# Patient Record
Sex: FEMALE | Race: WHITE | NOT HISPANIC OR LATINO | Employment: UNEMPLOYED | URBAN - METROPOLITAN AREA
[De-identification: names, ages, dates, MRNs, and addresses within clinical notes are randomized per-mention and may not be internally consistent; named-entity substitution may affect disease eponyms.]

---

## 2018-12-28 ENCOUNTER — APPOINTMENT (EMERGENCY)
Dept: RADIOLOGY | Facility: HOSPITAL | Age: 38
End: 2018-12-28
Payer: COMMERCIAL

## 2018-12-28 ENCOUNTER — HOSPITAL ENCOUNTER (EMERGENCY)
Facility: HOSPITAL | Age: 38
Discharge: HOME | End: 2018-12-28
Attending: EMERGENCY MEDICINE
Payer: COMMERCIAL

## 2018-12-28 VITALS
TEMPERATURE: 99.4 F | SYSTOLIC BLOOD PRESSURE: 98 MMHG | DIASTOLIC BLOOD PRESSURE: 55 MMHG | WEIGHT: 144 LBS | HEART RATE: 64 BPM | BODY MASS INDEX: 25.51 KG/M2 | OXYGEN SATURATION: 97 % | RESPIRATION RATE: 18 BRPM

## 2018-12-28 DIAGNOSIS — R50.9 FEVER: ICD-10-CM

## 2018-12-28 DIAGNOSIS — R51.9 HEADACHE: Primary | ICD-10-CM

## 2018-12-28 LAB
ALBUMIN SERPL BCP-MCNC: 2.9 G/DL (ref 3.5–5)
ALP SERPL-CCNC: 99 U/L (ref 46–116)
ALT SERPL W P-5'-P-CCNC: 27 U/L (ref 12–78)
ANION GAP SERPL CALCULATED.3IONS-SCNC: 7 MMOL/L (ref 4–13)
AST SERPL W P-5'-P-CCNC: 18 U/L (ref 5–45)
BACTERIA UR QL AUTO: ABNORMAL /HPF
BASOPHILS # BLD AUTO: 0.03 THOUSANDS/ΜL (ref 0–0.1)
BASOPHILS NFR BLD AUTO: 0 % (ref 0–1)
BILIRUB DIRECT SERPL-MCNC: 0.1 MG/DL (ref 0–0.2)
BILIRUB SERPL-MCNC: 0.2 MG/DL (ref 0.2–1)
BILIRUB UR QL STRIP: NEGATIVE
BUN SERPL-MCNC: 11 MG/DL (ref 5–25)
CALCIUM SERPL-MCNC: 8.2 MG/DL (ref 8.3–10.1)
CAOX CRY URNS QL MICRO: ABNORMAL /HPF
CHLORIDE SERPL-SCNC: 105 MMOL/L (ref 100–108)
CLARITY UR: ABNORMAL
CO2 SERPL-SCNC: 25 MMOL/L (ref 21–32)
COLOR UR: YELLOW
CREAT SERPL-MCNC: 0.83 MG/DL (ref 0.6–1.3)
EOSINOPHIL # BLD AUTO: 0.17 THOUSAND/ΜL (ref 0–0.61)
EOSINOPHIL NFR BLD AUTO: 2 % (ref 0–6)
ERYTHROCYTE [DISTWIDTH] IN BLOOD BY AUTOMATED COUNT: 14 % (ref 11.6–15.1)
EXT PREG TEST URINE: NEGATIVE
FLUAV AG SPEC QL IA: NEGATIVE
FLUBV AG SPEC QL IA: NEGATIVE
GFR SERPL CREATININE-BSD FRML MDRD: 89 ML/MIN/1.73SQ M
GLUCOSE SERPL-MCNC: 102 MG/DL (ref 65–140)
GLUCOSE UR STRIP-MCNC: NEGATIVE MG/DL
HCT VFR BLD AUTO: 31.8 % (ref 34.8–46.1)
HGB BLD-MCNC: 9.9 G/DL (ref 11.5–15.4)
HGB UR QL STRIP.AUTO: ABNORMAL
IMM GRANULOCYTES # BLD AUTO: 0.02 THOUSAND/UL (ref 0–0.2)
IMM GRANULOCYTES NFR BLD AUTO: 0 % (ref 0–2)
KETONES UR STRIP-MCNC: NEGATIVE MG/DL
LACTATE SERPL-SCNC: 0.8 MMOL/L (ref 0.5–2)
LEUKOCYTE ESTERASE UR QL STRIP: ABNORMAL
LYMPHOCYTES # BLD AUTO: 1.64 THOUSANDS/ΜL (ref 0.6–4.47)
LYMPHOCYTES NFR BLD AUTO: 22 % (ref 14–44)
MAGNESIUM SERPL-MCNC: 2 MG/DL (ref 1.6–2.6)
MCH RBC QN AUTO: 30.7 PG (ref 26.8–34.3)
MCHC RBC AUTO-ENTMCNC: 31.1 G/DL (ref 31.4–37.4)
MCV RBC AUTO: 99 FL (ref 82–98)
MONOCYTES # BLD AUTO: 0.81 THOUSAND/ΜL (ref 0.17–1.22)
MONOCYTES NFR BLD AUTO: 11 % (ref 4–12)
MUCOUS THREADS UR QL AUTO: ABNORMAL
NEUTROPHILS # BLD AUTO: 4.96 THOUSANDS/ΜL (ref 1.85–7.62)
NEUTS SEG NFR BLD AUTO: 65 % (ref 43–75)
NITRITE UR QL STRIP: NEGATIVE
NON-SQ EPI CELLS URNS QL MICRO: ABNORMAL /HPF
NRBC BLD AUTO-RTO: 0 /100 WBCS
PH UR STRIP.AUTO: 6.5 [PH] (ref 5–9)
PLATELET # BLD AUTO: 249 THOUSANDS/UL (ref 149–390)
PMV BLD AUTO: 9.1 FL (ref 8.9–12.7)
POTASSIUM SERPL-SCNC: 3.3 MMOL/L (ref 3.5–5.3)
PROT SERPL-MCNC: 6.2 G/DL (ref 6.4–8.2)
PROT UR STRIP-MCNC: NEGATIVE MG/DL
RBC # BLD AUTO: 3.23 MILLION/UL (ref 3.81–5.12)
RBC #/AREA URNS AUTO: ABNORMAL /HPF
S PYO AG THROAT QL: NEGATIVE
SODIUM SERPL-SCNC: 137 MMOL/L (ref 136–145)
SP GR UR STRIP.AUTO: <=1.005 (ref 1–1.03)
UROBILINOGEN UR QL STRIP.AUTO: 1 E.U./DL
WBC # BLD AUTO: 7.63 THOUSAND/UL (ref 4.31–10.16)
WBC #/AREA URNS AUTO: ABNORMAL /HPF

## 2018-12-28 PROCEDURE — 99283 EMERGENCY DEPT VISIT LOW MDM: CPT

## 2018-12-28 PROCEDURE — 36415 COLL VENOUS BLD VENIPUNCTURE: CPT | Performed by: EMERGENCY MEDICINE

## 2018-12-28 PROCEDURE — 81001 URINALYSIS AUTO W/SCOPE: CPT | Performed by: EMERGENCY MEDICINE

## 2018-12-28 PROCEDURE — 87086 URINE CULTURE/COLONY COUNT: CPT | Performed by: EMERGENCY MEDICINE

## 2018-12-28 PROCEDURE — 96365 THER/PROPH/DIAG IV INF INIT: CPT

## 2018-12-28 PROCEDURE — 87186 SC STD MICRODIL/AGAR DIL: CPT | Performed by: EMERGENCY MEDICINE

## 2018-12-28 PROCEDURE — 83735 ASSAY OF MAGNESIUM: CPT | Performed by: EMERGENCY MEDICINE

## 2018-12-28 PROCEDURE — 96375 TX/PRO/DX INJ NEW DRUG ADDON: CPT

## 2018-12-28 PROCEDURE — 80048 BASIC METABOLIC PNL TOTAL CA: CPT | Performed by: EMERGENCY MEDICINE

## 2018-12-28 PROCEDURE — 87430 STREP A AG IA: CPT | Performed by: EMERGENCY MEDICINE

## 2018-12-28 PROCEDURE — 71046 X-RAY EXAM CHEST 2 VIEWS: CPT

## 2018-12-28 PROCEDURE — 96361 HYDRATE IV INFUSION ADD-ON: CPT

## 2018-12-28 PROCEDURE — 87077 CULTURE AEROBIC IDENTIFY: CPT | Performed by: EMERGENCY MEDICINE

## 2018-12-28 PROCEDURE — 85025 COMPLETE CBC W/AUTO DIFF WBC: CPT | Performed by: EMERGENCY MEDICINE

## 2018-12-28 PROCEDURE — 83605 ASSAY OF LACTIC ACID: CPT | Performed by: EMERGENCY MEDICINE

## 2018-12-28 PROCEDURE — 81025 URINE PREGNANCY TEST: CPT | Performed by: EMERGENCY MEDICINE

## 2018-12-28 PROCEDURE — 87631 RESP VIRUS 3-5 TARGETS: CPT | Performed by: EMERGENCY MEDICINE

## 2018-12-28 PROCEDURE — 80076 HEPATIC FUNCTION PANEL: CPT | Performed by: EMERGENCY MEDICINE

## 2018-12-28 RX ORDER — ACETAMINOPHEN 325 MG/1
650 TABLET ORAL ONCE
Status: DISCONTINUED | OUTPATIENT
Start: 2018-12-28 | End: 2018-12-28

## 2018-12-28 RX ORDER — DESVENLAFAXINE 100 MG/1
100 TABLET, EXTENDED RELEASE ORAL DAILY
COMMUNITY
End: 2020-02-26

## 2018-12-28 RX ORDER — GABAPENTIN 800 MG/1
1200 TABLET ORAL 3 TIMES DAILY
COMMUNITY

## 2018-12-28 RX ORDER — BUSPIRONE HYDROCHLORIDE 30 MG/1
30 TABLET ORAL 3 TIMES DAILY
COMMUNITY
End: 2020-02-26

## 2018-12-28 RX ORDER — BUTALBITAL, ACETAMINOPHEN AND CAFFEINE 50; 325; 40 MG/1; MG/1; MG/1
1 TABLET ORAL EVERY 4 HOURS PRN
Status: DISCONTINUED | OUTPATIENT
Start: 2018-12-28 | End: 2018-12-28 | Stop reason: HOSPADM

## 2018-12-28 RX ORDER — METOCLOPRAMIDE 10 MG/1
10 TABLET ORAL EVERY 6 HOURS
Qty: 10 TABLET | Refills: 0 | Status: SHIPPED | OUTPATIENT
Start: 2018-12-28 | End: 2018-12-31

## 2018-12-28 RX ORDER — CEFTRIAXONE 1 G/50ML
1000 INJECTION, SOLUTION INTRAVENOUS ONCE
Status: COMPLETED | OUTPATIENT
Start: 2018-12-28 | End: 2018-12-28

## 2018-12-28 RX ORDER — CEPHALEXIN 500 MG/1
500 CAPSULE ORAL EVERY 8 HOURS SCHEDULED
Qty: 21 CAPSULE | Refills: 0 | Status: SHIPPED | OUTPATIENT
Start: 2018-12-28 | End: 2019-01-04

## 2018-12-28 RX ORDER — OXYCODONE HYDROCHLORIDE AND ACETAMINOPHEN 5; 325 MG/1; MG/1
1 TABLET ORAL EVERY 8 HOURS PRN
COMMUNITY
End: 2020-02-26

## 2018-12-28 RX ORDER — PRAZOSIN HYDROCHLORIDE 1 MG/1
1 CAPSULE ORAL 3 TIMES DAILY
COMMUNITY
End: 2020-02-26

## 2018-12-28 RX ORDER — MORPHINE SULFATE 15 MG/1
15 TABLET, FILM COATED, EXTENDED RELEASE ORAL 2 TIMES DAILY
COMMUNITY
End: 2020-02-26

## 2018-12-28 RX ORDER — KETOROLAC TROMETHAMINE 10 MG/1
10 TABLET, FILM COATED ORAL EVERY 6 HOURS PRN
Qty: 12 TABLET | Refills: 0 | Status: SHIPPED | OUTPATIENT
Start: 2018-12-28 | End: 2020-02-26

## 2018-12-28 RX ORDER — MELOXICAM 15 MG/1
1 TABLET ORAL DAILY
COMMUNITY
End: 2020-02-26

## 2018-12-28 RX ORDER — DIPHENHYDRAMINE HYDROCHLORIDE 50 MG/ML
25 INJECTION INTRAMUSCULAR; INTRAVENOUS ONCE
Status: COMPLETED | OUTPATIENT
Start: 2018-12-28 | End: 2018-12-28

## 2018-12-28 RX ORDER — AMITRIPTYLINE HYDROCHLORIDE 10 MG/1
1 TABLET, FILM COATED ORAL
COMMUNITY
Start: 2013-03-26 | End: 2020-02-26

## 2018-12-28 RX ORDER — KETOROLAC TROMETHAMINE 30 MG/ML
15 INJECTION, SOLUTION INTRAMUSCULAR; INTRAVENOUS ONCE
Status: COMPLETED | OUTPATIENT
Start: 2018-12-28 | End: 2018-12-28

## 2018-12-28 RX ORDER — TOPIRAMATE 50 MG/1
75 TABLET, FILM COATED ORAL 2 TIMES DAILY
COMMUNITY
End: 2020-02-26

## 2018-12-28 RX ORDER — PROPRANOLOL HYDROCHLORIDE 40 MG/1
40 TABLET ORAL 2 TIMES DAILY
COMMUNITY
End: 2020-02-26

## 2018-12-28 RX ORDER — METOCLOPRAMIDE HYDROCHLORIDE 5 MG/ML
10 INJECTION INTRAMUSCULAR; INTRAVENOUS ONCE
Status: COMPLETED | OUTPATIENT
Start: 2018-12-28 | End: 2018-12-28

## 2018-12-28 RX ADMIN — METOCLOPRAMIDE 10 MG: 5 INJECTION, SOLUTION INTRAMUSCULAR; INTRAVENOUS at 18:40

## 2018-12-28 RX ADMIN — KETOROLAC TROMETHAMINE 15 MG: 30 INJECTION, SOLUTION INTRAMUSCULAR at 18:40

## 2018-12-28 RX ADMIN — DIPHENHYDRAMINE HYDROCHLORIDE 25 MG: 50 INJECTION, SOLUTION INTRAMUSCULAR; INTRAVENOUS at 18:40

## 2018-12-28 RX ADMIN — SODIUM CHLORIDE 1000 ML: 0.9 INJECTION, SOLUTION INTRAVENOUS at 18:40

## 2018-12-28 RX ADMIN — CEFTRIAXONE 1000 MG: 1 INJECTION, SOLUTION INTRAVENOUS at 19:59

## 2018-12-28 RX ADMIN — BUTALBITAL, ACETAMINOPHEN AND CAFFEINE 1 TABLET: 50; 325; 40 TABLET ORAL at 19:11

## 2018-12-28 NOTE — ED PROVIDER NOTES
History  Chief Complaint   Patient presents with    Fever - 9 weeks to 74 years     States she started this morning with chills and fever of 103  States dizzy  Tylenol at 2pm, no Motrin   denies cough, denies sore throat     69-year-old female presents with fever head pressure nasal congestion for the past day  She says the fever came on suddenly today  Denies any rashes  Patient denies any cough congestion dysuria urgency frequency abdominal pain dyspareunia vaginal discharge back pain or any other injuries or complaints  Patient denies any head trauma  She does have a history of migraines and says she is on Topamax  Not on anticoagulation  History provided by:  Patient   used: No        Prior to Admission Medications   Prescriptions Last Dose Informant Patient Reported? Taking?    Lurasidone HCl (LATUDA PO) 12/27/2018 at Unknown time Self Yes Yes   Sig: Take by mouth daily   amitriptyline (ELAVIL) 10 mg tablet 12/27/2018 at Unknown time  Yes Yes   Sig: Take 1 tablet by mouth daily at bedtime   busPIRone (BUSPAR) 30 MG tablet 12/28/2018 at 1200 Self Yes Yes   Sig: Take 30 mg by mouth 3 (three) times a day   desvenlafaxine (PRISTIQ) 100 mg 24 hr tablet 12/27/2018 at Unknown time Self Yes Yes   Sig: Take 100 mg by mouth daily   gabapentin (NEURONTIN) 800 mg tablet 12/28/2018 at 1200 Self Yes Yes   Sig: Take 1,600 mg by mouth 3 (three) times a day   lisdexamfetamine (VYVANSE) 60 MG capsule 12/28/2018 at 0800 Self Yes Yes   Sig: Take 60 mg by mouth every morning   meloxicam (MOBIC) 15 mg tablet 12/28/2018 at Unknown time  Yes Yes   Sig: Take 1 tablet by mouth daily   morphine (MS CONTIN) 15 mg 12 hr tablet 12/28/2018 at 0800 Self Yes Yes   Sig: Take 15 mg by mouth 2 (two) times a day   oxyCODONE-acetaminophen (PERCOCET) 5-325 mg per tablet 12/28/2018 at 1200 Self Yes Yes   Sig: Take 1 tablet by mouth every 8 (eight) hours as needed for moderate pain   prazosin (MINIPRESS) 1 mg capsule 12/28/2018 at 1200 Self Yes Yes   Sig: Take 1 mg by mouth 3 (three) times a day   propranolol (INDERAL) 40 mg tablet 12/28/2018 at 0800 Self Yes Yes   Sig: Take 40 mg by mouth 2 (two) times a day   topiramate (TOPAMAX) 50 MG tablet 12/28/2018 at 1200 Self Yes Yes   Sig: Take 75 mg by mouth 2 (two) times a day      Facility-Administered Medications: None       Past Medical History:   Diagnosis Date    Anemia     Chronic pain     Diabetes mellitus ([address] )     no meds       Past Surgical History:   Procedure Laterality Date    BACK SURGERY      JORGE-EN-Y PROCEDURE      2012       History reviewed  No pertinent family history  I have reviewed and agree with the history as documented  Social History   Substance Use Topics    Smoking status: Former Smoker    Smokeless tobacco: Never Used    Alcohol use Yes      Comment: occasionally        Review of Systems   All other systems reviewed and are negative  Physical Exam  Physical Exam   Constitutional: She is oriented to person, place, and time  She appears well-developed and well-nourished  HENT:   Head: Normocephalic and atraumatic  Eyes: Pupils are equal, round, and reactive to light  EOM are normal    Neck: Normal range of motion  Neck supple  No JVD present  No tracheal deviation present  No thyromegaly present  Neck was very supple with full range of motion flexion and extension  Negative Brudzinski's and negative Kernig sign  Cardiovascular: Normal rate and regular rhythm  Pulmonary/Chest: Effort normal and breath sounds normal  No stridor  No respiratory distress  She has no wheezes  She has no rales  She exhibits no tenderness  Abdominal: Soft  Bowel sounds are normal  She exhibits no distension and no mass  There is no tenderness  There is no rebound and no guarding  No hernia  Musculoskeletal: Normal range of motion  Lymphadenopathy:     She has no cervical adenopathy     Neurological: She is alert and oriented to person, place, and time    Skin: Skin is warm and dry  Psychiatric: She has a normal mood and affect  Nursing note and vitals reviewed  Vital Signs  ED Triage Vitals [12/28/18 1739]   Temperature Pulse Respirations Blood Pressure SpO2   (!) 101 4 °F (38 6 °C) 80 20 107/53 99 %      Temp Source Heart Rate Source Patient Position - Orthostatic VS BP Location FiO2 (%)   Tympanic Monitor Sitting Left arm --      Pain Score       7           Vitals:    12/28/18 1739 12/28/18 1950   BP: 107/53 98/55   Pulse: 80 64   Patient Position - Orthostatic VS: Sitting Lying       Visual Acuity      ED Medications  Medications   butalbital-acetaminophen-caffeine (FIORICET,ESGIC) -40 mg per tablet 1 tablet (1 tablet Oral Given 12/28/18 1911)   sodium chloride 0 9 % bolus 1,000 mL (0 mL Intravenous Stopped 12/28/18 1950)   ketorolac (TORADOL) injection 15 mg (15 mg Intravenous Given 12/28/18 1840)   diphenhydrAMINE (BENADRYL) injection 25 mg (25 mg Intravenous Given 12/28/18 1840)   metoclopramide (REGLAN) injection 10 mg (10 mg Intravenous Given 12/28/18 1840)   cefTRIAXone (ROCEPHIN) IVPB (premix) 1,000 mg (0 mg Intravenous Stopped 12/28/18 2029)       Diagnostic Studies  Results Reviewed     Procedure Component Value Units Date/Time    Lactic acid, plasma [[id number]]  (Normal) Collected:  12/28/18 1839    Lab Status:  Final result Specimen:  Blood from Arm, Left Updated:  12/28/18 1916     LACTIC ACID 0 8 mmol/L     Narrative:         Result may be elevated if tourniquet was used during collection  Rapid Influenza Screen with Reflex PCR [[id number]]  (Normal) Collected:  12/28/18 1839    Lab Status:  Final result Specimen:  Nasopharyngeal from Nasopharyngeal Swab Updated:  12/28/18 1911     Rapid Influenza A Ag Negative     Rapid Influenza B Ag Negative    INFLUENZA A/B AND RSV, PCR [[id number]] Collected:  12/28/18 1839    Lab Status:   In process Specimen:  Nasopharyngeal from Nasopharyngeal Swab Updated:  12/28/18 1911    Hepatic function panel [[id number]]  (Abnormal) Collected:  12/28/18 1839    Lab Status:  Final result Specimen:  Blood from Arm, Left Updated:  12/28/18 1911     Total Bilirubin 0 20 mg/dL      Bilirubin, Direct 0 10 mg/dL      Alkaline Phosphatase 99 U/L      AST 18 U/L      ALT 27 U/L      Total Protein 6 2 (L) g/dL      Albumin 2 9 (L) g/dL     Basic metabolic panel [[id number]]  (Abnormal) Collected:  12/28/18 1839    Lab Status:  Final result Specimen:  Blood from Arm, Left Updated:  12/28/18 1911     Sodium 137 mmol/L      Potassium 3 3 (L) mmol/L      Chloride 105 mmol/L      CO2 25 mmol/L      ANION GAP 7 mmol/L      BUN 11 mg/dL      Creatinine 0 83 mg/dL      Glucose 102 mg/dL      Calcium 8 2 (L) mg/dL      eGFR 89 ml/min/1 73sq m     Narrative:         National Kidney Disease Education Program recommendations are as follows:  GFR calculation is accurate only with a steady state creatinine  Chronic Kidney disease less than 60 ml/min/1 73 sq  meters  Kidney failure less than 15 ml/min/1 73 sq  meters      Magnesium [[id number]]  (Normal) Collected:  12/28/18 1839    Lab Status:  Final result Specimen:  Blood from Arm, Left Updated:  12/28/18 1911     Magnesium 2 0 mg/dL     Urine Microscopic [[id number]]  (Abnormal) Collected:  12/28/18 1843    Lab Status:  Final result Specimen:  Urine from Urine, Clean Catch Updated:  12/28/18 1906     RBC, UA 0-1 (A) /hpf      WBC, UA 20-30 (A) /hpf      Epithelial Cells Moderate (A) /hpf      Bacteria, UA Moderate (A) /hpf      Ca Oxalate Clarice, UA Occasional (A) /hpf      MUCUS THREADS Occasional (A)    Rapid Strep A Screen Only, Adults [[id number]]  (Normal) Collected:  12/28/18 1839    Lab Status:  Final result Specimen:  Throat from Throat Updated:  12/28/18 1902     Rapid Strep A Screen Negative    UA w Reflex to Microscopic [[id number]]  (Abnormal) Collected:  12/28/18 1843    Lab Status:  Final result Specimen:  Urine from Urine, Clean Catch Updated:  12/28/18 1858     Color, UA Yellow     Clarity, UA Slightly Cloudy     Specific Gravity, UA <=1 005     pH, UA 6 5     Leukocytes, UA Large (A)     Nitrite, UA Negative     Protein, UA Negative mg/dl      Glucose, UA Negative mg/dl      Ketones, UA Negative mg/dl      Urobilinogen, UA 1 0 E U /dl      Bilirubin, UA Negative     Blood, UA Trace-Intact (A)    CBC and differential [[id number]]  (Abnormal) Collected:  12/28/18 1839    Lab Status:  Final result Specimen:  Blood from Arm, Left Updated:  12/28/18 1855     WBC 7 63 Thousand/uL      RBC 3 23 (L) Million/uL      Hemoglobin 9 9 (L) g/dL      Hematocrit 31 8 (L) %      MCV 99 (H) fL      MCH 30 7 pg      MCHC 31 1 (L) g/dL      RDW 14 0 %      MPV 9 1 fL      Platelets 871 Thousands/uL      nRBC 0 /100 WBCs      Neutrophils Relative 65 %      Immat GRANS % 0 %      Lymphocytes Relative 22 %      Monocytes Relative 11 %      Eosinophils Relative 2 %      Basophils Relative 0 %      Neutrophils Absolute 4 96 Thousands/µL      Immature Grans Absolute 0 02 Thousand/uL      Lymphocytes Absolute 1 64 Thousands/µL      Monocytes Absolute 0 81 Thousand/µL      Eosinophils Absolute 0 17 Thousand/µL      Basophils Absolute 0 03 Thousands/µL     Urine culture [[id number]] Collected:  12/28/18 1843    Lab Status: In process Specimen:  Urine from Urine, Clean Catch Updated:  12/28/18 1854    POCT pregnancy, urine [[id number]]  (Normal) Resulted:  12/28/18 1853    Lab Status:  Final result Updated:  12/28/18 1853     EXT PREG TEST UR (Ref: Negative) negative                 XR chest 2 views   Final Result by Kourtney Sommers MD (12/28 1946)      No acute cardiopulmonary disease  Workstation performed: [id number]                    Procedures  Procedures       Phone Contacts  ED Phone Contact    ED Course                               MDM  Number of Diagnoses or Management Options  Fever:   Headache:   Diagnosis management comments: Patient evaluated with labs chest x-ray urinalysis    I gave her IV antibiotics in the ED  Patient given IV Toradol Reglan Benadryl and p  O  Fioricet with IV fluids in the ED  His symptoms improved  She was discharged with follow up with her family doctor  She had stable vital signs and well-appearing at the time of discharge  Amount and/or Complexity of Data Reviewed  Clinical lab tests: ordered and reviewed  Tests in the radiology section of CPT®: reviewed and ordered  Tests in the medicine section of CPT®: ordered and reviewed    Patient Progress  Patient progress: stable    CritCare Time    Disposition  Final diagnoses:   Headache   Fever     Time reflects when diagnosis was documented in both MDM as applicable and the Disposition within this note     Time User Action Codes Description Comment    12/28/2018  8:08 PM Kathryn Kinney Add [R51] Headache     12/28/2018  8:08 PM Kamini Kinney Add [R50 9] Fever       ED Disposition     ED Disposition Condition Comment    Discharge  [facility] discharge to home/self care      Condition at discharge: Stable        Follow-up Information     Follow up With Specialties Details Why Contact Info Additional Information    [facility] Emergency Department Emergency Medicine  If symptoms worsen [address] [facility] [address] [facility], [place], [place], [address]          Discharge Medication List as of 12/28/2018  8:10 PM      START taking these medications    Details   cephalexin (KEFLEX) 500 mg capsule Take 1 capsule (500 mg total) by mouth every 8 (eight) hours for 7 days, Starting Fri 12/28/2018, Until Fri 1/4/2019, Print      ketorolac (TORADOL) 10 mg tablet Take 1 tablet (10 mg total) by mouth every 6 (six) hours as needed for moderate pain for up to 3 days, Starting Fri 12/28/2018, Until Mon 12/31/2018, Print      metoclopramide (REGLAN) 10 mg tablet Take 1 tablet (10 mg total) by mouth every 6 (six) hours for 3 days, Starting Fri 12/28/2018, Until Mon 12/31/2018, Print         CONTINUE these medications which have NOT CHANGED    Details   amitriptyline (ELAVIL) 10 mg tablet Take 1 tablet by mouth daily at bedtime, Starting Tue 3/26/2013, Historical Med      busPIRone (BUSPAR) 30 MG tablet Take 30 mg by mouth 3 (three) times a day, Historical Med      desvenlafaxine (PRISTIQ) 100 mg 24 hr tablet Take 100 mg by mouth daily, Historical Med      gabapentin (NEURONTIN) 800 mg tablet Take 1,600 mg by mouth 3 (three) times a day, Historical Med      lisdexamfetamine (VYVANSE) 60 MG capsule Take 60 mg by mouth every morning, Historical Med      Lurasidone HCl (LATUDA PO) Take by mouth daily, Historical Med      meloxicam (MOBIC) 15 mg tablet Take 1 tablet by mouth daily, Historical Med      morphine (MS CONTIN) 15 mg 12 hr tablet Take 15 mg by mouth 2 (two) times a day, Historical Med      oxyCODONE-acetaminophen (PERCOCET) 5-325 mg per tablet Take 1 tablet by mouth every 8 (eight) hours as needed for moderate pain, Historical Med      prazosin (MINIPRESS) 1 mg capsule Take 1 mg by mouth 3 (three) times a day, Historical Med      propranolol (INDERAL) 40 mg tablet Take 40 mg by mouth 2 (two) times a day, Historical Med      topiramate (TOPAMAX) 50 MG tablet Take 75 mg by mouth 2 (two) times a day, Historical Med           No discharge procedures on file      ED Provider  Electronically Signed by           Jim Chairez DO  12/28/18 2047

## 2018-12-29 LAB
FLUAV AG SPEC QL: NORMAL
FLUBV AG SPEC QL: NORMAL
RSV B RNA SPEC QL NAA+PROBE: NORMAL

## 2018-12-29 NOTE — DISCHARGE INSTRUCTIONS
Acute Headache   WHAT YOU NEED TO KNOW:   An acute headache is pain or discomfort that starts suddenly and gets worse quickly  You may have an acute headache only when you feel stress or eat certain foods  Other acute headache pain can happen every day, and sometimes several times a day  DISCHARGE INSTRUCTIONS:   Return to the emergency department if:   · You have severe pain  · You have numbness or weakness on one side of your face or body  · You have a headache that occurs after a blow to the head, a fall, or other trauma  · You have a headache, are forgetful or confused, or have trouble speaking  · You have a headache, stiff neck, and a fever  Contact your healthcare provider if:   · You have a constant headache and are vomiting  · You have a headache each day that does not get better, even after treatment  · You have changes in your headaches, or new symptoms that occur when you have a headache  · You have questions or concerns about your condition or care  Medicines: You may need any of the following:  · Prescription pain medicine  may be given  The medicine your healthcare provider recommends will depend on the kind of headaches you have  You will need to take prescription headache medicines as directed to prevent a problem called rebound headache  These headaches happen with regular use of pain relievers for headache disorders  · NSAIDs , such as ibuprofen, help decrease swelling, pain, and fever  This medicine is available with or without a doctor's order  NSAIDs can cause stomach bleeding or kidney problems in certain people  If you take blood thinner medicine, always ask your healthcare provider if NSAIDs are safe for you  Always read the medicine label and follow directions  · Acetaminophen  decreases pain and fever  It is available without a doctor's order  Ask how much to take and how often to take it  Follow directions   Read the labels of all other medicines you are using to see if they also contain acetaminophen, or ask your doctor or pharmacist  Acetaminophen can cause liver damage if not taken correctly  Do not use more than 3 grams (3,000 milligrams) total of acetaminophen in one day  · Antidepressants  may be given for some kinds of headaches  · Take your medicine as directed  Contact your healthcare provider if you think your medicine is not helping or if you have side effects  Tell him or her if you are allergic to any medicine  Keep a list of the medicines, vitamins, and herbs you take  Include the amounts, and when and why you take them  Bring the list or the pill bottles to follow-up visits  Carry your medicine list with you in case of an emergency  Manage your symptoms:   · Apply heat or ice  on the headache area  Use a heat or ice pack  For an ice pack, you can also put crushed ice in a plastic bag  Cover the pack or bag with a towel before you apply it to your skin  Ice and heat both help decrease pain, and heat also helps decrease muscle spasms  Apply heat for 20 to 30 minutes every 2 hours  Apply ice for 15 to 20 minutes every hour  Apply heat or ice for as long and for as many days as directed  You may alternate heat and ice  · Relax your muscles  Lie down in a comfortable position and close your eyes  Relax your muscles slowly  Start at your toes and work your way up your body  · Keep a record of your headaches  Write down when your headaches start and stop  Include your symptoms and what you were doing when the headache began  Record what you ate or drank for 24 hours before the headache started  Describe the pain and where it hurts  Keep track of what you did to treat your headache and if it worked  Prevent an acute headache:   · Avoid anything that triggers an acute headache  Examples include exposure to chemicals, going to high altitude, or not getting enough sleep  Create a regular sleep routine   Go to sleep at the same time and wake up at the same time each day  Do not use electronic devices before bedtime  These may trigger a headache or prevent you from sleeping well  · Do not smoke  Nicotine and other chemicals in cigarettes and cigars can trigger an acute headache or make it worse  Ask your healthcare provider for information if you currently smoke and need help to quit  E-cigarettes or smokeless tobacco still contain nicotine  Talk to your healthcare provider before you use these products  · Limit alcohol as directed  Alcohol can trigger an acute headache or make it worse  If you have cluster headaches, do not drink alcohol during an episode  For other types of headaches, ask your healthcare provider if it is safe for you to drink alcohol  Ask how much is safe for you to drink, and how often  · Exercise as directed  Exercise can reduce tension and help with headache pain  Aim for 30 minutes of physical activity on most days of the week  Your healthcare provider can help you create an exercise plan  · Eat a variety of healthy foods  Healthy foods include fruits, vegetables, low-fat dairy products, lean meats, fish, whole grains, and cooked beans  Your healthcare provider or dietitian can help you create meals plans if you need to avoid foods that trigger headaches  Follow up with your healthcare provider as directed:  Bring your headache record with you when you see your healthcare provider  Write down your questions so you remember to ask them during your visits  © 2017 [facility] Information is for End User's use only and may not be sold, redistributed or otherwise used for commercial purposes  All illustrations and images included in CareNotes® are the copyrighted property of [employer]  or Amilcar Gandhi  The above information is an  only  It is not intended as medical advice for individual conditions or treatments   Talk to your doctor, nurse or pharmacist before following any medical regimen to see if it is safe and effective for you  Fever in Adults   WHAT YOU NEED TO KNOW:   A fever is an increase in your body temperature  Normal body temperature is 98 6°F (37°C)  Fever is generally defined as greater than 100 4°F (38°C)  Common causes include an infection, injury, or disease such as arthritis  DISCHARGE INSTRUCTIONS:   Return to the emergency department if:   · Your fever does not go away or gets worse even after treatment  · You have a stiff neck and a bad headache  · You are confused  You may not be able to think clearly or remember things like you normally do  · Your heart beats faster than usual even after treatment  · You have shortness of breath or chest pain when you breathe  · You urinate small amounts or not at all  · Your skin, lips, or nails turn blue  Contact your healthcare provider if:   · You have abdominal pain or you feel bloated  · You have nausea or are vomiting  · You have pain or burning when you urinate, or you have pain in your back  · You have questions or concerns about your condition or care  Medicines: You may need any of the following:  · NSAIDs , such as ibuprofen, help decrease swelling, pain, and fever  This medicine is available with or without a doctor's order  NSAIDs can cause stomach bleeding or kidney problems in certain people  If you take blood thinner medicine, always ask if NSAIDs are safe for you  Always read the medicine label and follow directions  Do not give these medicines to children under 10months of age without direction from your child's healthcare provider  · Acetaminophen  decreases pain and fever  It is available without a doctor's order  Ask how much to take and how often to take it  Follow directions   Read the labels of all other medicines you are using to see if they also contain acetaminophen, or ask your doctor or pharmacist  Acetaminophen can cause liver damage if not taken correctly  Do not use more than 4 grams (4,000 milligrams) total of acetaminophen in one day  · Antibiotics  may be given if you have an infection caused by bacteria  · Take your medicine as directed  Contact your healthcare provider if you think your medicine is not helping or if you have side effects  Tell him of her if you are allergic to any medicine  Keep a list of the medicines, vitamins, and herbs you take  Include the amounts, and when and why you take them  Bring the list or the pill bottles to follow-up visits  Carry your medicine list with you in case of an emergency  Follow up with your healthcare provider as directed:  Write down your questions so you remember to ask them during your visits  Self-care:   · Drink more liquids as directed  A fever makes you sweat  This can increase your risk for dehydration  Liquids can help prevent dehydration  ¨ Drink at least 6 to 8 eight-ounce cups of clear liquids each day  Drink water, juice, or broth  Do not drink sports drinks  They may contain caffeine  ¨ Ask your healthcare provider if you should drink an oral rehydration solution (ORS)  An ORS has the right amounts of water, salts, and sugar you need to replace body fluids  · Dress in lightweight clothes  Shivers may be a sign that your fever is rising  Do not put extra blankets or clothes on  This may cause your fever to rise even higher  Dress in light, comfortable clothing  Use a lightweight blanket or sheet when you sleep  Change your clothes, blanket, or sheets if they get wet  · Cool yourself safely  Take a bath in cool or lukewarm water  Use an ice pack wrapped in a small towel or wet a washcloth with cool water  Place the ice pack or wet washcloth on your forehead or the back of your neck  © 2017 [address] [employer] Information is for End User's use only and may not be sold, redistributed or otherwise used for commercial purposes   All illustrations and images included in [facility] are the copyrighted property of [employer]  or Amilcar Gandhi  The above information is an  only  It is not intended as medical advice for individual conditions or treatments  Talk to your doctor, nurse or pharmacist before following any medical regimen to see if it is safe and effective for you  Urinary Tract Infection in Women   WHAT YOU NEED TO KNOW:   A urinary tract infection (UTI) is caused by bacteria that get inside your urinary tract  Most bacteria that enter your urinary tract come out when you urinate  If the bacteria stay in your urinary tract, you may get an infection  Your urinary tract includes your kidneys, ureters, bladder, and urethra  Urine is made in your kidneys, and it flows from the ureters to the bladder  Urine leaves the bladder through the urethra  A UTI is more common in your lower urinary tract, which includes your bladder and urethra  DISCHARGE INSTRUCTIONS:   Return to the emergency department if:   · You are urinating very little or not at all  · You have a high fever with shaking chills  · You have side or back pain that gets worse  Contact your healthcare provider if:   · You have a fever  · You do not feel better after 2 days of taking antibiotics  · You are vomiting  · You have questions or concerns about your condition or care  Medicines:   · Antibiotics  help fight a bacterial infection  · Medicines  may be given to decrease pain and burning when you urinate  They will also help decrease the feeling that you need to urinate often  These medicines will make your urine orange or red  · Take your medicine as directed  Contact your healthcare provider if you think your medicine is not helping or if you have side effects  Tell him or her if you are allergic to any medicine  Keep a list of the medicines, vitamins, and herbs you take  Include the amounts, and when and why you take them   Bring the list or the pill bottles to follow-up visits  Carry your medicine list with you in case of an emergency  Follow up with your healthcare provider as directed:  Write down your questions so you remember to ask them during your visits  Prevent another UTI:   · Empty your bladder often  Urinate and empty your bladder as soon as you feel the need  Do not hold your urine for long periods of time  · Wipe from front to back after you urinate or have a bowel movement  This will help prevent germs from getting into your urinary tract through your urethra  · Drink liquids as directed  Ask how much liquid to drink each day and which liquids are best for you  You may need to drink more liquids than usual to help flush out the bacteria  Do not drink alcohol, caffeine, or citrus juices  These can irritate your bladder and increase your symptoms  Your healthcare provider may recommend cranberry juice to help prevent a UTI  · Urinate after you have sex  This can help flush out bacteria passed during sex  · Do not douche or use feminine deodorants  These can change the chemical balance in your vagina  · Change sanitary pads or tampons often  This will help prevent germs from getting into your urinary tract  · Do pelvic muscle exercises often  Pelvic muscle exercises may help you start and stop urinating  Strong pelvic muscles may help you empty your bladder easier  Squeeze these muscles tightly for 5 seconds like you are trying to hold back urine  Then relax for 5 seconds  Gradually work up to squeezing for 10 seconds  Do 3 sets of 15 repetitions a day, or as directed  © 2017 [address] [employer] Information is for End User's use only and may not be sold, redistributed or otherwise used for commercial purposes  All illustrations and images included in CareNotes® are the copyrighted property of [employer]  or Amilcar Gandhi  The above information is an  only   It is not intended as medical advice for individual conditions or treatments  Talk to your doctor, nurse or pharmacist before following any medical regimen to see if it is safe and effective for you

## 2018-12-30 LAB
BACTERIA UR CULT: ABNORMAL
BACTERIA UR CULT: ABNORMAL

## 2019-02-04 ENCOUNTER — APPOINTMENT (EMERGENCY)
Dept: RADIOLOGY | Facility: HOSPITAL | Age: 39
End: 2019-02-04
Payer: COMMERCIAL

## 2019-02-04 ENCOUNTER — HOSPITAL ENCOUNTER (OUTPATIENT)
Facility: HOSPITAL | Age: 39
Setting detail: OBSERVATION
Discharge: LEFT BEFORE DISCHARGE | End: 2019-02-04
Attending: EMERGENCY MEDICINE | Admitting: FAMILY MEDICINE
Payer: COMMERCIAL

## 2019-02-04 VITALS
TEMPERATURE: 99.3 F | SYSTOLIC BLOOD PRESSURE: 108 MMHG | BODY MASS INDEX: 27.21 KG/M2 | RESPIRATION RATE: 16 BRPM | HEART RATE: 82 BPM | DIASTOLIC BLOOD PRESSURE: 51 MMHG | HEIGHT: 61 IN | OXYGEN SATURATION: 97 %

## 2019-02-04 DIAGNOSIS — N12 PYELONEPHRITIS: Primary | ICD-10-CM

## 2019-02-04 PROBLEM — F41.9 ANXIETY AND DEPRESSION: Status: ACTIVE | Noted: 2019-02-04

## 2019-02-04 PROBLEM — G43.909 MIGRAINE HEADACHE: Status: ACTIVE | Noted: 2019-02-04

## 2019-02-04 PROBLEM — F32.A ANXIETY AND DEPRESSION: Status: ACTIVE | Noted: 2019-02-04

## 2019-02-04 PROBLEM — M54.9 CHRONIC BACK PAIN: Status: ACTIVE | Noted: 2019-02-04

## 2019-02-04 PROBLEM — G89.29 CHRONIC PAIN: Status: ACTIVE | Noted: 2019-02-04

## 2019-02-04 PROBLEM — E11.9 TYPE 2 DIABETES MELLITUS WITHOUT COMPLICATION, WITHOUT LONG-TERM CURRENT USE OF INSULIN (HCC): Status: ACTIVE | Noted: 2019-02-04

## 2019-02-04 LAB
ALBUMIN SERPL BCP-MCNC: 3.2 G/DL (ref 3.5–5)
ALP SERPL-CCNC: 92 U/L (ref 46–116)
ALT SERPL W P-5'-P-CCNC: 24 U/L (ref 12–78)
ANION GAP SERPL CALCULATED.3IONS-SCNC: 8 MMOL/L (ref 4–13)
APTT PPP: 28 SECONDS (ref 26–38)
AST SERPL W P-5'-P-CCNC: 25 U/L (ref 5–45)
B-HCG SERPL-ACNC: <2 MIU/ML
BACTERIA UR QL AUTO: ABNORMAL /HPF
BASOPHILS # BLD AUTO: 0.03 THOUSANDS/ΜL (ref 0–0.1)
BASOPHILS NFR BLD AUTO: 0 % (ref 0–1)
BILIRUB DIRECT SERPL-MCNC: 0.1 MG/DL (ref 0–0.2)
BILIRUB SERPL-MCNC: 0.3 MG/DL (ref 0.2–1)
BILIRUB UR QL STRIP: NEGATIVE
BUN SERPL-MCNC: 11 MG/DL (ref 5–25)
CALCIUM SERPL-MCNC: 8.4 MG/DL (ref 8.3–10.1)
CHLORIDE SERPL-SCNC: 104 MMOL/L (ref 100–108)
CLARITY UR: ABNORMAL
CO2 SERPL-SCNC: 29 MMOL/L (ref 21–32)
COLOR UR: YELLOW
CREAT SERPL-MCNC: 0.68 MG/DL (ref 0.6–1.3)
EOSINOPHIL # BLD AUTO: 0.04 THOUSAND/ΜL (ref 0–0.61)
EOSINOPHIL NFR BLD AUTO: 0 % (ref 0–6)
ERYTHROCYTE [DISTWIDTH] IN BLOOD BY AUTOMATED COUNT: 12.7 % (ref 11.6–15.1)
EXT PREG TEST URINE: NEGATIVE
GFR SERPL CREATININE-BSD FRML MDRD: 110 ML/MIN/1.73SQ M
GLUCOSE SERPL-MCNC: 114 MG/DL (ref 65–140)
GLUCOSE UR STRIP-MCNC: NEGATIVE MG/DL
HCT VFR BLD AUTO: 38.2 % (ref 34.8–46.1)
HGB BLD-MCNC: 11.9 G/DL (ref 11.5–15.4)
HGB UR QL STRIP.AUTO: NEGATIVE
IMM GRANULOCYTES # BLD AUTO: 0.08 THOUSAND/UL (ref 0–0.2)
IMM GRANULOCYTES NFR BLD AUTO: 1 % (ref 0–2)
INR PPP: 0.98 (ref 0.86–1.16)
KETONES UR STRIP-MCNC: NEGATIVE MG/DL
LEUKOCYTE ESTERASE UR QL STRIP: ABNORMAL
LIPASE SERPL-CCNC: 56 U/L (ref 73–393)
LYMPHOCYTES # BLD AUTO: 0.56 THOUSANDS/ΜL (ref 0.6–4.47)
LYMPHOCYTES NFR BLD AUTO: 4 % (ref 14–44)
MAGNESIUM SERPL-MCNC: 1.6 MG/DL (ref 1.6–2.6)
MCH RBC QN AUTO: 30.2 PG (ref 26.8–34.3)
MCHC RBC AUTO-ENTMCNC: 31.2 G/DL (ref 31.4–37.4)
MCV RBC AUTO: 97 FL (ref 82–98)
MONOCYTES # BLD AUTO: 0.6 THOUSAND/ΜL (ref 0.17–1.22)
MONOCYTES NFR BLD AUTO: 4 % (ref 4–12)
NEUTROPHILS # BLD AUTO: 13.11 THOUSANDS/ΜL (ref 1.85–7.62)
NEUTS SEG NFR BLD AUTO: 91 % (ref 43–75)
NITRITE UR QL STRIP: POSITIVE
NON-SQ EPI CELLS URNS QL MICRO: ABNORMAL /HPF
NRBC BLD AUTO-RTO: 0 /100 WBCS
PH UR STRIP.AUTO: 7 [PH] (ref 5–9)
PLATELET # BLD AUTO: 415 THOUSANDS/UL (ref 149–390)
PMV BLD AUTO: 8.4 FL (ref 8.9–12.7)
POTASSIUM SERPL-SCNC: 3.5 MMOL/L (ref 3.5–5.3)
PROT SERPL-MCNC: 6.6 G/DL (ref 6.4–8.2)
PROT UR STRIP-MCNC: NEGATIVE MG/DL
PROTHROMBIN TIME: 10.3 SECONDS (ref 9.4–11.7)
RBC # BLD AUTO: 3.94 MILLION/UL (ref 3.81–5.12)
RBC #/AREA URNS AUTO: ABNORMAL /HPF
SODIUM SERPL-SCNC: 141 MMOL/L (ref 136–145)
SP GR UR STRIP.AUTO: 1.01 (ref 1–1.03)
UROBILINOGEN UR QL STRIP.AUTO: 0.2 E.U./DL
WBC # BLD AUTO: 14.42 THOUSAND/UL (ref 4.31–10.16)
WBC #/AREA URNS AUTO: ABNORMAL /HPF

## 2019-02-04 PROCEDURE — 81025 URINE PREGNANCY TEST: CPT | Performed by: EMERGENCY MEDICINE

## 2019-02-04 PROCEDURE — 76830 TRANSVAGINAL US NON-OB: CPT

## 2019-02-04 PROCEDURE — 96375 TX/PRO/DX INJ NEW DRUG ADDON: CPT

## 2019-02-04 PROCEDURE — 76856 US EXAM PELVIC COMPLETE: CPT

## 2019-02-04 PROCEDURE — 96365 THER/PROPH/DIAG IV INF INIT: CPT

## 2019-02-04 PROCEDURE — 87491 CHLMYD TRACH DNA AMP PROBE: CPT | Performed by: EMERGENCY MEDICINE

## 2019-02-04 PROCEDURE — 83735 ASSAY OF MAGNESIUM: CPT | Performed by: EMERGENCY MEDICINE

## 2019-02-04 PROCEDURE — 99220 PR INITIAL OBSERVATION CARE/DAY 70 MINUTES: CPT | Performed by: FAMILY MEDICINE

## 2019-02-04 PROCEDURE — 74177 CT ABD & PELVIS W/CONTRAST: CPT

## 2019-02-04 PROCEDURE — 36415 COLL VENOUS BLD VENIPUNCTURE: CPT | Performed by: EMERGENCY MEDICINE

## 2019-02-04 PROCEDURE — 85025 COMPLETE CBC W/AUTO DIFF WBC: CPT | Performed by: EMERGENCY MEDICINE

## 2019-02-04 PROCEDURE — 85730 THROMBOPLASTIN TIME PARTIAL: CPT | Performed by: EMERGENCY MEDICINE

## 2019-02-04 PROCEDURE — 81001 URINALYSIS AUTO W/SCOPE: CPT | Performed by: EMERGENCY MEDICINE

## 2019-02-04 PROCEDURE — 99285 EMERGENCY DEPT VISIT HI MDM: CPT

## 2019-02-04 PROCEDURE — 84702 CHORIONIC GONADOTROPIN TEST: CPT | Performed by: EMERGENCY MEDICINE

## 2019-02-04 PROCEDURE — 96361 HYDRATE IV INFUSION ADD-ON: CPT

## 2019-02-04 PROCEDURE — 80076 HEPATIC FUNCTION PANEL: CPT | Performed by: EMERGENCY MEDICINE

## 2019-02-04 PROCEDURE — 87591 N.GONORRHOEAE DNA AMP PROB: CPT | Performed by: EMERGENCY MEDICINE

## 2019-02-04 PROCEDURE — 80048 BASIC METABOLIC PNL TOTAL CA: CPT | Performed by: EMERGENCY MEDICINE

## 2019-02-04 PROCEDURE — 83690 ASSAY OF LIPASE: CPT | Performed by: EMERGENCY MEDICINE

## 2019-02-04 PROCEDURE — 85610 PROTHROMBIN TIME: CPT | Performed by: EMERGENCY MEDICINE

## 2019-02-04 RX ORDER — ONDANSETRON 2 MG/ML
INJECTION INTRAMUSCULAR; INTRAVENOUS
Status: COMPLETED
Start: 2019-02-04 | End: 2019-02-04

## 2019-02-04 RX ORDER — AMITRIPTYLINE HYDROCHLORIDE 10 MG/1
10 TABLET, FILM COATED ORAL
Status: DISCONTINUED | OUTPATIENT
Start: 2019-02-04 | End: 2019-02-04 | Stop reason: HOSPADM

## 2019-02-04 RX ORDER — ONDANSETRON 2 MG/ML
4 INJECTION INTRAMUSCULAR; INTRAVENOUS EVERY 6 HOURS PRN
Status: DISCONTINUED | OUTPATIENT
Start: 2019-02-04 | End: 2019-02-04 | Stop reason: HOSPADM

## 2019-02-04 RX ORDER — CEFTRIAXONE 1 G/50ML
1000 INJECTION, SOLUTION INTRAVENOUS EVERY 24 HOURS
Status: DISCONTINUED | OUTPATIENT
Start: 2019-02-05 | End: 2019-02-04 | Stop reason: HOSPADM

## 2019-02-04 RX ORDER — MORPHINE SULFATE 15 MG/1
15 TABLET, FILM COATED, EXTENDED RELEASE ORAL 2 TIMES DAILY
Status: DISCONTINUED | OUTPATIENT
Start: 2019-02-04 | End: 2019-02-04 | Stop reason: HOSPADM

## 2019-02-04 RX ORDER — ONDANSETRON 4 MG/1
4 TABLET, FILM COATED ORAL EVERY 6 HOURS
Qty: 9 TABLET | Refills: 0 | Status: SHIPPED | OUTPATIENT
Start: 2019-02-04 | End: 2020-02-26

## 2019-02-04 RX ORDER — DESVENLAFAXINE 50 MG/1
100 TABLET, EXTENDED RELEASE ORAL DAILY
Status: DISCONTINUED | OUTPATIENT
Start: 2019-02-05 | End: 2019-02-04 | Stop reason: HOSPADM

## 2019-02-04 RX ORDER — TOPIRAMATE 25 MG/1
75 TABLET ORAL 2 TIMES DAILY
Status: DISCONTINUED | OUTPATIENT
Start: 2019-02-04 | End: 2019-02-04 | Stop reason: HOSPADM

## 2019-02-04 RX ORDER — PROPRANOLOL HYDROCHLORIDE 20 MG/1
40 TABLET ORAL 2 TIMES DAILY
Status: DISCONTINUED | OUTPATIENT
Start: 2019-02-04 | End: 2019-02-04 | Stop reason: HOSPADM

## 2019-02-04 RX ORDER — SODIUM CHLORIDE 9 MG/ML
100 INJECTION, SOLUTION INTRAVENOUS CONTINUOUS
Status: DISCONTINUED | OUTPATIENT
Start: 2019-02-04 | End: 2019-02-04 | Stop reason: HOSPADM

## 2019-02-04 RX ORDER — BUSPIRONE HYDROCHLORIDE 10 MG/1
30 TABLET ORAL 3 TIMES DAILY
Status: DISCONTINUED | OUTPATIENT
Start: 2019-02-04 | End: 2019-02-04 | Stop reason: HOSPADM

## 2019-02-04 RX ORDER — CEPHALEXIN 500 MG/1
500 CAPSULE ORAL EVERY 6 HOURS SCHEDULED
Qty: 40 CAPSULE | Refills: 0 | Status: SHIPPED | OUTPATIENT
Start: 2019-02-04 | End: 2019-02-14

## 2019-02-04 RX ORDER — GABAPENTIN 400 MG/1
1200 CAPSULE ORAL 3 TIMES DAILY
Status: DISCONTINUED | OUTPATIENT
Start: 2019-02-04 | End: 2019-02-04 | Stop reason: HOSPADM

## 2019-02-04 RX ORDER — KETOROLAC TROMETHAMINE 30 MG/ML
30 INJECTION, SOLUTION INTRAMUSCULAR; INTRAVENOUS EVERY 6 HOURS PRN
Status: DISCONTINUED | OUTPATIENT
Start: 2019-02-04 | End: 2019-02-04 | Stop reason: HOSPADM

## 2019-02-04 RX ORDER — ACETAMINOPHEN 325 MG/1
650 TABLET ORAL EVERY 6 HOURS PRN
Status: DISCONTINUED | OUTPATIENT
Start: 2019-02-04 | End: 2019-02-04 | Stop reason: HOSPADM

## 2019-02-04 RX ORDER — CEFTRIAXONE 1 G/50ML
1000 INJECTION, SOLUTION INTRAVENOUS ONCE
Status: COMPLETED | OUTPATIENT
Start: 2019-02-04 | End: 2019-02-04

## 2019-02-04 RX ORDER — PRAZOSIN HYDROCHLORIDE 1 MG/1
1 CAPSULE ORAL 3 TIMES DAILY
Status: DISCONTINUED | OUTPATIENT
Start: 2019-02-04 | End: 2019-02-04 | Stop reason: HOSPADM

## 2019-02-04 RX ORDER — MORPHINE SULFATE 4 MG/ML
4 INJECTION, SOLUTION INTRAMUSCULAR; INTRAVENOUS ONCE
Status: COMPLETED | OUTPATIENT
Start: 2019-02-04 | End: 2019-02-04

## 2019-02-04 RX ORDER — KETOROLAC TROMETHAMINE 30 MG/ML
15 INJECTION, SOLUTION INTRAMUSCULAR; INTRAVENOUS ONCE
Status: COMPLETED | OUTPATIENT
Start: 2019-02-04 | End: 2019-02-04

## 2019-02-04 RX ORDER — ONDANSETRON 2 MG/ML
4 INJECTION INTRAMUSCULAR; INTRAVENOUS ONCE
Status: COMPLETED | OUTPATIENT
Start: 2019-02-04 | End: 2019-02-04

## 2019-02-04 RX ADMIN — CEFTRIAXONE 1000 MG: 1 INJECTION, SOLUTION INTRAVENOUS at 13:31

## 2019-02-04 RX ADMIN — ONDANSETRON 4 MG: 2 INJECTION INTRAMUSCULAR; INTRAVENOUS at 12:52

## 2019-02-04 RX ADMIN — SODIUM CHLORIDE 1000 ML: 0.9 INJECTION, SOLUTION INTRAVENOUS at 11:51

## 2019-02-04 RX ADMIN — KETOROLAC TROMETHAMINE 30 MG: 30 INJECTION, SOLUTION INTRAMUSCULAR at 18:40

## 2019-02-04 RX ADMIN — KETOROLAC TROMETHAMINE 15 MG: 30 INJECTION, SOLUTION INTRAMUSCULAR at 11:50

## 2019-02-04 RX ADMIN — IOHEXOL 100 ML: 350 INJECTION, SOLUTION INTRAVENOUS at 13:19

## 2019-02-04 RX ADMIN — MORPHINE SULFATE 4 MG: 4 INJECTION INTRAVENOUS at 15:09

## 2019-02-04 NOTE — DISCHARGE INSTRUCTIONS
Kidney Infection   WHAT YOU NEED TO KNOW:   A kidney infection, or pyelonephritis, is a bacterial infection  The infection usually starts in your bladder or urethra and moves into your kidney  One or both kidneys may be infected  DISCHARGE INSTRUCTIONS:   Return to the emergency department if:   · You have a fever and chills  · You cannot stop vomiting  · You have severe pain in your abdomen, lower back, or sides  Contact your healthcare provider if:   · You continue to have a fever after you take antibiotics for 3 days  · You have pain when you urinate, even after treatment  · Your signs and symptoms return  · You have questions or concerns about your condition or care  Medicines: You may  need any of the following:  · Antibiotics  treat your bacterial infection  · Acetaminophen  decreases pain and fever  It is available without a doctor's order  Ask how much to take and how often to take it  Follow directions  Read the labels of all other medicines you are using to see if they also contain acetaminophen, or ask your doctor or pharmacist  Acetaminophen can cause liver damage if not taken correctly  Do not use more than 4 grams (4,000 milligrams) total of acetaminophen in one day  · NSAIDs , such as ibuprofen, help decrease swelling, pain, and fever  This medicine is available with or without a doctor's order  NSAIDs can cause stomach bleeding or kidney problems in certain people  If you take blood thinner medicine, always ask if NSAIDs are safe for you  Always read the medicine label and follow directions  Do not give these medicines to children under 10months of age without direction from your child's healthcare provider  · Prescription pain medicine  may be given  Ask how to take this medicine safely  · Take your medicine as directed  Contact your healthcare provider if you think your medicine is not helping or if you have side effects   Tell him of her if you are allergic to any medicine  Keep a list of the medicines, vitamins, and herbs you take  Include the amounts, and when and why you take them  Bring the list or the pill bottles to follow-up visits  Carry your medicine list with you in case of an emergency  Drink liquids as directed: You may need to drink extra liquids to help flush your kidneys and urinary system  Water is the best liquid to drink  Ask your healthcare provider how much liquid to drink each day and which liquids are best for you  Urinate as soon as you feel the urge: This will help flush bacteria from your urinary system  Do not wait or hold your urine for too long  Clean your genital area every day with soap and water:  Wipe from front to back after you urinate or have a bowel movement  Wear cotton underwear  Fabrics such as nylon and polyester can stay damp  This can increase your risk for infection  Urinate within 15 minutes after you have sex  Follow up with your healthcare provider as directed:  Write down your questions so you remember to ask them during your visits  © 2017 [facility] Information is for End User's use only and may not be sold, redistributed or otherwise used for commercial purposes  All illustrations and images included in CareNotes® are the copyrighted property of [employer]  or Amilcar Gandhi  The above information is an  only  It is not intended as medical advice for individual conditions or treatments  Talk to your doctor, nurse or pharmacist before following any medical regimen to see if it is safe and effective for you

## 2019-02-04 NOTE — ED PROVIDER NOTES
History  Chief Complaint   Patient presents with    Abdominal Pain     pt presents the ed with bilateral lower abdominal pain x 1 day  36 y/o female presents with suprapubic pain acute onset 10/10 that started this morning, associated with nausea, denies vomiting,fevers,chills or other symptoms  Denies dysuria, urgency or frequency  denies vaginal discharge or abnormal vaginal bleeding, or dyspareunia  History of UTI in the past  The pain is sharp continuous non radiating and nothing makes it better or worse  History provided by:  Patient   used: No        Prior to Admission Medications   Prescriptions Last Dose Informant Patient Reported? Taking?    Lurasidone HCl (LATUDA PO)  Self Yes No   Sig: Take by mouth daily   amitriptyline (ELAVIL) 10 mg tablet   Yes No   Sig: Take 1 tablet by mouth daily at bedtime   busPIRone (BUSPAR) 30 MG tablet  Self Yes No   Sig: Take 30 mg by mouth 3 (three) times a day   desvenlafaxine (PRISTIQ) 100 mg 24 hr tablet  Self Yes No   Sig: Take 100 mg by mouth daily   gabapentin (NEURONTIN) 800 mg tablet  Self Yes No   Sig: Take 1,600 mg by mouth 3 (three) times a day   ketorolac (TORADOL) 10 mg tablet   No No   Sig: Take 1 tablet (10 mg total) by mouth every 6 (six) hours as needed for moderate pain for up to 3 days   lisdexamfetamine (VYVANSE) 60 MG capsule  Self Yes No   Sig: Take 60 mg by mouth every morning   meloxicam (MOBIC) 15 mg tablet   Yes No   Sig: Take 1 tablet by mouth daily   morphine (MS CONTIN) 15 mg 12 hr tablet  Self Yes No   Sig: Take 15 mg by mouth 2 (two) times a day   oxyCODONE-acetaminophen (PERCOCET) 5-325 mg per tablet  Self Yes No   Sig: Take 1 tablet by mouth every 8 (eight) hours as needed for moderate pain   prazosin (MINIPRESS) 1 mg capsule  Self Yes No   Sig: Take 1 mg by mouth 3 (three) times a day   propranolol (INDERAL) 40 mg tablet  Self Yes No   Sig: Take 40 mg by mouth 2 (two) times a day   topiramate (TOPAMAX) 50 MG tablet  Self Yes No   Sig: Take 75 mg by mouth 2 (two) times a day      Facility-Administered Medications: None       Past Medical History:   Diagnosis Date    Anemia     Chronic pain     Diabetes mellitus ([address] )     no meds       Past Surgical History:   Procedure Laterality Date    BACK SURGERY      JORGE-EN-Y PROCEDURE      2012       History reviewed  No pertinent family history  I have reviewed and agree with the history as documented  Social History   Substance Use Topics    Smoking status: Former Smoker    Smokeless tobacco: Never Used    Alcohol use Yes      Comment: occasionally        Review of Systems   All other systems reviewed and are negative  Physical Exam  Physical Exam   Constitutional: She is oriented to person, place, and time  She appears well-developed and well-nourished  HENT:   Head: Normocephalic and atraumatic  Eyes: Pupils are equal, round, and reactive to light  EOM are normal    Neck: Normal range of motion  Neck supple  Cardiovascular: Normal rate and regular rhythm  Pulmonary/Chest: Effort normal and breath sounds normal    Abdominal: Soft  Bowel sounds are normal  She exhibits no distension and no mass  There is tenderness  There is no rebound and no guarding  No hernia  Musculoskeletal: Normal range of motion  Neurological: She is alert and oriented to person, place, and time  Skin: Skin is warm and dry  Psychiatric: She has a normal mood and affect  Nursing note and vitals reviewed        Vital Signs  ED Triage Vitals   Temperature Pulse Respirations Blood Pressure SpO2   02/04/19 1123 02/04/19 1123 02/04/19 1123 02/04/19 1123 02/04/19 1123   98 3 °F (36 8 °C) 80 20 112/60 100 %      Temp Source Heart Rate Source Patient Position - Orthostatic VS BP Location FiO2 (%)   02/04/19 1332 02/04/19 1123 02/04/19 1332 02/04/19 1332 --   Temporal Monitor Lying Left arm       Pain Score       02/04/19 1332       No Pain           Vitals:    02/04/19 1123 02/04/19 1332   BP: 112/60 111/54   Pulse: 80 82   Patient Position - Orthostatic VS:  Lying       Visual Acuity      ED Medications  Medications   sodium chloride 0 9 % bolus 1,000 mL (0 mL Intravenous Stopped 2/4/19 1330)   ketorolac (TORADOL) injection 15 mg (15 mg Intravenous Given 2/4/19 1150)   ondansetron (ZOFRAN) injection 4 mg (4 mg Intravenous Given 2/4/19 1252)   cefTRIAXone (ROCEPHIN) IVPB (premix) 1,000 mg (0 mg Intravenous Stopped 2/4/19 1458)   iohexol (OMNIPAQUE) 350 MG/ML injection (MULTI-DOSE) 100 mL (100 mL Intravenous Given 2/4/19 1319)   morphine (PF) 4 mg/mL injection 4 mg (4 mg Intravenous Given 2/4/19 1509)       Diagnostic Studies  Results Reviewed     Procedure Component Value Units Date/Time    Urine culture [[id number]]     Lab Status:  No result Specimen:  Urine     Chlamydia/GC amplified DNA by PCR [[id number]] Collected:  02/04/19 1512    Lab Status: In process Specimen:  Urine, Other Updated:  02/04/19 1515    CBC and differential [[id number]]  (Abnormal) Collected:  02/04/19 1152    Lab Status:  Final result Specimen:  Blood from Arm, Left Updated:  02/04/19 1236     WBC 14 42 (H) Thousand/uL      RBC 3 94 Million/uL      Hemoglobin 11 9 g/dL      Hematocrit 38 2 %      MCV 97 fL      MCH 30 2 pg      MCHC 31 2 (L) g/dL      RDW 12 7 %      MPV 8 4 (L) fL      Platelets 517 (H) Thousands/uL      nRBC 0 /100 WBCs      Neutrophils Relative 91 (H) %      Immat GRANS % 1 %      Lymphocytes Relative 4 (L) %      Monocytes Relative 4 %      Eosinophils Relative 0 %      Basophils Relative 0 %      Neutrophils Absolute 13 11 (H) Thousands/µL      Immature Grans Absolute 0 08 Thousand/uL      Lymphocytes Absolute 0 56 (L) Thousands/µL      Monocytes Absolute 0 60 Thousand/µL      Eosinophils Absolute 0 04 Thousand/µL      Basophils Absolute 0 03 Thousands/µL     Narrative:        plts inc on smear review  This is an appended report    These results have been appended to a previously verified report      hCG, quantitative [[id number]]  (Normal) Collected:  02/04/19 1152    Lab Status:  Final result Specimen:  Blood from Arm, Left Updated:  02/04/19 1231     HCG, Quant <2 mIU/mL     Narrative:          Expected Ranges:     Approximate               Approximate HCG  Gestation age          Concentration ( mIU/mL)  _____________          ______________________   Verne Spatz                      HCG values  0 2-1                       5-50  1-2                           2-3                         100-5000  3-4                         500-19581  4-5                         1000-25302  5-6                         [id number]  6-8                         54433-496532  8-12                        [id number]    Urine Microscopic [[id number]]  (Abnormal) Collected:  02/04/19 1212    Lab Status:  Final result Specimen:  Urine from Urine, Clean Catch Updated:  02/04/19 1225     RBC, UA 4-10 (A) /hpf      WBC, UA 0-5 /hpf      Epithelial Cells Occasional /hpf      Bacteria, UA Innumerable (A) /hpf     Narrative:       < 10cc submitted    Protime-INR [[id number]]  (Normal) Collected:  02/04/19 1152    Lab Status:  Final result Specimen:  Blood from Arm, Left Updated:  02/04/19 1225     Protime 10 3 seconds      INR 0 98    APTT [[id number]]  (Normal) Collected:  02/04/19 1152    Lab Status:  Final result Specimen:  Blood from Arm, Left Updated:  02/04/19 1225     PTT 28 seconds     Basic metabolic panel [[id number]] Collected:  02/04/19 1152    Lab Status:  Final result Specimen:  Blood from Arm, Left Updated:  02/04/19 1224     Sodium 141 mmol/L      Potassium 3 5 mmol/L      Chloride 104 mmol/L      CO2 29 mmol/L      ANION GAP 8 mmol/L      BUN 11 mg/dL      Creatinine 0 68 mg/dL      Glucose 114 mg/dL      Calcium 8 4 mg/dL      eGFR 110 ml/min/1 73sq m     Narrative:         National Kidney Disease Education Program recommendations are as follows:  GFR calculation is accurate only with a steady state creatinine  Chronic Kidney disease less than 60 ml/min/1 73 sq  meters  Kidney failure less than 15 ml/min/1 73 sq  meters  Hepatic function panel [[id number]]  (Abnormal) Collected:  02/04/19 1152    Lab Status:  Final result Specimen:  Blood from Arm, Left Updated:  02/04/19 1224     Total Bilirubin 0 30 mg/dL      Bilirubin, Direct 0 10 mg/dL      Alkaline Phosphatase 92 U/L      AST 25 U/L      ALT 24 U/L      Total Protein 6 6 g/dL      Albumin 3 2 (L) g/dL     Lipase [[id number]]  (Abnormal) Collected:  02/04/19 1152    Lab Status:  Final result Specimen:  Blood from Arm, Left Updated:  02/04/19 1224     Lipase 56 (L) u/L     Magnesium [[id number]]  (Normal) Collected:  02/04/19 1152    Lab Status:  Final result Specimen:  Blood from Arm, Left Updated:  02/04/19 1224     Magnesium 1 6 mg/dL     UA w Reflex to Microscopic [[id number]]  (Abnormal) Collected:  02/04/19 1212    Lab Status:  Final result Specimen:  Urine from Urine, Clean Catch Updated:  02/04/19 1222     Color, UA Yellow     Clarity, UA Cloudy     Specific Gravity, UA 1 015     pH, UA 7 0     Leukocytes, UA Moderate (A)     Nitrite, UA Positive (A)     Protein, UA Negative mg/dl      Glucose, UA Negative mg/dl      Ketones, UA Negative mg/dl      Urobilinogen, UA 0 2 E U /dl      Bilirubin, UA Negative     Blood, UA Negative    POCT pregnancy, urine [[id number]]  (Normal) Resulted:  02/04/19 1213    Lab Status:  Final result Updated:  02/04/19 1213     EXT PREG TEST UR (Ref: Negative) negative                 CT abdomen pelvis with contrast   Final Result by Gabo Lyon MD (02/04 1425)      Cortical hypodensity in the kidneys, left greater than right with thick-walled bladder  Given urinalysis suggestive of infection, this is suspicious for cystitis with bilateral pyelonephritis  The study was marked in [facility] for immediate notification              Workstation performed: [id number]         US pelvis complete w transvaginal   Final Result by Fransisco Justice MD (02/04 8453)          1  Trace right adnexal fluid, likely physiologic  2   No ovarian torsion  Workstation performed: [id number]                    Procedures  Procedures       Phone Contacts  ED Phone Contact    ED Course                               MDM  Number of Diagnoses or Management Options  Pyelonephritis:   Diagnosis management comments: Patient evaluated with labs, imaging, UA  Reviewed results discussed with patient  Patient given IV fluids, pain, nausea medications,IV antibiotics  Patient improved with symptoms, admitted to hospitalist service for further evaluation and management  Patient verbalized understanding of results and agreed with the plan         Amount and/or Complexity of Data Reviewed  Clinical lab tests: ordered and reviewed  Tests in the radiology section of CPT®: ordered and reviewed  Tests in the medicine section of CPT®: ordered and reviewed    Patient Progress  Patient progress: stable      Disposition  Final diagnoses:   Pyelonephritis     Time reflects when diagnosis was documented in both MDM as applicable and the Disposition within this note     Time User Action Codes Description Comment    2/4/2019  3:42 PM Emily Kinney Add [N12] Pyelonephritis       ED Disposition     ED Disposition Condition Date/Time Comment    Admit  Mon Feb 4, 2019  4:00 PM        Follow-up Information     Follow up With Specialties Details Why Contact Info Additional Information    Reagan Lee MD Family Medicine Schedule an appointment as soon as possible for a visit  [address]   [address] Emergency Department Emergency Medicine  If symptoms worsen [address] [place] [address]  [phone] [facility], [place], [place], [address]          Current Discharge Medication List      START taking these medications    Details   cephalexin (KEFLEX) 500 mg capsule Take 1 capsule (500 mg total) by mouth every 6 (six) hours for 10 days  Qty: 40 capsule, Refills: 0    Associated Diagnoses: Pyelonephritis      ondansetron (ZOFRAN) 4 mg tablet Take 1 tablet (4 mg total) by mouth every 6 (six) hours for 3 days  Qty: 9 tablet, Refills: 0    Associated Diagnoses: Pyelonephritis         CONTINUE these medications which have NOT CHANGED    Details   amitriptyline (ELAVIL) 10 mg tablet Take 1 tablet by mouth daily at bedtime      busPIRone (BUSPAR) 30 MG tablet Take 30 mg by mouth 3 (three) times a day      desvenlafaxine (PRISTIQ) 100 mg 24 hr tablet Take 100 mg by mouth daily      gabapentin (NEURONTIN) 800 mg tablet Take 1,600 mg by mouth 3 (three) times a day      ketorolac (TORADOL) 10 mg tablet Take 1 tablet (10 mg total) by mouth every 6 (six) hours as needed for moderate pain for up to 3 days  Qty: 12 tablet, Refills: 0    Associated Diagnoses: Headache      lisdexamfetamine (VYVANSE) 60 MG capsule Take 60 mg by mouth every morning      Lurasidone HCl (LATUDA PO) Take by mouth daily      meloxicam (MOBIC) 15 mg tablet Take 1 tablet by mouth daily      morphine (MS CONTIN) 15 mg 12 hr tablet Take 15 mg by mouth 2 (two) times a day      oxyCODONE-acetaminophen (PERCOCET) 5-325 mg per tablet Take 1 tablet by mouth every 8 (eight) hours as needed for moderate pain      prazosin (MINIPRESS) 1 mg capsule Take 1 mg by mouth 3 (three) times a day      propranolol (INDERAL) 40 mg tablet Take 40 mg by mouth 2 (two) times a day      topiramate (TOPAMAX) 50 MG tablet Take 75 mg by mouth 2 (two) times a day           No discharge procedures on file      ED Provider  Electronically Signed by           Lilly Ramsey DO  02/04/19 8262

## 2019-02-05 LAB
C TRACH DNA SPEC QL NAA+PROBE: NEGATIVE
N GONORRHOEA DNA SPEC QL NAA+PROBE: NEGATIVE

## 2019-02-05 NOTE — PROGRESS NOTES
Patient cannot be found in the room to have her admission assessment done, collaborated with the  and described the patient to him  Made the nursing supervisor Rebeca of the [facility]  Made Dr Vadim Diaz aware , called her significant other Peola Friendly and told him what happened but cannot call her back since she has no service    checked the CCTV camera and found the patient , went to the [address] [place] [address] and took her gown off , and pulled her IV out , patient went to ER and asked to use the phone because she's being discharge   Spoke with her boyfriend who came back to the floor and told him to call her to encouraged her to come back but she needs to go to ER to be seen

## 2019-02-05 NOTE — DISCHARGE SUMMARY
Discharge Summary - [facility] 35 y o  female MRN: [id number]    Unit/Bed#: [address] [address] Encounter: [id number]    Admission Date:   Admission Orders     Ordered        02/04/19 1559  Place in Observation (expected length of stay for this patient is less than two midnights)  Once               Admitting Diagnosis: Abdominal pain [R10 9]  Pyelonephritis [N12]    HPI: (taken from Dr Marianela Villalba H&P Note dated 2/4/19):     [facility] is a 35 y o  female who presents with lower abdominal pain that started this morning associated with nausea  Patient states that the pain is constant in nature and was 10/10 in intensity but currently 7/10 in intensity  she denies any dysuria, urinary frequency but does report a whitish vaginal discharge  she does have chronic back pain and reports no worsening of this  Reports chills over the last few days and vomiting yesterday  last LMP was early January  She is sexually active with 1 partner    Procedures Performed: No orders of the defined types were placed in this encounter  Summary of Hospital Course: The pt underwent a CT which revealed bilateral pyelonephritis  She was started on IV Ceftriaxone and admitted to Medicine  However within several hours we were notified that the pt eloped  Discharge Diagnosis: Pyelonephritis     Resolved Problems  Date Reviewed: 2/4/2019    None          Condition at Discharge: left AMA; pt eloped          Discharge instructions/Information to patient and family:   See after visit summary for information provided to patient and family  Provisions for Follow-Up Care:  See after visit summary for information related to follow-up care and any pertinent home health orders  PCP: Merrell Kayser, MD    Disposition: Left against medical advice      Discharge Statement   I spent 20 minutes discharging the patient  This time was spent on the day of discharge  I had direct contact with the patient on the day of discharge  Additional documentation is required if more than 30 minutes were spent on discharge  Discharge Medications:  See after visit summary for reconciled discharge medications provided to patient and family

## 2019-02-05 NOTE — H&P
History and Physical - [facility] Internal Medicine    Patient Information: Mariah Kramer 35 y o  female MRN: [id number]  Unit/Bed#: [address] [address] Encounter: [id number]  Admitting Physician: Lisa Minor DO  PCP: Una Lyons MD  Date of Admission:  02/04/19        Hospital Problem List:     Principal Problem:    Pyelonephritis  Active Problems:    Type 2 diabetes mellitus without complication, without long-term current use of insulin ([facility])    Chronic back pain    Anxiety and depression    Migraine headache      Assessment/Plan:    * Pyelonephritis   Assessment & Plan    Bilateral pyelonephritis - left greater than right  Patient was given a dose of IV Rocephin in the ER which will be continued  Urine culture ordered  Patient reports improvement in her pain with Toradol which will be continued  Transvaginal ultrasound was negative ovarian torsion  F/U pending GC/Chlamydia  Repeat lab work in a m      Migraine headache   Assessment & Plan    Patient with history of migraine headaches  Denies any headache currently  Continue Inderal, Topamax     Anxiety and depression   Assessment & Plan    Continue Elavil, BuSpar, Latuda, Pristiq, vyvanse as she uses at home     Chronic back pain   Assessment & Plan    Continue gabapentin, morphine ER as she uses at home     Type 2 diabetes mellitus without complication, without long-term current use of insulin [facility])   Assessment & Plan     Patient not on any medications  Check hemoglobin A1c level in a m  VTE Prophylaxis: Enoxaparin (Lovenox)  / sequential compression device   Code Status: Level 1 - Full Code    Anticipated Length of Stay:  Patient will be admitted on an Observation basis with an anticipated length of stay of 1 midnights  Justification for Hospital Stay: B/L pyelonephritis    Total Time for Visit, including Counseling / Coordination of Care: 45 minutes    Greater than 50% of this total time spent on direct patient counseling and coordination of care  Chief Complaint:     Abdominal Pain (pt presents the ed with bilateral lower abdominal pain x 1 day  )    of Present Illness:    Jacob Sharma is a 35 y o  female who presents with lower abdominal pain that started this morning associated with nausea  Patient states that the pain is constant in nature and was 10/10 in intensity but currently 7/10 in intensity  she denies any dysuria, urinary frequency but does report a whitish vaginal discharge  she does have chronic back pain and reports no worsening of this  Reports chills over the last few days and vomiting yesterday  last LMP was early January  She is sexually active with 1 partner    Review of Systems:    Review of Systems   Constitutional: Positive for chills  Negative for appetite change and fever  HENT: Negative for congestion and sore throat  Eyes: Negative for photophobia and visual disturbance  Respiratory: Negative for cough, chest tightness and shortness of breath  Cardiovascular: Negative for chest pain and leg swelling  Gastrointestinal: Positive for abdominal pain, nausea and vomiting  Negative for blood in stool and diarrhea  Genitourinary: Positive for vaginal discharge  Negative for dysuria, frequency and hematuria  Musculoskeletal: Positive for back pain (chronic)  Skin: Negative for rash and wound  Neurological: Positive for light-headedness  Negative for syncope, speech difficulty and headaches  Hematological: Does not bruise/bleed easily  Psychiatric/Behavioral: Negative for agitation  Past Medical and Surgical History:     Past Medical History:   Diagnosis Date    Anemia     Chronic pain     Diabetes mellitus ([address] )     no meds       Past Surgical History:   Procedure Laterality Date    BACK SURGERY      JORGE-EN-Y PROCEDURE      2012       Meds/Allergies:    PTA meds:   Prior to Admission Medications   Prescriptions Last Dose Informant Patient Reported? Taking?    Lurasidone HCl (LATUDA PO) Self Yes No   Sig: Take by mouth daily   amitriptyline (ELAVIL) 10 mg tablet   Yes No   Sig: Take 1 tablet by mouth daily at bedtime   busPIRone (BUSPAR) 30 MG tablet  Self Yes No   Sig: Take 30 mg by mouth 3 (three) times a day   desvenlafaxine (PRISTIQ) 100 mg 24 hr tablet  Self Yes No   Sig: Take 100 mg by mouth daily   gabapentin (NEURONTIN) 800 mg tablet  Self Yes No   Sig: Take 1,600 mg by mouth 3 (three) times a day   ketorolac (TORADOL) 10 mg tablet   No No   Sig: Take 1 tablet (10 mg total) by mouth every 6 (six) hours as needed for moderate pain for up to 3 days   lisdexamfetamine (VYVANSE) 60 MG capsule  Self Yes No   Sig: Take 60 mg by mouth every morning   meloxicam (MOBIC) 15 mg tablet   Yes No   Sig: Take 1 tablet by mouth daily   morphine (MS CONTIN) 15 mg 12 hr tablet  Self Yes No   Sig: Take 15 mg by mouth 2 (two) times a day   oxyCODONE-acetaminophen (PERCOCET) 5-325 mg per tablet  Self Yes No   Sig: Take 1 tablet by mouth every 8 (eight) hours as needed for moderate pain   prazosin (MINIPRESS) 1 mg capsule  Self Yes No   Sig: Take 1 mg by mouth 3 (three) times a day   propranolol (INDERAL) 40 mg tablet  Self Yes No   Sig: Take 40 mg by mouth 2 (two) times a day   topiramate (TOPAMAX) 50 MG tablet  Self Yes No   Sig: Take 75 mg by mouth 2 (two) times a day      Facility-Administered Medications: None       Allergies: No Known Allergies  History:     Marital Status:      Substance Use History:   History   Alcohol Use    Yes     Comment: occasionally     History   Smoking Status    Former Smoker   Smokeless Tobacco    Never Used     History   Drug Use No       Family History:    History reviewed  No pertinent family history      Physical Exam:     Vitals:   Blood Pressure: 108/51 (02/04/19 1717)  Pulse: 82 (02/04/19 1717)  Temperature: 99 3 °F (37 4 °C) (02/04/19 1717)  Temp Source: Tympanic (02/04/19 1717)  Respirations: 16 (02/04/19 1717)  Height: 5' 1" (154 9 cm) (02/04/19 1717)  SpO2: 97 % (02/04/19 1717)    Physical Exam   Constitutional: She is oriented to person, place, and time  She appears well-developed and well-nourished  No distress  HENT:   Head: Normocephalic and atraumatic  Mouth/Throat: Oropharynx is clear and moist    Eyes: EOM are normal  Right eye exhibits no discharge  Left eye exhibits no discharge  No scleral icterus  Neck: Neck supple  No tracheal deviation present  Cardiovascular: Normal rate and regular rhythm  Pulmonary/Chest: Effort normal and breath sounds normal  No respiratory distress  She has no wheezes  She has no rales  Abdominal: Soft  Bowel sounds are normal  She exhibits no distension  There is tenderness (lower abdomen)  There is no guarding  Musculoskeletal: She exhibits no edema  Mild left sided CVA tenderness noted   Neurological: She is alert and oriented to person, place, and time  No cranial nerve deficit  Skin: Skin is dry  She is not diaphoretic  Psychiatric: She has a normal mood and affect  Lab Results: I have personally reviewed pertinent reports  Results from last 7 days  Lab Units 02/04/19  1152   WBC Thousand/uL 14 42*   HEMOGLOBIN g/dL 11 9   HEMATOCRIT % 38 2   PLATELETS Thousands/uL 415*   NEUTROS PCT % 91*   LYMPHS PCT % 4*   MONOS PCT % 4   EOS PCT % 0       Results from last 7 days  Lab Units 02/04/19  1152   POTASSIUM mmol/L 3 5   CHLORIDE mmol/L 104   CO2 mmol/L 29   BUN mg/dL 11   CREATININE mg/dL 0 68   CALCIUM mg/dL 8 4   ALK PHOS U/L 92   ALT U/L 24   AST U/L 25       Results from last 7 days  Lab Units 02/04/19  1152   INR  0 98       Imaging: I have personally reviewed pertinent reports  Us Pelvis Complete W Transvaginal    Result Date: 2/4/2019  Narrative: PELVIC ULTRASOUND, COMPLETE INDICATION:  35years old  lower abdominal pain,acute onset  COMPARISON: 1/6/2015 TECHNIQUE:   Transabdominal pelvic ultrasound was performed in sagittal and transverse planes with a curvilinear transducer  Additional transvaginal imaging was performed to better evaluate the endometrium and ovaries  Imaging included volumetric sweeps as well as traditional still imaging technique  FINDINGS: UTERUS: The uterus is anteverted in position, measuring 9 7 x 3 9 x 5 8 cm  Contour and echotexture appear normal  The cervix shows no suspicious abnormality  ENDOMETRIUM:  Normal caliber of 6 mm  Homogenous and normal in appearance  OVARIES/ADNEXA: Right ovary:  2 6 x 1 6 x 1 9 cm  No suspicious right ovarian abnormality  Trace fluid in the adnexa noted  Doppler flow within normal limits  Left ovary:  2 2 x 1 5 x 2 point cm  No suspicious left ovarian abnormality  Doppler flow within normal limits  No suspicious adnexal mass or loculated collections  There is no free fluid  Impression:  1  Trace right adnexal fluid, likely physiologic  2   No ovarian torsion  Workstation performed: [id number]     Ct Abdomen Pelvis With Contrast    Result Date: 2/4/2019  Narrative: CT ABDOMEN AND PELVIS WITH IV CONTRAST INDICATION:   Abdominal pain, unspecified  COMPARISON:  1/8/2014 TECHNIQUE:  CT examination of the abdomen and pelvis was performed  Axial, sagittal, and coronal 2D reformatted images were created from the source data and submitted for interpretation  Radiation dose length product (DLP) for this visit:  412 53 mGy-cm   This examination, like all CT scans performed in the [facility], was performed utilizing techniques to minimize radiation dose exposure, including the use of iterative  reconstruction and automated exposure control  IV Contrast:  100 mL of iohexol (OMNIPAQUE) Enteric Contrast:  Enteric contrast was not administered  FINDINGS: ABDOMEN Evaluation is limited by beam hardening artifact from extensive lumbar hardware  LOWER CHEST:  No clinically significant abnormality identified in the visualized lower chest  LIVER/BILIARY TREE:  Unremarkable  GALLBLADDER:  No calcified gallstones   No pericholecystic inflammatory change  SPLEEN:  Unremarkable  PANCREAS:  Unremarkable  ADRENAL GLANDS:  Unremarkable  KIDNEYS/URETERS:  There are areas of cortical hypodensity in both kidneys, particularly on the left  This is suspicious for pyelonephritis  No hydronephrosis  STOMACH AND BOWEL:  The patient is status post gastric bypass  Bowel is unremarkable  APPENDIX:  No findings to suggest appendicitis  ABDOMINOPELVIC CAVITY:  No ascites or free intraperitoneal air  No lymphadenopathy  VESSELS:  Unremarkable for patient's age  PELVIS REPRODUCTIVE ORGANS:  Unremarkable for patient's age  URINARY BLADDER:  Mildly thick-walled though underdistended  ABDOMINAL WALL/INGUINAL REGIONS:  Unremarkable  OSSEOUS STRUCTURES:  No acute fracture or destructive osseous lesion  Impression: Cortical hypodensity in the kidneys, left greater than right with thick-walled bladder  Given urinalysis suggestive of infection, this is suspicious for cystitis with bilateral pyelonephritis  The study was marked in [facility] for immediate notification  Workstation performed: [id number]       CT abdomen pelvis with contrast   Final Result      Cortical hypodensity in the kidneys, left greater than right with thick-walled bladder  Given urinalysis suggestive of infection, this is suspicious for cystitis with bilateral pyelonephritis  The study was marked in [facility] for immediate notification  Workstation performed: [id number]         US pelvis complete w transvaginal   Final Result          1  Trace right adnexal fluid, likely physiologic  2   No ovarian torsion  Workstation performed: [id number]             EKG, Pathology, and Other Studies Reviewed on Admission:   · No ekg done    Allscripts/EPIC Records Reviewed: Yes     ** Please Note: "This note has been constructed using a voice recognition system  Therefore there may be syntax, spelling, and/or grammatical errors   Please call if you have any questions  "**

## 2019-02-05 NOTE — ASSESSMENT & PLAN NOTE
Bilateral pyelonephritis - left greater than right  Patient was given a dose of IV Rocephin in the ER which will be continued  Urine culture ordered  Patient reports improvement in her pain with Toradol which will be continued  Transvaginal ultrasound was negative ovarian torsion  F/U pending GC/Chlamydia  Repeat lab work in a m

## 2019-02-05 NOTE — ASSESSMENT & PLAN NOTE
Patient with history of migraine headaches    Denies any headache currently  Continue Inderal, Topamax

## 2019-02-06 ENCOUNTER — TRANSITIONAL CARE MANAGEMENT (OUTPATIENT)
Dept: FAMILY MEDICINE CLINIC | Facility: CLINIC | Age: 39
End: 2019-02-06

## 2019-12-10 ENCOUNTER — TRANSCRIBE ORDERS (OUTPATIENT)
Dept: ADMINISTRATIVE | Facility: HOSPITAL | Age: 39
End: 2019-12-10

## 2019-12-10 DIAGNOSIS — R52 SHARP PAIN: Primary | ICD-10-CM

## 2019-12-16 ENCOUNTER — HOSPITAL ENCOUNTER (OUTPATIENT)
Dept: RADIOLOGY | Facility: HOSPITAL | Age: 39
Discharge: HOME | End: 2019-12-16
Payer: COMMERCIAL

## 2019-12-16 DIAGNOSIS — R52 SHARP PAIN: ICD-10-CM

## 2019-12-16 PROCEDURE — 72131 CT LUMBAR SPINE W/O DYE: CPT

## 2020-02-12 ENCOUNTER — CONSULT (OUTPATIENT)
Dept: PAIN MEDICINE | Facility: CLINIC | Age: 40
End: 2020-02-12
Payer: COMMERCIAL

## 2020-02-12 VITALS
SYSTOLIC BLOOD PRESSURE: 136 MMHG | DIASTOLIC BLOOD PRESSURE: 85 MMHG | BODY MASS INDEX: 30.21 KG/M2 | HEART RATE: 71 BPM | WEIGHT: 160 LBS | HEIGHT: 61 IN

## 2020-02-12 DIAGNOSIS — G89.4 CHRONIC PAIN SYNDROME: Primary | ICD-10-CM

## 2020-02-12 DIAGNOSIS — M54.42 CHRONIC LEFT-SIDED LOW BACK PAIN WITH LEFT-SIDED SCIATICA: ICD-10-CM

## 2020-02-12 DIAGNOSIS — M54.16 LUMBAR RADICULOPATHY: ICD-10-CM

## 2020-02-12 DIAGNOSIS — G89.29 CHRONIC LEFT-SIDED LOW BACK PAIN WITH LEFT-SIDED SCIATICA: ICD-10-CM

## 2020-02-12 DIAGNOSIS — M96.1 LUMBAR POST-LAMINECTOMY SYNDROME: ICD-10-CM

## 2020-02-12 PROBLEM — M54.50 LOW BACK PAIN: Status: ACTIVE | Noted: 2020-02-12

## 2020-02-12 PROCEDURE — 99244 OFF/OP CNSLTJ NEW/EST MOD 40: CPT | Performed by: ANESTHESIOLOGY

## 2020-02-12 RX ORDER — CYCLOBENZAPRINE HCL 10 MG
10 TABLET ORAL 3 TIMES DAILY PRN
COMMUNITY
End: 2020-03-17

## 2020-02-12 RX ORDER — LORAZEPAM 0.5 MG/1
1 TABLET ORAL EVERY 8 HOURS PRN
COMMUNITY

## 2020-02-12 RX ORDER — ACETAMINOPHEN 325 MG/1
650 TABLET ORAL EVERY 6 HOURS PRN
COMMUNITY
End: 2020-02-26

## 2020-02-12 RX ORDER — FLUOXETINE HYDROCHLORIDE 40 MG/1
80 CAPSULE ORAL DAILY
COMMUNITY

## 2020-02-12 RX ORDER — IBUPROFEN 200 MG
TABLET ORAL EVERY 6 HOURS PRN
COMMUNITY
End: 2020-02-26

## 2020-02-12 NOTE — PROGRESS NOTES
Assessment:  1  Chronic pain syndrome    2  Chronic left-sided low back pain with left-sided sciatica    3  Lumbar radiculopathy    4  Lumbar post-laminectomy syndrome        Plan:  New Medications Ordered This Visit   Medications    FLUoxetine (PROzac) 40 MG capsule     Sig: Take 40 mg by mouth daily    LORazepam 1 MG/0 5ML CONC     Sig: Take by mouth    cyclobenzaprine (FLEXERIL) 10 mg tablet     Sig: Take 10 mg by mouth 3 (three) times a day as needed for muscle spasms    acetaminophen (TYLENOL) 325 mg tablet     Sig: Take 650 mg by mouth every 6 (six) hours as needed for mild pain    ibuprofen (MOTRIN) 200 mg tablet     Sig: Take by mouth every 6 (six) hours as needed for mild pain     My impressions and treatment recommendations were discussed in detail with the patient, who verbalized understanding and had no further questions  Given that the patient has signs and symptoms of low back pain and left lower extremity radiculopathy in the left S1 distribution in the context of lumbar post-laminectomy syndrome, I discussed the rationale of undergoing a caudal epidural steroid injection since this could be potentially therapeutic  The procedures, its risks, and benefits were explained in detail to the patient  Risks include but are not limited to bleeding, infection, hematoma formation, abscess formation, weakness, headache, failure the pain to improve, nerve irritation or damage, and potential worsening of the pain  The patient verbalized understanding and wished to proceed with the procedure  In addition, I did briefly speak to the patient about the possibility of undergoing the spinal cord stimulator trial with [employer]  She was interested in learning more, so I have given her an informational packet regarding [employer] spinal cord stimulation  I will also have her speak to the representatives from [employer] spinal cord stimulation for an educational session      Follow-up is planned in 4 weeks time or sooner as warranted  Discharge instructions were provided  I personally saw and examined the patient and I agree with the above discussed plan of care  History of Present Illness:    Marcelo Pat is a 38 y o  female who presents to [facility] for initial evaluation of the above stated pain complaints  The patient has a past medical and chronic pain history as outlined in the assessment section  She was referred by Dr April Bain  The patient reports a 6 year history of low back pain and left lower extremity radicular symptoms in what appears to be the left S1 distribution  She describes her pain as severe and 10/10 on the verbal numerical pain rating scale  Her pain constant in nature and worse in the evening and night  She describes her pain as "burning, shooting, numbness, sharp, and throbbing    She reports weakness in her left lower extremity  She ambulates with the assistance of a cane  Lying down, bending, sitting, walking, and exercise increases pain  There are no pain relieving factors  She has a history of undergoing a lumbar spinal fusion in 2014 and 2015  She is reporting that lumbar spine surgery, nerve blocks, nerve injections, physical therapy, home exercises, osteopathic manipulation, psychotherapy, acupuncture, and TENS therapy all provided no relief  Heat/ice treatment and chiropractic manipulation provided moderate pain relief  Acetaminophen over-the-counter, ibuprofen over-the-counter, oxycodone, and morphine did give her relief in the past     Review of Systems:    Review of Systems   Constitutional: Negative for fever and unexpected weight change  HENT: Negative for trouble swallowing  Eyes: Negative for visual disturbance  Respiratory: Negative for shortness of breath and wheezing  Cardiovascular: Negative for chest pain and palpitations  Gastrointestinal: Negative for constipation, diarrhea, nausea and vomiting     Endocrine: Negative for cold intolerance, heat intolerance and polydipsia  Genitourinary: Negative for difficulty urinating and frequency  Musculoskeletal: Negative for arthralgias, gait problem, joint swelling and myalgias  Joint Pain   Skin: Negative for rash  Neurological: Negative for dizziness, seizures, syncope, weakness and headaches  Hematological: Does not bruise/bleed easily  Psychiatric/Behavioral: Negative for dysphoric mood  Anxiety  Depression   All other systems reviewed and are negative          Patient Active Problem List   Diagnosis    Pyelonephritis    Type 2 diabetes mellitus without complication, without long-term current use of insulin [facility]    Chronic back pain    Anxiety and depression    Migraine headache    Chronic pain syndrome    Chronic left-sided low back pain with left-sided sciatica    Lumbar radiculopathy    Lumbar post-laminectomy syndrome       Past Medical History:   Diagnosis Date    Anemia     Anxiety     Chronic left-sided low back pain with left-sided sciatica 2/12/2020    Chronic pain     Chronic pain syndrome 2/12/2020    Depression     Diabetes [address] [address]     Diabetes mellitus ([facility] [address] )     no meds       Past Surgical History:   Procedure Laterality Date    ANTERIOR / POSTERIOR COMBINED FUSION THORACIC SPINE      BACK SURGERY      GASTRIC BYPASS      JORGE-EN-Y PROCEDURE      2012       Family History   Problem Relation Age of Onset    No Known Problems Mother     No Known Problems Father     Kidney disease Maternal Grandfather        Social History     Occupational History    Not on file   Tobacco Use    Smoking status: Current Every Day Smoker    Smokeless tobacco: Never Used   Substance and Sexual Activity    Alcohol use: Yes     Comment: occasionally    Drug use: Yes     Types: Marijuana    Sexual activity: Not on file         Current Outpatient Medications:     acetaminophen (TYLENOL) 325 mg tablet, Take 650 mg by mouth every 6 (six) hours as needed for mild pain, Disp: , Rfl:     cyclobenzaprine (FLEXERIL) 10 mg tablet, Take 10 mg by mouth 3 (three) times a day as needed for muscle spasms, Disp: , Rfl:     FLUoxetine (PROzac) 40 MG capsule, Take 40 mg by mouth daily, Disp: , Rfl:     gabapentin (NEURONTIN) 800 mg tablet, Take 1,600 mg by mouth 3 (three) times a day, Disp: , Rfl:     ibuprofen (MOTRIN) 200 mg tablet, Take by mouth every 6 (six) hours as needed for mild pain, Disp: , Rfl:     LORazepam 1 MG/0 5ML CONC, Take by mouth, Disp: , Rfl:     amitriptyline (ELAVIL) 10 mg tablet, Take 1 tablet by mouth daily at bedtime, Disp: , Rfl:     busPIRone (BUSPAR) 30 MG tablet, Take 30 mg by mouth 3 (three) times a day, Disp: , Rfl:     desvenlafaxine (PRISTIQ) 100 mg 24 hr tablet, Take 100 mg by mouth daily, Disp: , Rfl:     ketorolac (TORADOL) 10 mg tablet, Take 1 tablet (10 mg total) by mouth every 6 (six) hours as needed for moderate pain for up to 3 days, Disp: 12 tablet, Rfl: 0    lisdexamfetamine (VYVANSE) 60 MG capsule, Take 60 mg by mouth every morning, Disp: , Rfl:     Lurasidone HCl (LATUDA PO), Take by mouth daily, Disp: , Rfl:     meloxicam (MOBIC) 15 mg tablet, Take 1 tablet by mouth daily, Disp: , Rfl:     morphine (MS CONTIN) 15 mg 12 hr tablet, Take 15 mg by mouth 2 (two) times a day, Disp: , Rfl:     ondansetron (ZOFRAN) 4 mg tablet, Take 1 tablet (4 mg total) by mouth every 6 (six) hours for 3 days, Disp: 9 tablet, Rfl: 0    oxyCODONE-acetaminophen (PERCOCET) 5-325 mg per tablet, Take 1 tablet by mouth every 8 (eight) hours as needed for moderate pain, Disp: , Rfl:     prazosin (MINIPRESS) 1 mg capsule, Take 1 mg by mouth 3 (three) times a day, Disp: , Rfl:     propranolol (INDERAL) 40 mg tablet, Take 40 mg by mouth 2 (two) times a day, Disp: , Rfl:     topiramate (TOPAMAX) 50 MG tablet, Take 75 mg by mouth 2 (two) times a day, Disp: , Rfl:     No Known Allergies    Physical Exam:    /85   Pulse 71   Ht 5' 1" (1 549 m)   Wt 72 6 kg (160 lb)   BMI 30 23 kg/m²     Constitutional: obese  Eyes: anicteric  HEENT: grossly intact  Neck: supple, symmetric, trachea midline and no masses   Pulmonary:even and unlabored  Cardiovascular:No edema or pitting edema present  Skin:Normal without rashes or lesions and well hydrated  Psychiatric:Mood and affect appropriate  Neurologic:Cranial Nerves II-XII grossly intact  Musculoskeletal:antalgic     Lumbar Spine Exam    Appearance:  Surgical scar in the lumbar spine region appears clean, dry, and intact  Palpation/Tenderness:  left sacroiliac joint tenderness  right sacroiliac joint tenderness  Sensory:  no sensory deficits noted except: Decreased sensation noted in the left L4 and L5 distribution as compared to the right  Range of Motion:  Flexion:  Minimally limited  with pain  Extension:  Severely limited  with pain  Lateral Flexion - Left:  Severely limited  with pain  Lateral Flexion - Right:  Severely limited  with pain  Rotation - Left:  Severely limited  with pain  Rotation - Right:  Severely limited  with pain   Lumbar facet loading is positive bilaterally  Motor Strength:  Left hip flexion:  5/5  Left hip extension:  5/5  Right hip flexion:  5/5  Right hip extension:  5/5  Left knee flexion:  5/5  Left knee extension:  5/5  Right knee flexion:  5/5  Right knee extension:  5/5  Left foot dorsiflexion:  5/5  Left foot plantar flexion:  5/5  Right foot dorsiflexion:  5/5  Right foot plantar flexion:  5/5  Reflexes:  Left Patellar:  2+   Right Patellar:  2+   Left Achilles:  2+   Right Achilles:  2+   Special Tests:  Left Straight Leg Test:  negative  Right Straight Leg Test:  negative  Left Israel's Maneuver:  positive  Right Israel's Maneuver:  positive

## 2020-02-14 ENCOUNTER — TELEPHONE (OUTPATIENT)
Dept: PAIN MEDICINE | Facility: CLINIC | Age: 40
End: 2020-02-14

## 2020-02-14 NOTE — TELEPHONE ENCOUNTER
SW patient, states she is in excruciating pain  Patient is having pain in her lower back and shooting pain in her left leg and left knee  Patient states that she needs something for pain  Ask patient what was she talking about that she needed for the [employer] representative  She said that she needs to be set up for education and for psych evaluation  Advised that our office has a designated person that handles the scheduling of the SCS and will forward this information to her  SW AS, advised of the same  AS recommendations for the patient is to either go to the ED to be evaluated or to wait until Monday and AS can place a referral for patient to see ortho for an evaluation of her left knee  Called patient and advised of the same  Patient verbalizes understanding, but did not say what she is going to do

## 2020-02-14 NOTE — TELEPHONE ENCOUNTER
Patient called in regard to the spinal stimulator rep  is requesting a script for her procedure   And she wanted to know if the Dr Akila Case can call in a prescription for pain for the patient   She has not been sleeping because of the pain  Please advise thx                Patient contact # [phone]

## 2020-02-17 ENCOUNTER — TELEPHONE (OUTPATIENT)
Dept: PAIN MEDICINE | Facility: CLINIC | Age: 40
End: 2020-02-17

## 2020-02-17 DIAGNOSIS — M96.1 LUMBAR POST-LAMINECTOMY SYNDROME: ICD-10-CM

## 2020-02-17 DIAGNOSIS — M54.16 LUMBAR RADICULOPATHY: ICD-10-CM

## 2020-02-17 DIAGNOSIS — G89.4 CHRONIC PAIN SYNDROME: Primary | ICD-10-CM

## 2020-02-17 DIAGNOSIS — M54.42 CHRONIC LEFT-SIDED LOW BACK PAIN WITH LEFT-SIDED SCIATICA: ICD-10-CM

## 2020-02-17 DIAGNOSIS — G89.29 CHRONIC LEFT-SIDED LOW BACK PAIN WITH LEFT-SIDED SCIATICA: ICD-10-CM

## 2020-02-17 NOTE — TELEPHONE ENCOUNTER
Pt to be an [employer] SCS Trial with Dr Elaine Call  Pt completed education  Attempt numbers listed to discuss moving forward, one not working, other someone answered and hung up when I asked for patient     Does not appear pt received scripts for psych eval or thoracic imaging

## 2020-02-25 NOTE — TELEPHONE ENCOUNTER
Patient would like to move forward with SCS trial  Can you please order thoracic imaging and psych eval? Thank you

## 2020-02-26 ENCOUNTER — HOSPITAL ENCOUNTER (EMERGENCY)
Facility: HOSPITAL | Age: 40
End: 2020-02-27
Attending: EMERGENCY MEDICINE | Admitting: EMERGENCY MEDICINE
Payer: COMMERCIAL

## 2020-02-26 DIAGNOSIS — F20.9 SCHIZOPHRENIA (HCC): Primary | ICD-10-CM

## 2020-02-26 LAB
ALBUMIN SERPL BCP-MCNC: 3.4 G/DL (ref 3.5–5)
ALP SERPL-CCNC: 87 U/L (ref 46–116)
ALT SERPL W P-5'-P-CCNC: 19 U/L (ref 12–78)
ANION GAP SERPL CALCULATED.3IONS-SCNC: 7 MMOL/L (ref 4–13)
AST SERPL W P-5'-P-CCNC: 18 U/L (ref 5–45)
ATRIAL RATE: 62 BPM
BASOPHILS # BLD AUTO: 0.06 THOUSANDS/ΜL (ref 0–0.1)
BASOPHILS NFR BLD AUTO: 1 % (ref 0–1)
BILIRUB SERPL-MCNC: 0.4 MG/DL (ref 0.2–1)
BUN SERPL-MCNC: 7 MG/DL (ref 5–25)
CALCIUM SERPL-MCNC: 8.2 MG/DL (ref 8.3–10.1)
CHLORIDE SERPL-SCNC: 104 MMOL/L (ref 100–108)
CO2 SERPL-SCNC: 26 MMOL/L (ref 21–32)
CREAT SERPL-MCNC: 0.76 MG/DL (ref 0.6–1.3)
EOSINOPHIL # BLD AUTO: 0.1 THOUSAND/ΜL (ref 0–0.61)
EOSINOPHIL NFR BLD AUTO: 1 % (ref 0–6)
ERYTHROCYTE [DISTWIDTH] IN BLOOD BY AUTOMATED COUNT: 14.1 % (ref 11.6–15.1)
ETHANOL SERPL-MCNC: <3 MG/DL (ref 0–3)
GFR SERPL CREATININE-BSD FRML MDRD: 98 ML/MIN/1.73SQ M
GLUCOSE SERPL-MCNC: 119 MG/DL (ref 65–140)
HCT VFR BLD AUTO: 38.7 % (ref 34.8–46.1)
HGB BLD-MCNC: 12.9 G/DL (ref 11.5–15.4)
IMM GRANULOCYTES # BLD AUTO: 0.03 THOUSAND/UL (ref 0–0.2)
IMM GRANULOCYTES NFR BLD AUTO: 0 % (ref 0–2)
LYMPHOCYTES # BLD AUTO: 1.98 THOUSANDS/ΜL (ref 0.6–4.47)
LYMPHOCYTES NFR BLD AUTO: 26 % (ref 14–44)
MCH RBC QN AUTO: 31.1 PG (ref 26.8–34.3)
MCHC RBC AUTO-ENTMCNC: 33.3 G/DL (ref 31.4–37.4)
MCV RBC AUTO: 93 FL (ref 82–98)
MONOCYTES # BLD AUTO: 0.5 THOUSAND/ΜL (ref 0.17–1.22)
MONOCYTES NFR BLD AUTO: 7 % (ref 4–12)
NEUTROPHILS # BLD AUTO: 4.91 THOUSANDS/ΜL (ref 1.85–7.62)
NEUTS SEG NFR BLD AUTO: 65 % (ref 43–75)
NRBC BLD AUTO-RTO: 0 /100 WBCS
P AXIS: 61 DEGREES
PLATELET # BLD AUTO: 415 THOUSANDS/UL (ref 149–390)
PMV BLD AUTO: 8.5 FL (ref 8.9–12.7)
POTASSIUM SERPL-SCNC: 3.7 MMOL/L (ref 3.5–5.3)
PR INTERVAL: 146 MS
PROT SERPL-MCNC: 6.8 G/DL (ref 6.4–8.2)
QRS AXIS: 66 DEGREES
QRSD INTERVAL: 78 MS
QT INTERVAL: 412 MS
QTC INTERVAL: 418 MS
RBC # BLD AUTO: 4.15 MILLION/UL (ref 3.81–5.12)
SODIUM SERPL-SCNC: 137 MMOL/L (ref 136–145)
T WAVE AXIS: 27 DEGREES
TSH SERPL DL<=0.05 MIU/L-ACNC: 1.99 UIU/ML (ref 0.36–3.74)
VENTRICULAR RATE: 62 BPM
WBC # BLD AUTO: 7.58 THOUSAND/UL (ref 4.31–10.16)

## 2020-02-26 PROCEDURE — 99285 EMERGENCY DEPT VISIT HI MDM: CPT

## 2020-02-26 PROCEDURE — 93010 ELECTROCARDIOGRAM REPORT: CPT | Performed by: INTERNAL MEDICINE

## 2020-02-26 PROCEDURE — 93005 ELECTROCARDIOGRAM TRACING: CPT

## 2020-02-26 PROCEDURE — 85025 COMPLETE CBC W/AUTO DIFF WBC: CPT | Performed by: EMERGENCY MEDICINE

## 2020-02-26 PROCEDURE — 84443 ASSAY THYROID STIM HORMONE: CPT | Performed by: EMERGENCY MEDICINE

## 2020-02-26 PROCEDURE — 80320 DRUG SCREEN QUANTALCOHOLS: CPT | Performed by: EMERGENCY MEDICINE

## 2020-02-26 PROCEDURE — 99285 EMERGENCY DEPT VISIT HI MDM: CPT | Performed by: EMERGENCY MEDICINE

## 2020-02-26 PROCEDURE — 36415 COLL VENOUS BLD VENIPUNCTURE: CPT | Performed by: EMERGENCY MEDICINE

## 2020-02-26 PROCEDURE — 80053 COMPREHEN METABOLIC PANEL: CPT | Performed by: EMERGENCY MEDICINE

## 2020-02-26 PROCEDURE — 96372 THER/PROPH/DIAG INJ SC/IM: CPT

## 2020-02-26 RX ORDER — LORAZEPAM 2 MG/ML
2 INJECTION INTRAMUSCULAR ONCE
Status: COMPLETED | OUTPATIENT
Start: 2020-02-26 | End: 2020-02-26

## 2020-02-26 RX ORDER — OLANZAPINE 10 MG/1
10 INJECTION, POWDER, LYOPHILIZED, FOR SOLUTION INTRAMUSCULAR ONCE
Status: COMPLETED | OUTPATIENT
Start: 2020-02-26 | End: 2020-02-26

## 2020-02-26 RX ADMIN — LORAZEPAM 2 MG: 2 INJECTION INTRAMUSCULAR; INTRAVENOUS at 13:28

## 2020-02-26 RX ADMIN — OLANZAPINE 10 MG: 10 INJECTION, POWDER, FOR SOLUTION INTRAMUSCULAR at 13:28

## 2020-02-26 NOTE — ED NOTES
H&P faxed to [facility] @ 15:10  [facility] / Marcello Canales informed patient is awake and out of restraints @ 16:30 - staff will be over after 17:00  [facility] / Estefanía Morrison arrived in the ER @ 17:45 to screen patient  CECILY completed by the patient so her mother and daughter can be given information - copy given to mother and original placed on patient's chart

## 2020-02-26 NOTE — ED NOTES
Patient belongings :    Shirt  Jacket  Yoga pants  Sneakers   Socks  Pack and cigarettes ( 3 cigs in pack )  Matches    Patient has personal cell phone at bedside, bra and underwear still on patient as well         Josiah Norwood  02/26/20 0915

## 2020-02-26 NOTE — ED NOTES
Patient laying on stretcher playing on personal cell phone  Offering no complaints at this time  Millry given for comfort and offered to shut lights off so patient can rest  Patient is okay with lights staying on at this time  Patient also has personal ice tea bottle at bedside  1:1 continual observation maintained        Meg Bourgeois  02/26/20 7475

## 2020-02-26 NOTE — ED NOTES
Patient offered dinner and something to drink and patient wants ice tea bottle that she brought in  Earlier to drink  Patient informed all belongings locked away and that she is not allowed to have anything except what we offer Inside of the hospital  Dinner tray ordered for patient in case she decides to eat later  Will continue to monitor  All locked restraints d/c @ 1285        Effie Webb, RN  02/26/20 671 [facility], RN  02/26/20 5516

## 2020-02-26 NOTE — ED NOTES
Patient sitting up in bed offering no complaints at this time  Calm and cooperative  1:1 remains in place        202 Alexia Galaviz, RN  02/26/20 5860

## 2020-02-26 NOTE — TELEPHONE ENCOUNTER
Scripts for thoracic MRI and psych eval with list of providers mailed to patient   Patient is aware that psych eval is needed to obtain authorization for trial

## 2020-02-26 NOTE — ED NOTES
Patient was noted to have her personal phone,and asked to give phone to staff to put with her belongings,patient became agitated,cursing,then pushed door shut  Security was called and came to room,patient placed on stretcher,kicking and screaming,cursing at staff,being vulgar towards staff,Restraints placed to all extremities and medicated for agitation  Phone found an d placed with belongings  Patient is on 1:1 observation for SI with tech at doorway       Micah Stover RN  02/26/20 031 [address] [place] [address], RN  02/26/20 3736

## 2020-02-26 NOTE — ED NOTES
Patient changed into behavior holding clothing, tearful but cooperative at this time        Meg Bourgeois  02/26/20 5757

## 2020-02-26 NOTE — ED NOTES
Patient sleeping and calm in room  Two restraints removed and 1:1 continues        202 Alexia Galaviz, RN  02/26/20 8466

## 2020-02-26 NOTE — ED NOTES
Patient still restless in room  Observed shaking her feet and still awake  1:1 continues  Patient continues in locked restraints at this time  Will continue to monitor       202 Alexia Galaviz, RN  02/26/20 4559

## 2020-02-26 NOTE — ED NOTES
2/26/20 @ [phone]:  38year-old female brought to ED by gosia and sent in by [facility] after completing a mobile outreach  Patient reported command auditory hallucinations to kill herself and others, but no one in particular  Patient reports that voices are directing her to cut her wrists or strangle herself  Patient states that she stopped taking her Haldol about a month ago because, "I didn't like the way it made me feel "  Patient said she's been experiencing halluncinations since then  Please see copy of crisis assessment for further details  Patient was voluntary, but is now involuntary and will be screened for commitment when patient is more coherent, as she required sedation    Blas Mena MS

## 2020-02-26 NOTE — ED PROVIDER NOTES
History  Chief Complaint   Patient presents with    Psychiatric Evaluation     Patient sent by [facility] after stating she is hearing voices that are telling her to hurt herself and others     Patient had a recent stressor of breaking up with her boyfriend 3 or 4 days ago  About that time she states she started hearing voices again  Patient has a history of psychosis and has heard voices in her head since she was 7years old however she had been controlled until recently  The patient was seen today by family guidance and sent in for evaluation  Patient denies any recent physical illness  She has had no fever head injury vomiting  She denies any use of intoxicants  She has recently increased her Prozac, which is affected her appetite some degree  Prior to Admission Medications   Prescriptions Last Dose Informant Patient Reported? Taking?    FLUoxetine (PROzac) 40 MG capsule Unknown at Unknown time Self Yes No   Sig: Take 80 mg by mouth daily    LORazepam (ATIVAN) 0 5 mg tablet Past Week at Unknown time Self Yes Yes   Sig: Take by mouth 3 (three) times a day    clonazePAM (KlonoPIN) 1 mg tablet Past Week at Unknown time  Yes Yes   Sig: Take 1 mg by mouth 2 (two) times a day   cyclobenzaprine (FLEXERIL) 10 mg tablet Past Week at Unknown time Self Yes Yes   Sig: Take 10 mg by mouth 3 (three) times a day as needed for muscle spasms   gabapentin (NEURONTIN) 600 MG tablet   Yes Yes   Sig: Take 600 mg by mouth 2 (two) times a day   gabapentin (NEURONTIN) 800 mg tablet Past Week at Unknown time Self Yes Yes   Sig: Take 1,200 mg by mouth 2 (two) times a day       Facility-Administered Medications: None       Past Medical History:   Diagnosis Date    Anemia     Anxiety     Chronic left-sided low back pain with left-sided sciatica 2/12/2020    Chronic pain     Chronic pain syndrome 2/12/2020    Depression     Diabetes ([facility] [address] [facility]     Diabetes mellitus ([facility] [address] [facility]     no meds    Psychiatric disorder        Past Surgical History:   Procedure Laterality Date    ANTERIOR / POSTERIOR COMBINED FUSION THORACIC SPINE      BACK SURGERY      GASTRIC BYPASS      JORGE-EN-Y PROCEDURE      2012       Family History   Problem Relation Age of Onset    No Known Problems Mother     No Known Problems Father     Kidney disease Maternal Grandfather      I have reviewed and agree with the history as documented  E-Cigarette/Vaping    E-Cigarette Use Never User      E-Cigarette/Vaping Substances    Nicotine No     THC No     CBD No     Flavoring No     Other No     Unknown No      Social History     Tobacco Use    Smoking status: Current Every Day Smoker     Packs/day: 0 50     Types: Cigarettes    Smokeless tobacco: Never Used   Substance Use Topics    Alcohol use: Yes     Comment: occasionally    Drug use: Yes     Types: Marijuana       Review of Systems   Constitutional: Positive for appetite change  Negative for chills and fever  HENT: Negative for congestion and sore throat  Eyes: Negative for photophobia and visual disturbance  Respiratory: Negative for cough  Cardiovascular: Negative for chest pain, palpitations and leg swelling  Gastrointestinal: Negative for abdominal pain and vomiting  Genitourinary: Negative for dysuria and menstrual problem  Patient status post tubal ligation 11 years ago   Musculoskeletal: Positive for back pain  Negative for neck pain  Skin: Negative for rash  Allergic/Immunologic: Negative for immunocompromised state  Neurological: Negative for weakness and headaches  Psychiatric/Behavioral: Positive for hallucinations and sleep disturbance  All other systems reviewed and are negative  Physical Exam  Physical Exam   Constitutional: She appears well-developed and well-nourished  HENT:   Head: Normocephalic and atraumatic  Mouth/Throat: Oropharynx is clear and moist    Eyes: Conjunctivae are normal    Neck: Normal range of motion  Neck supple     Cardiovascular: Normal rate, regular rhythm and normal heart sounds  Pulmonary/Chest: Effort normal and breath sounds normal    Abdominal: Soft  Bowel sounds are normal  There is no tenderness  Musculoskeletal: Normal range of motion  She exhibits no edema or deformity  Neurological: She is alert  Skin: Skin is warm and dry  Capillary refill takes less than 2 seconds  Psychiatric: She has a normal mood and affect   Her behavior is normal        Vital Signs  ED Triage Vitals   Temperature Pulse Respirations Blood Pressure SpO2   02/26/20 1146 02/26/20 1146 02/26/20 1146 02/26/20 1146 02/26/20 1146   98 4 °F (36 9 °C) 67 20 133/83 100 %      Temp Source Heart Rate Source Patient Position - Orthostatic VS BP Location FiO2 (%)   02/27/20 0544 02/26/20 1146 02/26/20 1146 02/26/20 1146 --   Oral Monitor Sitting Right arm       Pain Score       02/27/20 1303       No Pain           Vitals:    02/26/20 2027 02/27/20 0029 02/27/20 0544 02/27/20 1303   BP: 124/79 118/80 111/75 146/66   Pulse: 70 66 64 87   Patient Position - Orthostatic VS: Sitting Lying  Lying         Visual Acuity      ED Medications  Medications   OLANZapine (ZyPREXA) IM injection 10 mg (10 mg Intramuscular Given 2/26/20 1328)   LORazepam (ATIVAN) injection 2 mg (2 mg Intramuscular Given 2/26/20 1328)   LORazepam (ATIVAN) tablet 0 5 mg (0 5 mg Oral Given 2/27/20 0024)   gabapentin (NEURONTIN) capsule 800 mg (800 mg Oral Given 2/27/20 0539)   LORazepam (ATIVAN) tablet 0 5 mg (0 5 mg Oral Given 2/27/20 0539)       Diagnostic Studies  Results Reviewed     Procedure Component Value Units Date/Time    POCT pregnancy, urine [[id number]]  (Normal) Resulted:  02/27/20 0626    Lab Status:  Final result Updated:  02/27/20 0626     EXT PREG TEST UR (Ref: Negative) negative     Control valid    Rapid drug screen, urine [[id number]]  (Abnormal) Collected:  02/27/20 0561    Lab Status:  Final result Specimen:  Urine, Clean Catch Updated:  02/27/20 4673     Amph/Meth UR Negative Barbiturate Ur Negative     Benzodiazepine Urine Negative     Cocaine Urine Negative     Methadone Urine Negative     Opiate Urine Negative     PCP Ur Negative     THC Urine Positive    Narrative:       Presumptive report  If requested, specimen will be sent to reference lab for confirmation  FOR MEDICAL PURPOSES ONLY  IF CONFIRMATION NEEDED PLEASE CONTACT THE LAB WITHIN 5 DAYS  Drug Screen Cutoff Levels:  AMPHETAMINE/METHAMPHETAMINES  1000 ng/mL  BARBITURATES     200 ng/mL  BENZODIAZEPINES     200 ng/mL  COCAINE      300 ng/mL  METHADONE      300 ng/mL  OPIATES      300 ng/mL  PHENCYCLIDINE     25 ng/mL  THC       50 ng/mL      Urine Microscopic [[id number]]  (Abnormal) Collected:  02/27/20 0412    Lab Status:  Final result Specimen:  Urine, Clean Catch Updated:  02/27/20 0427     RBC, UA 0-1 /hpf      WBC, UA 20-30 /hpf      Epithelial Cells Occasional /hpf      Bacteria, UA Moderate /hpf     UA (URINE) with reflex to Scope [[id number]]  (Abnormal) Collected:  02/27/20 0412    Lab Status:  Final result Specimen:  Urine, Clean Catch Updated:  02/27/20 0419     Color, UA Yellow     Clarity, UA Cloudy     Specific Gravity, UA >=1 030     pH, UA 6 0     Leukocytes, UA Trace     Nitrite, UA Negative     Protein, UA Negative mg/dl      Glucose, UA Negative mg/dl      Ketones, UA Negative mg/dl      Urobilinogen, UA 0 2 E U /dl      Bilirubin, UA Interference- unable to analyze     Blood, UA Negative    TSH [[id number]]  (Normal) Collected:  02/26/20 1208    Lab Status:  Final result Specimen:  Blood from Arm, Right Updated:  02/26/20 1240     TSH 3RD GENERATON 1 985 uIU/mL     Narrative:       Patients undergoing fluorescein dye angiography may retain small amounts of fluorescein in the body for 48-72 hours post procedure  Samples containing fluorescein can produce falsely depressed TSH values  If the patient had this procedure,a specimen should be resubmitted post fluorescein clearance        Ethanol [[id number]] (Normal) Collected:  02/26/20 1208    Lab Status:  Final result Specimen:  Blood from Arm, Right Updated:  02/26/20 1237     Ethanol Lvl <3 mg/dL     Comprehensive metabolic panel [[id number]]  (Abnormal) Collected:  02/26/20 1208    Lab Status:  Final result Specimen:  Blood from Arm, Right Updated:  02/26/20 1231     Sodium 137 mmol/L      Potassium 3 7 mmol/L      Chloride 104 mmol/L      CO2 26 mmol/L      ANION GAP 7 mmol/L      BUN 7 mg/dL      Creatinine 0 76 mg/dL      Glucose 119 mg/dL      Calcium 8 2 mg/dL      AST 18 U/L      ALT 19 U/L      Alkaline Phosphatase 87 U/L      Total Protein 6 8 g/dL      Albumin 3 4 g/dL      Total Bilirubin 0 40 mg/dL      eGFR 98 ml/min/1 73sq m     Narrative:       National Kidney Disease Foundation guidelines for Chronic Kidney Disease (CKD):     Stage 1 with normal or high GFR (GFR > 90 mL/min/1 73 square meters)    Stage 2 Mild CKD (GFR = 60-89 mL/min/1 73 square meters)    Stage 3A Moderate CKD (GFR = 45-59 mL/min/1 73 square meters)    Stage 3B Moderate CKD (GFR = 30-44 mL/min/1 73 square meters)    Stage 4 Severe CKD (GFR = 15-29 mL/min/1 73 square meters)    Stage 5 End Stage CKD (GFR <15 mL/min/1 73 square meters)  Note: GFR calculation is accurate only with a steady state creatinine    CBC and differential [[id number]]  (Abnormal) Collected:  02/26/20 1208    Lab Status:  Final result Specimen:  Blood from Arm, Right Updated:  02/26/20 1213     WBC 7 58 Thousand/uL      RBC 4 15 Million/uL      Hemoglobin 12 9 g/dL      Hematocrit 38 7 %      MCV 93 fL      MCH 31 1 pg      MCHC 33 3 g/dL      RDW 14 1 %      MPV 8 5 fL      Platelets 330 Thousands/uL      nRBC 0 /100 WBCs      Neutrophils Relative 65 %      Immat GRANS % 0 %      Lymphocytes Relative 26 %      Monocytes Relative 7 %      Eosinophils Relative 1 %      Basophils Relative 1 %      Neutrophils Absolute 4 91 Thousands/µL      Immature Grans Absolute 0 03 Thousand/uL      Lymphocytes Absolute 1 98 Thousands/µL      Monocytes Absolute 0 50 Thousand/µL      Eosinophils Absolute 0 10 Thousand/µL      Basophils Absolute 0 06 Thousands/µL                  No orders to display              Procedures  ECG 12 Lead Documentation Only  Date/Time: 2/26/2020 12:10 PM  Performed by: Mayito Alvarado MD  Authorized by: Mayito Alvarado MD     Indications / Diagnosis:  Clearance  ECG reviewed by me, the ED Provider: yes    Patient location:  ED  Interpretation:     Interpretation: normal    Rate:     ECG rate:  62    ECG rate assessment: normal    Rhythm:     Rhythm: sinus rhythm    Ectopy:     Ectopy: none    QRS:     QRS axis:  Normal    QRS intervals:  Normal  Conduction:     Conduction: normal    ST segments:     ST segments:  Normal  T waves:     T waves: normal               ED Course                               MDM  Number of Diagnoses or Management Options  Diagnosis management comments: Will perform medical clearance for psychiatric evaluation and/or placement  Moments after being seen by crisis, patient suddenly had a violent change in behavior  She was screaming at staff slamming doors, and tried to kick a staff member  Patient required restraints both physical and chemical sedation  She appears to be responding to internal stimuli    Is medically cleared for psychiatric evaluation and/or placement        Disposition  Final diagnoses:   Schizophrenia [facility])     Time reflects when diagnosis was documented in both MDM as applicable and the Disposition within this note     Time User Action Codes Description Comment    2/27/2020 12:20 PM Isac Rand U  8  [F34 81] Disruptive mood dysregulation disorder ([facility] )     2/27/2020 12:22 PM Jay Jay Rand [F34 81] [facility]     2/27/2020 12:23 PM Brie Rand Add [F20 9] Schizophrenia [facility])       ED Disposition     ED Disposition Condition Date/Time Comment    Transfer to [facility] Feb 27, 2020 12:23 PM [facility] Camelia Conner should be transferred out to carrier [facility] and has been medically cleared  MD Documentation      Most Recent Value   Patient Condition  The patient has been stabilized such that within reasonable medical probability, no material deterioration of the patient condition or the condition of the unborn child(freddy) is likely to result from the transfer   Reason for Transfer  Level of Care needed not available at this facility   Benefits of Transfer  Continuity of care   Risks of Transfer  Increased discomfort during transfer   Accepting Physician  dr Joslyn Renee Name, [address]   Transported by (Company and Unit #)  Patricia Lamar   Sending MD dr Thomas Espinosa   Provider Certification  Risk of worsening condition      RN Documentation      Most [facility] Name, [address]   Bed Assignment  ACU   Report Given to  Stephengerhard Trinidad by [facility] and Unit #)  [facility]      Follow-up Information    None         Discharge Medication List as of 2/27/2020  1:30 PM      CONTINUE these medications which have NOT CHANGED    Details   clonazePAM (KlonoPIN) 1 mg tablet Take 1 mg by mouth 2 (two) times a day, Historical Med      cyclobenzaprine (FLEXERIL) 10 mg tablet Take 10 mg by mouth 3 (three) times a day as needed for muscle spasms, Historical Med      !! gabapentin (NEURONTIN) 600 MG tablet Take 600 mg by mouth 2 (two) times a day, Historical Med      !! gabapentin (NEURONTIN) 800 mg tablet Take 1,200 mg by mouth 2 (two) times a day , Historical Med      LORazepam (ATIVAN) 0 5 mg tablet Take by mouth 3 (three) times a day , Historical Med      FLUoxetine (PROzac) 40 MG capsule Take 80 mg by mouth daily , Historical Med       !! - Potential duplicate medications found  Please discuss with provider  No discharge procedures on file      PDMP Review     None          ED Provider  Electronically Signed by           José Miguel Harris MD  02/26/20 9578 Maryuri Bell MD  02/26/20 MD Risa  03/07/20 4037

## 2020-02-27 VITALS
SYSTOLIC BLOOD PRESSURE: 146 MMHG | WEIGHT: 155 LBS | TEMPERATURE: 97.6 F | HEART RATE: 87 BPM | BODY MASS INDEX: 29.27 KG/M2 | OXYGEN SATURATION: 97 % | DIASTOLIC BLOOD PRESSURE: 66 MMHG | HEIGHT: 61 IN | RESPIRATION RATE: 14 BRPM

## 2020-02-27 LAB
AMPHETAMINES SERPL QL SCN: NEGATIVE
BACTERIA UR QL AUTO: ABNORMAL /HPF
BARBITURATES UR QL: NEGATIVE
BENZODIAZ UR QL: NEGATIVE
BILIRUB UR QL STRIP: ABNORMAL
CLARITY UR: ABNORMAL
COCAINE UR QL: NEGATIVE
COLOR UR: YELLOW
EXT PREG TEST URINE: NEGATIVE
EXT. CONTROL ED NAV: NORMAL
GLUCOSE UR STRIP-MCNC: NEGATIVE MG/DL
HGB UR QL STRIP.AUTO: NEGATIVE
KETONES UR STRIP-MCNC: NEGATIVE MG/DL
LEUKOCYTE ESTERASE UR QL STRIP: ABNORMAL
METHADONE UR QL: NEGATIVE
NITRITE UR QL STRIP: NEGATIVE
NON-SQ EPI CELLS URNS QL MICRO: ABNORMAL /HPF
OPIATES UR QL SCN: NEGATIVE
PCP UR QL: NEGATIVE
PH UR STRIP.AUTO: 6 [PH]
PROT UR STRIP-MCNC: NEGATIVE MG/DL
RBC #/AREA URNS AUTO: ABNORMAL /HPF
SP GR UR STRIP.AUTO: >=1.03 (ref 1–1.03)
THC UR QL: POSITIVE
UROBILINOGEN UR QL STRIP.AUTO: 0.2 E.U./DL
WBC #/AREA URNS AUTO: ABNORMAL /HPF

## 2020-02-27 PROCEDURE — 93005 ELECTROCARDIOGRAM TRACING: CPT

## 2020-02-27 PROCEDURE — 81001 URINALYSIS AUTO W/SCOPE: CPT | Performed by: EMERGENCY MEDICINE

## 2020-02-27 PROCEDURE — 80307 DRUG TEST PRSMV CHEM ANLYZR: CPT | Performed by: EMERGENCY MEDICINE

## 2020-02-27 PROCEDURE — 81025 URINE PREGNANCY TEST: CPT | Performed by: EMERGENCY MEDICINE

## 2020-02-27 RX ORDER — CLONAZEPAM 1 MG/1
1 TABLET ORAL 2 TIMES DAILY
Status: DISCONTINUED | OUTPATIENT
Start: 2020-02-27 | End: 2020-02-27 | Stop reason: HOSPADM

## 2020-02-27 RX ORDER — CYCLOBENZAPRINE HCL 10 MG
10 TABLET ORAL 3 TIMES DAILY PRN
Status: DISCONTINUED | OUTPATIENT
Start: 2020-02-27 | End: 2020-02-27 | Stop reason: HOSPADM

## 2020-02-27 RX ORDER — CLONAZEPAM 1 MG/1
1 TABLET ORAL 2 TIMES DAILY
COMMUNITY
End: 2020-03-17

## 2020-02-27 RX ORDER — FLUOXETINE HYDROCHLORIDE 20 MG/1
80 CAPSULE ORAL DAILY
Status: DISCONTINUED | OUTPATIENT
Start: 2020-02-27 | End: 2020-02-27 | Stop reason: HOSPADM

## 2020-02-27 RX ORDER — GABAPENTIN 600 MG/1
600 TABLET ORAL 2 TIMES DAILY
COMMUNITY
End: 2020-03-17

## 2020-02-27 RX ORDER — LORAZEPAM 1 MG/1
1 TABLET ORAL 3 TIMES DAILY
Status: DISCONTINUED | OUTPATIENT
Start: 2020-02-27 | End: 2020-02-27 | Stop reason: HOSPADM

## 2020-02-27 RX ORDER — GABAPENTIN 300 MG/1
1200 CAPSULE ORAL 3 TIMES DAILY
Status: DISCONTINUED | OUTPATIENT
Start: 2020-02-27 | End: 2020-02-27 | Stop reason: HOSPADM

## 2020-02-27 RX ORDER — LORAZEPAM 0.5 MG/1
0.5 TABLET ORAL ONCE
Status: COMPLETED | OUTPATIENT
Start: 2020-02-27 | End: 2020-02-27

## 2020-02-27 RX ORDER — GABAPENTIN 300 MG/1
600 CAPSULE ORAL 2 TIMES DAILY
Status: DISCONTINUED | OUTPATIENT
Start: 2020-02-27 | End: 2020-02-27 | Stop reason: HOSPADM

## 2020-02-27 RX ADMIN — GABAPENTIN 800 MG: 300 CAPSULE ORAL at 05:39

## 2020-02-27 RX ADMIN — LORAZEPAM 0.5 MG: 0.5 TABLET ORAL at 05:39

## 2020-02-27 RX ADMIN — GABAPENTIN 600 MG: 300 CAPSULE ORAL at 00:25

## 2020-02-27 RX ADMIN — CYCLOBENZAPRINE HYDROCHLORIDE 10 MG: 10 TABLET, FILM COATED ORAL at 10:04

## 2020-02-27 RX ADMIN — CLONAZEPAM 1 MG: 1 TABLET ORAL at 09:54

## 2020-02-27 RX ADMIN — LORAZEPAM 0.5 MG: 0.5 TABLET ORAL at 00:24

## 2020-02-27 RX ADMIN — FLUOXETINE 80 MG: 20 CAPSULE ORAL at 12:22

## 2020-02-27 RX ADMIN — GABAPENTIN 1200 MG: 300 CAPSULE ORAL at 10:08

## 2020-02-27 RX ADMIN — LORAZEPAM 1 MG: 1 TABLET ORAL at 09:54

## 2020-02-27 NOTE — ED NOTES
Patient has family visiting @ bedside  Ambulated to bathroom w/o difficulty  1:1 continues  Patient cooperative during VS assessment        Piedad Pantoja RN  02/26/20 2027       Leon Webb RN  02/26/20 2027

## 2020-02-27 NOTE — EMTALA/ACUTE CARE TRANSFER
[facility]  [address]  [place] [address]  Dept: [address] [facility] 1980                              MRN [id number]    I have been informed of my rights regarding examination, treatment, and transfer   by Dr Ger Gloria MD    Benefits: Continuity of care    Risks: Increased discomfort during transfer      Transfer Request   I acknowledge that my medical condition has been evaluated and explained to me by the emergency department physician or other qualified medical person and/or my attending physician who has recommended and offered to me further medical examination and treatment  I understand the Hospital's obligation with respect to the treatment and stabilization of my emergency medical condition  I nevertheless request to be transferred  I release the Hospital, the doctor, and any other persons caring for me from all responsibility or liability for any injury or ill effects that may result from my transfer and agree to accept all responsibility for the consequences of my choice to transfer, rather than receive stabilizing treatment at the Hospital  I understand that because the transfer is my request, my insurance may not provide reimbursement for the services  The Hospital will assist and direct me and my family in how to make arrangements for transfer, but the hospital is not liable for any fees charged by the transport service  In spite of this understanding, I refuse to consent to further medical examination and treatment which has been offered to me, and request transfer to  Ginna Best Name, [address] : carrier clinic  I authorize the performance of emergency medical procedures and treatments upon me in both transit and upon arrival at the receiving facility    Additionally, I authorize the release of any and all medical records to the receiving facility and request they be transported with me, if possible  I authorize the performance of emergency medical procedures and treatments upon me in both transit and upon arrival at the receiving facility  Additionally, I authorize the release of any and all medical records to the receiving facility and request they be transported with me, if possible  I understand that the safest mode of transportation during a medical emergency is an ambulance and that the Hospital advocates the use of this mode of transport  Risks of traveling to the receiving facility by car, including absence of medical control, life sustaining equipment, such as oxygen, and medical personnel has been explained to me and I fully understand them  (LEONIE CORRECT BOX BELOW)  [  ]  I consent to the stated transfer and to be transported by ambulance/helicopter  [  ]  I consent to the stated transfer, but refuse transportation by ambulance and accept full responsibility for my transportation by car  I understand the risks of non-ambulance transfers and I exonerate the Hospital and its staff from any deterioration in my condition that results from this refusal     X___________________________________________    DATE  20  TIME________  Signature of patient or legally responsible individual signing on patient behalf           RELATIONSHIP TO PATIENT_________________________          Provider [address] [place] [address] 1980                              MRN [id number]    A medical screening exam was performed on the above named patient  Based on the examination:    Condition Necessitating Transfer The encounter diagnosis was Schizophrenia ([facility] [address] )      Patient Condition: The patient has been stabilized such that within reasonable medical probability, no material deterioration of the patient condition or the condition of the unborn child(freddy) is likely to result from the transfer    Reason for Transfer: Level of Care needed not available at this facility    Transfer Requirements: Facility carrier clinic   · Space available and qualified personnel available for treatment as acknowledged by    · Agreed to accept transfer and to provide appropriate medical treatment as acknowledged by       dr Allegra Nolen  · Appropriate medical records of the examination and treatment of the patient are provided at the time of transfer   [facility], [address] _______  · Transfer will be performed by qualified personnel from    and appropriate transfer equipment as required, including the use of necessary and appropriate life support measures  Provider Certification: I have examined the patient and explained the following risks and benefits of being transferred/refusing transfer to the patient/family:  Risk of worsening condition      Based on these reasonable risks and benefits to the patient and/or the unborn child(freddy), and based upon the information available at the time of the patients examination, I certify that the medical benefits reasonably to be expected from the provision of appropriate medical treatments at another medical facility outweigh the increasing risks, if any, to the individuals medical condition, and in the case of labor to the unborn child, from effecting the transfer      X____________________________________________ DATE 02/27/20        TIME_______      ORIGINAL - SEND TO MEDICAL RECORDS   COPY - SEND WITH PATIENT DURING TRANSFER

## 2020-02-27 NOTE — ED NOTES
2/27/20 @ [phone]:  PES updated by Kelsey Marcial at [facility]; patient referred to [facility] [address] [facility], [facility], [facility]  PES faxed EKG to [facility]  Filipe Hyde 92: PES called family guidance center:  Patient is accepted at [facility]rier clinic  Patient is accepted by Dr Chirag Sierra per [place]  Nurse report is to be called to [phone],  to adult unit prior to patient transfer  Ivanna Moralez [address]    1051: PES called [facility]:  They will call back with  time  1800 Elba Richardson, [address]: Received call from Aníbal Varner at [facility]:  Transportation is arranged with [employer] is scheduled for 1330  PES informed ED staff and family guidance center    Ryley Richardson MS

## 2020-02-27 NOTE — ED NOTES
Patient requesting pain medication and reports takes gabapentin three times a day      Soni Cunningham RN  02/27/20 7188

## 2020-02-27 NOTE — ED NOTES
Pt standing in doorway asking for coffee something to eat  Pt calm and cooperative  Pt given ice coffee and emily crackers  Pt now sitting in bed eating   1:1 remains for continuous observation     Todd Cotto RN  02/27/20 1911

## 2020-02-27 NOTE — ED NOTES
Patient resting on stretcher, no distress, remains on continuous observation     Travis Christie RN  02/27/20 0283

## 2020-02-27 NOTE — ED NOTES
Patient sleeping in room  Still cooperative and calm  1:1 @ bedside       Maggi Cruz RN  02/26/20 2850

## 2020-02-27 NOTE — ED NOTES
Family at bedside  Patient cooperative and calm  Patient remains on 1:1 observation        202 Alexia Galaviz, RN  02/26/20 4674

## 2020-02-27 NOTE — ED NOTES
Mother called to speak with patient  Patient talking quietly in bed       Maggi Vizcarra  02/27/20 2817

## 2020-02-27 NOTE — ED NOTES
Patient cooperative and alert  Family still @ bedside visiting  1:1 continues        202 Alexia Galaviz, RN  02/26/20 2115

## 2020-02-27 NOTE — ED NOTES
Patient called nurse to room to request Gabapentin, Ativan and something for pain  C/o back pain 6/10  MD made aware        Jesika Lemus RN  02/27/20 9991

## 2020-02-27 NOTE — EMTALA/ACUTE CARE TRANSFER
[address] [facility] [address]  [place] [address]  Dept: [phone]      EMTALA TRANSFER CONSENT    NAME Adryan Simpson                                         1980                              MRN [id number]    I have been informed of my rights regarding examination, treatment, and transfer   by Dr Dell Lucas MD    Benefits: Continuity of care    Risks: Potential for delay in receiving treatment, Increased discomfort during transfer      Transfer Request   I acknowledge that my medical condition has been evaluated and explained to me by the emergency department physician or other qualified medical person and/or my attending physician who has recommended and offered to me further medical examination and treatment  I understand the Hospital's obligation with respect to the treatment and stabilization of my emergency medical condition  I nevertheless request to be transferred  I release the Hospital, the doctor, and any other persons caring for me from all responsibility or liability for any injury or ill effects that may result from my transfer and agree to accept all responsibility for the consequences of my choice to transfer, rather than receive stabilizing treatment at the Hospital  I understand that because the transfer is my request, my insurance may not provide reimbursement for the services  The Hospital will assist and direct me and my family in how to make arrangements for transfer, but the hospital is not liable for any fees charged by the transport service  In spite of this understanding, I refuse to consent to further medical examination and treatment which has been offered to me, and request transfer to  [facility], [address] : [place]  I authorize the performance of emergency medical procedures and treatments upon me in both transit and upon arrival at the receiving facility    Additionally, I authorize the release of any and all medical records to the receiving facility and request they be transported with me, if possible  I authorize the performance of emergency medical procedures and treatments upon me in both transit and upon arrival at the receiving facility  Additionally, I authorize the release of any and all medical records to the receiving facility and request they be transported with me, if possible  I understand that the safest mode of transportation during a medical emergency is an ambulance and that the Hospital advocates the use of this mode of transport  Risks of traveling to the receiving facility by car, including absence of medical control, life sustaining equipment, such as oxygen, and medical personnel has been explained to me and I fully understand them  (LEONIE CORRECT BOX BELOW)  [  ]  I consent to the stated transfer and to be transported by ambulance/helicopter  [  ]  I consent to the stated transfer, but refuse transportation by ambulance and accept full responsibility for my transportation by car  I understand the risks of non-ambulance transfers and I exonerate the Hospital and its staff from any deterioration in my condition that results from this refusal     X___________________________________________    DATE  20  TIME________  Signature of patient or legally responsible individual signing on patient behalf           RELATIONSHIP TO PATIENT_________________________          Provider [address] [place] [address] 1980                              MRN [id number]    A medical screening exam was performed on the above named patient  Based on the examination:    Condition Necessitating Transfer The encounter diagnosis was Disruptive mood dysregulation disorder ([facility] [address] )      Patient Condition: The patient has been stabilized such that within reasonable medical probability, no material deterioration of the patient condition or the condition of the unborn child(freddy) is likely to result from the transfer    Reason for Transfer: Level of Care needed not available at this facility    Transfer Requirements: [place]   · Space available and qualified personnel available for treatment as acknowledged by    · Agreed to accept transfer and to provide appropriate medical treatment as acknowledged by       dr Ramesh Oliver  · Appropriate medical records of the examination and treatment of the patient are provided at the time of transfer   [address] [facility], [address] _______  · Transfer will be performed by qualified personnel from    and appropriate transfer equipment as required, including the use of necessary and appropriate life support measures  Provider Certification: I have examined the patient and explained the following risks and benefits of being transferred/refusing transfer to the patient/family:  Risk of worsening condition      Based on these reasonable risks and benefits to the patient and/or the unborn child(freddy), and based upon the information available at the time of the patients examination, I certify that the medical benefits reasonably to be expected from the provision of appropriate medical treatments at another medical facility outweigh the increasing risks, if any, to the individuals medical condition, and in the case of labor to the unborn child, from effecting the transfer      X____________________________________________ DATE 02/27/20        TIME_______      ORIGINAL - SEND TO MEDICAL RECORDS   COPY - SEND WITH PATIENT DURING TRANSFER

## 2020-02-27 NOTE — ED NOTES
Patient sitting on side of bed watching TV, cooperative and calm  1:1 continues        Effie Webb RN  02/27/20 1907

## 2020-02-27 NOTE — ED NOTES
Patient requesting medication for pain, doctor aware, no orders at this time     Broadus Goltz, RN  02/27/20 9327

## 2020-03-02 LAB
ATRIAL RATE: 71 BPM
P AXIS: 59 DEGREES
PR INTERVAL: 150 MS
QRS AXIS: 65 DEGREES
QRSD INTERVAL: 82 MS
QT INTERVAL: 406 MS
QTC INTERVAL: 441 MS
T WAVE AXIS: 27 DEGREES
VENTRICULAR RATE: 71 BPM

## 2020-03-02 PROCEDURE — 93010 ELECTROCARDIOGRAM REPORT: CPT | Performed by: INTERNAL MEDICINE

## 2020-03-16 ENCOUNTER — HOSPITAL ENCOUNTER (EMERGENCY)
Facility: HOSPITAL | Age: 40
End: 2020-03-17
Attending: EMERGENCY MEDICINE | Admitting: EMERGENCY MEDICINE
Payer: COMMERCIAL

## 2020-03-16 DIAGNOSIS — F25.9 SCHIZOAFFECTIVE DISORDER (HCC): Primary | ICD-10-CM

## 2020-03-16 LAB
ALBUMIN SERPL BCP-MCNC: 2.9 G/DL (ref 3.5–5)
ALP SERPL-CCNC: 82 U/L (ref 46–116)
ALT SERPL W P-5'-P-CCNC: 37 U/L (ref 12–78)
AMPHETAMINES SERPL QL SCN: NEGATIVE
ANION GAP SERPL CALCULATED.3IONS-SCNC: 8 MMOL/L (ref 4–13)
AST SERPL W P-5'-P-CCNC: 32 U/L (ref 5–45)
BACTERIA UR QL AUTO: ABNORMAL /HPF
BARBITURATES UR QL: NEGATIVE
BASOPHILS # BLD AUTO: 0.04 THOUSANDS/ΜL (ref 0–0.1)
BASOPHILS NFR BLD AUTO: 1 % (ref 0–1)
BENZODIAZ UR QL: NEGATIVE
BILIRUB SERPL-MCNC: 0.1 MG/DL (ref 0.2–1)
BILIRUB UR QL STRIP: NEGATIVE
BUN SERPL-MCNC: 15 MG/DL (ref 5–25)
CALCIUM SERPL-MCNC: 8.5 MG/DL (ref 8.3–10.1)
CHLORIDE SERPL-SCNC: 102 MMOL/L (ref 100–108)
CLARITY UR: CLEAR
CO2 SERPL-SCNC: 28 MMOL/L (ref 21–32)
COCAINE UR QL: NEGATIVE
COLOR UR: YELLOW
CREAT SERPL-MCNC: 0.61 MG/DL (ref 0.6–1.3)
EOSINOPHIL # BLD AUTO: 0.17 THOUSAND/ΜL (ref 0–0.61)
EOSINOPHIL NFR BLD AUTO: 3 % (ref 0–6)
ERYTHROCYTE [DISTWIDTH] IN BLOOD BY AUTOMATED COUNT: 14.9 % (ref 11.6–15.1)
ETHANOL SERPL-MCNC: 3 MG/DL (ref 0–3)
EXT PREG TEST URINE: NEGATIVE
EXT. CONTROL ED NAV: NORMAL
GFR SERPL CREATININE-BSD FRML MDRD: 113 ML/MIN/1.73SQ M
GLUCOSE SERPL-MCNC: 125 MG/DL (ref 65–140)
GLUCOSE UR STRIP-MCNC: NEGATIVE MG/DL
HCT VFR BLD AUTO: 36.4 % (ref 34.8–46.1)
HGB BLD-MCNC: 11.5 G/DL (ref 11.5–15.4)
HGB UR QL STRIP.AUTO: ABNORMAL
IMM GRANULOCYTES # BLD AUTO: 0.04 THOUSAND/UL (ref 0–0.2)
IMM GRANULOCYTES NFR BLD AUTO: 1 % (ref 0–2)
KETONES UR STRIP-MCNC: NEGATIVE MG/DL
LEUKOCYTE ESTERASE UR QL STRIP: NEGATIVE
LYMPHOCYTES # BLD AUTO: 1.68 THOUSANDS/ΜL (ref 0.6–4.47)
LYMPHOCYTES NFR BLD AUTO: 26 % (ref 14–44)
MCH RBC QN AUTO: 30.2 PG (ref 26.8–34.3)
MCHC RBC AUTO-ENTMCNC: 31.6 G/DL (ref 31.4–37.4)
MCV RBC AUTO: 96 FL (ref 82–98)
METHADONE UR QL: NEGATIVE
MONOCYTES # BLD AUTO: 0.54 THOUSAND/ΜL (ref 0.17–1.22)
MONOCYTES NFR BLD AUTO: 8 % (ref 4–12)
NEUTROPHILS # BLD AUTO: 4.02 THOUSANDS/ΜL (ref 1.85–7.62)
NEUTS SEG NFR BLD AUTO: 61 % (ref 43–75)
NITRITE UR QL STRIP: NEGATIVE
NON-SQ EPI CELLS URNS QL MICRO: ABNORMAL /HPF
NRBC BLD AUTO-RTO: 0 /100 WBCS
OPIATES UR QL SCN: NEGATIVE
PCP UR QL: NEGATIVE
PH UR STRIP.AUTO: 8 [PH]
PLATELET # BLD AUTO: 425 THOUSANDS/UL (ref 149–390)
PMV BLD AUTO: 9 FL (ref 8.9–12.7)
POTASSIUM SERPL-SCNC: 3.9 MMOL/L (ref 3.5–5.3)
PROT SERPL-MCNC: 6.2 G/DL (ref 6.4–8.2)
PROT UR STRIP-MCNC: NEGATIVE MG/DL
RBC # BLD AUTO: 3.81 MILLION/UL (ref 3.81–5.12)
RBC #/AREA URNS AUTO: ABNORMAL /HPF
SODIUM SERPL-SCNC: 138 MMOL/L (ref 136–145)
SP GR UR STRIP.AUTO: 1.01 (ref 1–1.03)
THC UR QL: POSITIVE
UROBILINOGEN UR QL STRIP.AUTO: 0.2 E.U./DL
WBC # BLD AUTO: 6.49 THOUSAND/UL (ref 4.31–10.16)
WBC #/AREA URNS AUTO: ABNORMAL /HPF

## 2020-03-16 PROCEDURE — 81025 URINE PREGNANCY TEST: CPT | Performed by: EMERGENCY MEDICINE

## 2020-03-16 PROCEDURE — 99285 EMERGENCY DEPT VISIT HI MDM: CPT

## 2020-03-16 PROCEDURE — 80307 DRUG TEST PRSMV CHEM ANLYZR: CPT | Performed by: EMERGENCY MEDICINE

## 2020-03-16 PROCEDURE — 80320 DRUG SCREEN QUANTALCOHOLS: CPT | Performed by: EMERGENCY MEDICINE

## 2020-03-16 PROCEDURE — 81001 URINALYSIS AUTO W/SCOPE: CPT | Performed by: EMERGENCY MEDICINE

## 2020-03-16 PROCEDURE — 99285 EMERGENCY DEPT VISIT HI MDM: CPT | Performed by: EMERGENCY MEDICINE

## 2020-03-16 PROCEDURE — 85025 COMPLETE CBC W/AUTO DIFF WBC: CPT | Performed by: EMERGENCY MEDICINE

## 2020-03-16 PROCEDURE — 36415 COLL VENOUS BLD VENIPUNCTURE: CPT | Performed by: EMERGENCY MEDICINE

## 2020-03-16 PROCEDURE — 80053 COMPREHEN METABOLIC PANEL: CPT | Performed by: EMERGENCY MEDICINE

## 2020-03-16 RX ORDER — NICOTINE 21 MG/24HR
21 PATCH, TRANSDERMAL 24 HOURS TRANSDERMAL ONCE
Status: COMPLETED | OUTPATIENT
Start: 2020-03-16 | End: 2020-03-17

## 2020-03-16 RX ORDER — IBUPROFEN 600 MG/1
600 TABLET ORAL ONCE
Status: COMPLETED | OUTPATIENT
Start: 2020-03-16 | End: 2020-03-16

## 2020-03-16 RX ADMIN — NICOTINE 21 MG: 21 PATCH, EXTENDED RELEASE TRANSDERMAL at 22:29

## 2020-03-16 RX ADMIN — IBUPROFEN 600 MG: 600 TABLET, FILM COATED ORAL at 20:37

## 2020-03-16 NOTE — ED NOTES
Pt belongings:   Water bottle  Shirt  Sweatshirt   Pants  Shoes  Socks  3 rings in denture cup      1 bag locked in unmarked locker        Jennifer All  03/16/20 5660

## 2020-03-16 NOTE — ED PROVIDER NOTES
History  Chief Complaint   Patient presents with    Suicidal     Patient arrives via BLS with c/o suicidal thoughts  Patient states she is positive for auditory hallucinations that are getting aggressive and telling her to hurt self/others  Patient states does not think medications are working  HPI    38 year female that presents with suicidal ideations  Patient states he is having voices that are telling to hurt herself  Denies any drug or alcohol use  Patient was recently seen admitted to the psych facility  Patient already has history of psychiatric disorder chronic pain  Will do a medical evaluation crisis evaluate patient patient needs to be evaluated  Patient is medically cleared    Prior to Admission Medications   Prescriptions Last Dose Informant Patient Reported? Taking?    FLUoxetine (PROzac) 40 MG capsule  Self Yes No   Sig: Take 80 mg by mouth daily    LORazepam (ATIVAN) 0 5 mg tablet  Self Yes No   Sig: Take by mouth 3 (three) times a day    clonazePAM (KlonoPIN) 1 mg tablet   Yes No   Sig: Take 1 mg by mouth 2 (two) times a day   cyclobenzaprine (FLEXERIL) 10 mg tablet  Self Yes No   Sig: Take 10 mg by mouth 3 (three) times a day as needed for muscle spasms   gabapentin (NEURONTIN) 600 MG tablet   Yes No   Sig: Take 600 mg by mouth 2 (two) times a day   gabapentin (NEURONTIN) 800 mg tablet  Self Yes No   Sig: Take 1,200 mg by mouth 2 (two) times a day       Facility-Administered Medications: None       Past Medical History:   Diagnosis Date    Anemia     Anxiety     Chronic left-sided low back pain with left-sided sciatica 2/12/2020    Chronic pain     Chronic pain syndrome 2/12/2020    Depression     Diabetes ([facility] [address] )     Diabetes mellitus ([facility] [address] )     no meds    Psychiatric disorder        Past Surgical History:   Procedure Laterality Date    ANTERIOR / POSTERIOR COMBINED FUSION THORACIC SPINE      BACK SURGERY      GASTRIC BYPASS      JORGE-EN-Y PROCEDURE      2012       Family History   Problem Relation Age of Onset    No Known Problems Mother     No Known Problems Father     Kidney disease Maternal Grandfather      I have reviewed and agree with the history as documented  E-Cigarette/Vaping    E-Cigarette Use Never User      E-Cigarette/Vaping Substances    Nicotine No     THC No     CBD No     Flavoring No     Other No     Unknown No      Social History     Tobacco Use    Smoking status: Current Every Day Smoker     Packs/day: 0 50     Types: Cigarettes    Smokeless tobacco: Never Used   Substance Use Topics    Alcohol use: Yes     Comment: occasionally    Drug use: Yes     Types: Marijuana       Review of Systems   Constitutional: Negative  Negative for diaphoresis and fever  HENT: Negative  Respiratory: Negative  Negative for cough, shortness of breath and wheezing  Cardiovascular: Negative  Negative for chest pain, palpitations and leg swelling  Gastrointestinal: Negative for abdominal distention, abdominal pain, nausea and vomiting  Genitourinary: Negative  Musculoskeletal: Negative  Skin: Negative  Neurological: Negative  Psychiatric/Behavioral: Negative  All other systems reviewed and are negative  Physical Exam  Physical Exam   Constitutional: She is oriented to person, place, and time  She appears well-developed  HENT:   Head: Normocephalic and atraumatic  Eyes: Pupils are equal, round, and reactive to light  EOM are normal    Neck: Normal range of motion  Neck supple  Cardiovascular: Normal rate, regular rhythm and normal heart sounds  No murmur heard  Pulmonary/Chest: Effort normal and breath sounds normal    Abdominal: Soft  Bowel sounds are normal  She exhibits no distension  There is no tenderness  There is no rebound and no guarding  Musculoskeletal: Normal range of motion  Neurological: She is alert and oriented to person, place, and time  Skin: Skin is warm and dry     Psychiatric: She has a normal mood and affect         Vital Signs  ED Triage Vitals [03/16/20 1359]   Temperature Pulse Respirations Blood Pressure SpO2   98 1 °F (36 7 °C) 92 20 130/65 96 %      Temp Source Heart Rate Source Patient Position - Orthostatic VS BP Location FiO2 (%)   Oral Monitor Sitting Right arm --      Pain Score       --           Vitals:    03/16/20 1359   BP: 130/65   Pulse: 92   Patient Position - Orthostatic VS: Sitting         Visual Acuity      ED Medications  Medications - No data to display    Diagnostic Studies  Results Reviewed     Procedure Component Value Units Date/Time    Comprehensive metabolic panel [[id number]]  (Abnormal) Collected:  03/16/20 1504    Lab Status:  Final result Specimen:  Blood from Arm, Right Updated:  03/16/20 1540     Sodium 138 mmol/L      Potassium 3 9 mmol/L      Chloride 102 mmol/L      CO2 28 mmol/L      ANION GAP 8 mmol/L      BUN 15 mg/dL      Creatinine 0 61 mg/dL      Glucose 125 mg/dL      Calcium 8 5 mg/dL      AST 32 U/L      ALT 37 U/L      Alkaline Phosphatase 82 U/L      Total Protein 6 2 g/dL      Albumin 2 9 g/dL      Total Bilirubin 0 10 mg/dL      eGFR 113 ml/min/1 73sq m     Narrative:       [place] guidelines for Chronic Kidney Disease (CKD):     Stage 1 with normal or high GFR (GFR > 90 mL/min/1 73 square meters)    Stage 2 Mild CKD (GFR = 60-89 mL/min/1 73 square meters)    Stage 3A Moderate CKD (GFR = 45-59 mL/min/1 73 square meters)    Stage 3B Moderate CKD (GFR = 30-44 mL/min/1 73 square meters)    Stage 4 Severe CKD (GFR = 15-29 mL/min/1 73 square meters)    Stage 5 End Stage CKD (GFR <15 mL/min/1 73 square meters)  Note: GFR calculation is accurate only with a steady state creatinine    Urine Microscopic [[id number]]  (Abnormal) Collected:  03/16/20 1513    Lab Status:  Final result Specimen:  Urine Updated:  03/16/20 1533     RBC, UA 2-4 /hpf      WBC, UA None Seen /hpf      Epithelial Cells Occasional /hpf      Bacteria, UA Occasional /hpf     POCT pregnancy, urine [[id number]]  (Normal) Resulted:  03/16/20 1532    Lab Status:  Final result Updated:  03/16/20 1532     EXT PREG TEST UR (Ref: Negative) negative     Control valid    Rapid drug screen, urine [[id number]]  (Abnormal) Collected:  03/16/20 1513    Lab Status:  Final result Specimen:  Urine Updated:  03/16/20 1531     Amph/Meth UR Negative     Barbiturate Ur Negative     Benzodiazepine Urine Negative     Cocaine Urine Negative     Methadone Urine Negative     Opiate Urine Negative     PCP Ur Negative     THC Urine Positive    Narrative:       Presumptive report  If requested, specimen will be sent to reference lab for confirmation  FOR MEDICAL PURPOSES ONLY  IF CONFIRMATION NEEDED PLEASE CONTACT THE LAB WITHIN 5 DAYS      Drug Screen Cutoff Levels:  AMPHETAMINE/METHAMPHETAMINES  1000 ng/mL  BARBITURATES     200 ng/mL  BENZODIAZEPINES     200 ng/mL  COCAINE      300 ng/mL  METHADONE      300 ng/mL  OPIATES      300 ng/mL  PHENCYCLIDINE     25 ng/mL  THC       50 ng/mL      Ethanol [[id number]]  (Normal) Collected:  03/16/20 1504    Lab Status:  Final result Specimen:  Blood from Arm, Right Updated:  03/16/20 1530     Ethanol Lvl 3 mg/dL     UA (URINE) with reflex to Scope [[id number]]  (Abnormal) Collected:  03/16/20 1513    Lab Status:  Final result Specimen:  Urine Updated:  03/16/20 1519     Color, UA Yellow     Clarity, UA Clear     Specific Gravity, UA 1 010     pH, UA 8 0     Leukocytes, UA Negative     Nitrite, UA Negative     Protein, UA Negative mg/dl      Glucose, UA Negative mg/dl      Ketones, UA Negative mg/dl      Urobilinogen, UA 0 2 E U /dl      Bilirubin, UA Negative     Blood, UA Moderate    CBC and differential [[id number]]  (Abnormal) Collected:  03/16/20 1503    Lab Status:  Final result Specimen:  Blood from Arm, Right Updated:  03/16/20 1513     WBC 6 49 Thousand/uL      RBC 3 81 Million/uL      Hemoglobin 11 5 g/dL      Hematocrit 36 4 %      MCV 96 fL      MCH 30 2 pg      MCHC 31 6 g/dL      RDW 14 9 %      MPV 9 0 fL      Platelets 507 Thousands/uL      nRBC 0 /100 WBCs      Neutrophils Relative 61 %      Immat GRANS % 1 %      Lymphocytes Relative 26 %      Monocytes Relative 8 %      Eosinophils Relative 3 %      Basophils Relative 1 %      Neutrophils Absolute 4 02 Thousands/µL      Immature Grans Absolute 0 04 Thousand/uL      Lymphocytes Absolute 1 68 Thousands/µL      Monocytes Absolute 0 54 Thousand/µL      Eosinophils Absolute 0 17 Thousand/µL      Basophils Absolute 0 04 Thousands/µL                  No orders to display              Procedures  Procedures         ED Course                                 MDM      Disposition  Final diagnoses:   None     ED Disposition     None      Follow-up Information    None         Patient's Medications   Discharge Prescriptions    No medications on file     No discharge procedures on file      PDMP Review     None          ED Provider  Electronically Signed by           Frankie Delgado MD  03/16/20 8815
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
## 2020-03-16 NOTE — ED NOTES
Orlando Atkinson / Lonnie Weeks notified that this patient will be a screening once cleared  Faxed chart to Orlando Atkinson @ 16:10 - called to verify receipt - 17:00 staff will be over  Went to check on the patient @ 17:35 - she was doing an intake with [facility] via phone - for about an hour  Orlando Atkinson / Xavi Peters arrived in the ER to screen patient @ 18:00  Patient will be tele-psyched  Orlando Atkinson / Orly Howell arrived about 22:20 to complete the tele-psych  The patient was committed  [facility] - Vinnie Dawson [phone] needs to be notified of the disposition of this case - AS SOON AS IT IS KNOWN  (Charge Rn / Becky Gloria informed and this note placed on the patient's chart)  Maryanne Heart called at 22:45 - voice mail was left

## 2020-03-17 VITALS
BODY MASS INDEX: 30.21 KG/M2 | TEMPERATURE: 98.6 F | DIASTOLIC BLOOD PRESSURE: 75 MMHG | HEART RATE: 64 BPM | SYSTOLIC BLOOD PRESSURE: 132 MMHG | WEIGHT: 160 LBS | HEIGHT: 61 IN | OXYGEN SATURATION: 98 % | RESPIRATION RATE: 18 BRPM

## 2020-03-17 PROCEDURE — 93005 ELECTROCARDIOGRAM TRACING: CPT

## 2020-03-17 PROCEDURE — 96372 THER/PROPH/DIAG INJ SC/IM: CPT

## 2020-03-17 RX ORDER — ARIPIPRAZOLE 10 MG/1
10 TABLET ORAL DAILY
Status: DISCONTINUED | OUTPATIENT
Start: 2020-03-17 | End: 2020-03-17

## 2020-03-17 RX ORDER — LORAZEPAM 2 MG/ML
INJECTION INTRAMUSCULAR
Status: COMPLETED
Start: 2020-03-17 | End: 2020-03-17

## 2020-03-17 RX ORDER — ACETAMINOPHEN 325 MG/1
650 TABLET ORAL ONCE
Status: COMPLETED | OUTPATIENT
Start: 2020-03-17 | End: 2020-03-17

## 2020-03-17 RX ORDER — ACETAMINOPHEN 325 MG/1
650 TABLET ORAL ONCE
Status: DISCONTINUED | OUTPATIENT
Start: 2020-03-17 | End: 2020-03-17

## 2020-03-17 RX ORDER — CHLORPROMAZINE HYDROCHLORIDE 100 MG/1
100 TABLET, FILM COATED ORAL 2 TIMES DAILY
COMMUNITY
End: 2020-04-27

## 2020-03-17 RX ORDER — OLANZAPINE 10 MG/1
INJECTION, POWDER, LYOPHILIZED, FOR SOLUTION INTRAMUSCULAR
Status: COMPLETED
Start: 2020-03-17 | End: 2020-03-17

## 2020-03-17 RX ORDER — DIPHENHYDRAMINE HCL 25 MG
50 TABLET ORAL EVERY 6 HOURS PRN
Status: DISCONTINUED | OUTPATIENT
Start: 2020-03-17 | End: 2020-03-17 | Stop reason: HOSPADM

## 2020-03-17 RX ORDER — IBUPROFEN 400 MG/1
400 TABLET ORAL ONCE
Status: DISCONTINUED | OUTPATIENT
Start: 2020-03-17 | End: 2020-03-17

## 2020-03-17 RX ORDER — DOXEPIN HYDROCHLORIDE 25 MG/1
50 CAPSULE ORAL
Status: DISCONTINUED | OUTPATIENT
Start: 2020-03-17 | End: 2020-03-17 | Stop reason: HOSPADM

## 2020-03-17 RX ORDER — ZOLPIDEM TARTRATE 5 MG/1
5 TABLET ORAL
COMMUNITY
End: 2020-04-27 | Stop reason: ALTCHOICE

## 2020-03-17 RX ORDER — FLUOXETINE 10 MG/1
40 CAPSULE ORAL DAILY
Status: DISCONTINUED | OUTPATIENT
Start: 2020-03-17 | End: 2020-03-17 | Stop reason: HOSPADM

## 2020-03-17 RX ORDER — DOXEPIN HYDROCHLORIDE 50 MG/1
50 CAPSULE ORAL
COMMUNITY
End: 2020-04-27 | Stop reason: ALTCHOICE

## 2020-03-17 RX ORDER — LORAZEPAM 1 MG/1
1 TABLET ORAL EVERY 6 HOURS PRN
Status: DISCONTINUED | OUTPATIENT
Start: 2020-03-17 | End: 2020-03-17 | Stop reason: HOSPADM

## 2020-03-17 RX ORDER — GABAPENTIN 300 MG/1
1200 CAPSULE ORAL 3 TIMES DAILY
Status: DISCONTINUED | OUTPATIENT
Start: 2020-03-17 | End: 2020-03-17 | Stop reason: HOSPADM

## 2020-03-17 RX ORDER — HALOPERIDOL 1 MG/1
5 TABLET ORAL EVERY 6 HOURS PRN
Status: DISCONTINUED | OUTPATIENT
Start: 2020-03-17 | End: 2020-03-17 | Stop reason: HOSPADM

## 2020-03-17 RX ORDER — OLANZAPINE 10 MG/1
10 INJECTION, POWDER, LYOPHILIZED, FOR SOLUTION INTRAMUSCULAR ONCE
Status: COMPLETED | OUTPATIENT
Start: 2020-03-17 | End: 2020-03-17

## 2020-03-17 RX ORDER — PROPRANOLOL HYDROCHLORIDE 20 MG/1
40 TABLET ORAL 2 TIMES DAILY
Status: DISCONTINUED | OUTPATIENT
Start: 2020-03-17 | End: 2020-03-17 | Stop reason: HOSPADM

## 2020-03-17 RX ORDER — PROPRANOLOL HYDROCHLORIDE 40 MG/1
40 TABLET ORAL 2 TIMES DAILY
COMMUNITY

## 2020-03-17 RX ORDER — LORAZEPAM 2 MG/ML
2 INJECTION INTRAMUSCULAR ONCE
Status: COMPLETED | OUTPATIENT
Start: 2020-03-17 | End: 2020-03-17

## 2020-03-17 RX ORDER — ZOLPIDEM TARTRATE 5 MG/1
5 TABLET ORAL
Status: DISCONTINUED | OUTPATIENT
Start: 2020-03-17 | End: 2020-03-17 | Stop reason: HOSPADM

## 2020-03-17 RX ORDER — CHLORPROMAZINE HYDROCHLORIDE 25 MG/1
100 TABLET, FILM COATED ORAL DAILY
Status: DISCONTINUED | OUTPATIENT
Start: 2020-03-17 | End: 2020-03-17 | Stop reason: HOSPADM

## 2020-03-17 RX ADMIN — ACETAMINOPHEN 650 MG: 325 TABLET, FILM COATED ORAL at 19:38

## 2020-03-17 RX ADMIN — OLANZAPINE 10 MG: 10 INJECTION, POWDER, LYOPHILIZED, FOR SOLUTION INTRAMUSCULAR at 00:47

## 2020-03-17 RX ADMIN — PROPRANOLOL HYDROCHLORIDE 40 MG: 20 TABLET ORAL at 10:14

## 2020-03-17 RX ADMIN — ACETAMINOPHEN 650 MG: 325 TABLET, FILM COATED ORAL at 11:18

## 2020-03-17 RX ADMIN — FLUOXETINE 40 MG: 10 CAPSULE ORAL at 10:11

## 2020-03-17 RX ADMIN — CHLORPROMAZINE HYDROCHLORIDE 100 MG: 25 TABLET, FILM COATED ORAL at 10:12

## 2020-03-17 RX ADMIN — PROPRANOLOL HYDROCHLORIDE 40 MG: 20 TABLET ORAL at 19:38

## 2020-03-17 RX ADMIN — GABAPENTIN 1200 MG: 300 CAPSULE ORAL at 15:59

## 2020-03-17 RX ADMIN — OLANZAPINE 10 MG: 10 INJECTION, POWDER, FOR SOLUTION INTRAMUSCULAR at 00:47

## 2020-03-17 RX ADMIN — LORAZEPAM 2 MG: 2 INJECTION INTRAMUSCULAR at 00:47

## 2020-03-17 RX ADMIN — LORAZEPAM 2 MG: 2 INJECTION INTRAMUSCULAR; INTRAVENOUS at 00:47

## 2020-03-17 RX ADMIN — GABAPENTIN 1200 MG: 300 CAPSULE ORAL at 10:10

## 2020-03-17 NOTE — ED NOTES
Lying quietly on stretcher, watching TV and talking on phone with mother  States pain left leg from her chronic back pain   [facility], RN  03/17/20 9037

## 2020-03-17 NOTE — ED NOTES
Patient woke and requested medication and stated "her legs are really hurting "     Karen Gamino  03/17/20 4906

## 2020-03-17 NOTE — EMTALA/ACUTE CARE TRANSFER
[address] [facility] [address]  [place] [address]  Dept: [phone]      EMTALA TRANSFER CONSENT    NAME Beryle Ashing                                         1980                              MRN [id number]    I have been informed of my rights regarding examination, treatment, and transfer   by Dr Brea Yao MD    Benefits: Continuity of care    Risks: Potential for delay in receiving treatment      Transfer Request   I acknowledge that my medical condition has been evaluated and explained to me by the emergency department physician or other qualified medical person and/or my attending physician who has recommended and offered to me further medical examination and treatment  I understand the Hospital's obligation with respect to the treatment and stabilization of my emergency medical condition  I nevertheless request to be transferred  I release the Hospital, the doctor, and any other persons caring for me from all responsibility or liability for any injury or ill effects that may result from my transfer and agree to accept all responsibility for the consequences of my choice to transfer, rather than receive stabilizing treatment at the Hospital  I understand that because the transfer is my request, my insurance may not provide reimbursement for the services  The Hospital will assist and direct me and my family in how to make arrangements for transfer, but the hospital is not liable for any fees charged by the transport service  In spite of this understanding, I refuse to consent to further medical examination and treatment which has been offered to me, and request transfer to  [facility], [address] : [address]  I authorize the performance of emergency medical procedures and treatments upon me in both transit and upon arrival at the receiving facility    Additionally, I authorize the release of any and all medical records to the receiving facility and request they be transported with me, if possible  I authorize the performance of emergency medical procedures and treatments upon me in both transit and upon arrival at the receiving facility  Additionally, I authorize the release of any and all medical records to the receiving facility and request they be transported with me, if possible  I understand that the safest mode of transportation during a medical emergency is an ambulance and that the Hospital advocates the use of this mode of transport  Risks of traveling to the receiving facility by car, including absence of medical control, life sustaining equipment, such as oxygen, and medical personnel has been explained to me and I fully understand them  (LEONIE CORRECT BOX BELOW)  [  ]  I consent to the stated transfer and to be transported by ambulance/helicopter  [  ]  I consent to the stated transfer, but refuse transportation by ambulance and accept full responsibility for my transportation by car  I understand the risks of non-ambulance transfers and I exonerate the Hospital and its staff from any deterioration in my condition that results from this refusal     X___________________________________________    DATE  20  TIME________  Signature of patient or legally responsible individual signing on patient behalf           RELATIONSHIP TO PATIENT_________________________          Provider [address] [place] [address] 1980                              MRN [id number]    A medical screening exam was performed on the above named patient  Based on the examination:    Condition Necessitating Transfer The encounter diagnosis was Schizoaffective disorder ([facility] [address] )      Patient Condition: The patient has been stabilized such that within reasonable medical probability, no material deterioration of the patient condition or the condition of the unborn child(freddy) is likely to result from the transfer    Reason for Transfer: Level of Care needed not available at this facility    Transfer Requirements: [address]   · Space available and qualified personnel available for treatment as acknowledged by    · Agreed to accept transfer and to provide appropriate medical treatment as acknowledged by       Dr Allyssa Gallegos  · Appropriate medical records of the examination and treatment of the patient are provided at the time of transfer   [address], [address] _______  · Transfer will be performed by qualified personnel from    and appropriate transfer equipment as required, including the use of necessary and appropriate life support measures  Provider Certification: I have examined the patient and explained the following risks and benefits of being transferred/refusing transfer to the patient/family:  General risk, such as traffic hazards, adverse weather conditions, rough terrain or turbulence, possible failure of equipment (including vehicle or aircraft), or consequences of actions of persons outside the control of the transport personnel      Based on these reasonable risks and benefits to the patient and/or the unborn child(freddy), and based upon the information available at the time of the patients examination, I certify that the medical benefits reasonably to be expected from the provision of appropriate medical treatments at another medical facility outweigh the increasing risks, if any, to the individuals medical condition, and in the case of labor to the unborn child, from effecting the transfer      X____________________________________________ DATE 03/17/20        TIME_______      ORIGINAL - SEND TO MEDICAL RECORDS   COPY - SEND WITH PATIENT DURING TRANSFER
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
## 2020-03-17 NOTE — ED NOTES
Pt sitting in bed talking with daughter  In good spirits  No distress noted  1:1 continues for pt safety       Izabella Holm RN  03/17/20 1955

## 2020-03-17 NOTE — EMTALA/ACUTE CARE TRANSFER
[address] [address]  Dept: [phone]      EMTALA TRANSFER CONSENT    NAME Gagan Farris                                         1980                              MRN [id number]    I have been informed of my rights regarding examination, treatment, and transfer   by Dr Holt att  providers found    Benefits: Continuity of care    Risks: Potential for delay in receiving treatment      Consent for Transfer:  I acknowledge that my medical condition has been evaluated and explained to me by the emergency department physician or other qualified medical person and/or my attending physician, who has recommended that I be transferred to the service of  Accepting Physician: Dr Bertram Guillermo at [address] Name, [address] : [address]  The above potential benefits of such transfer, the potential risks associated with such transfer, and the probable risks of not being transferred have been explained to me, and I fully understand them  The doctor has explained that, in my case, the benefits of transfer outweigh the risks  I agree to be transferred  I authorize the performance of emergency medical procedures and treatments upon me in both transit and upon arrival at the receiving facility  Additionally, I authorize the release of any and all medical records to the receiving facility and request they be transported with me, if possible  I understand that the safest mode of transportation during a medical emergency is an ambulance and that the Hospital advocates the use of this mode of transport  Risks of traveling to the receiving facility by car, including absence of medical control, life sustaining equipment, such as oxygen, and medical personnel has been explained to me and I fully understand them  (LEONIE CORRECT BOX BELOW)  [  ]  I consent to the stated transfer and to be transported by ambulance/helicopter    [  ]  I consent to the stated transfer, but refuse transportation by ambulance and accept full responsibility for my transportation by car  I understand the risks of non-ambulance transfers and I exonerate the Hospital and its staff from any deterioration in my condition that results from this refusal     X___________________________________________    DATE  20  TIME________  Signature of patient or legally responsible individual signing on patient behalf           RELATIONSHIP TO PATIENT_________________________          Provider [address] [place] [address]                                         1980                              MRN [id number]    A medical screening exam was performed on the above named patient  Based on the examination:    Condition Necessitating Transfer The encounter diagnosis was Schizoaffective disorder ([address] )  Patient Condition: The patient has been stabilized such that within reasonable medical probability, no material deterioration of the patient condition or the condition of the unborn child(freddy) is likely to result from the transfer    Reason for Transfer: Level of Care needed not available at this facility    Transfer Requirements: [address]   · Space available and qualified personnel available for treatment as acknowledged by    · Agreed to accept transfer and to provide appropriate medical treatment as acknowledged by       Dr Roman Vazquez  · Appropriate medical records of the examination and treatment of the patient are provided at the time of transfer   [address], [address] _______  · Transfer will be performed by qualified personnel from    and appropriate transfer equipment as required, including the use of necessary and appropriate life support measures      Provider Certification: I have examined the patient and explained the following risks and benefits of being transferred/refusing transfer to the patient/family:  General risk, such as traffic hazards, adverse weather conditions, rough terrain or turbulence, possible failure of equipment (including vehicle or aircraft), or consequences of actions of persons outside the control of the transport personnel      Based on these reasonable risks and benefits to the patient and/or the unborn child(freddy), and based upon the information available at the time of the patients examination, I certify that the medical benefits reasonably to be expected from the provision of appropriate medical treatments at another medical facility outweigh the increasing risks, if any, to the individuals medical condition, and in the case of labor to the unborn child, from effecting the transfer      X____________________________________________ DATE 03/17/20        TIME_______      ORIGINAL - SEND TO MEDICAL RECORDS   COPY - SEND WITH PATIENT DURING TRANSFER

## 2020-03-17 NOTE — ED NOTES
Patient finished breakfast and is lying down  Agreed to having vitals taken  Complained of left leg pain       Sekou Parrish  03/17/20 9930

## 2020-03-17 NOTE — ED NOTES
Patient requested cup of coffee    Crisis worker got her a cold coffee which was decaf     Karen Gamino  03/17/20 [facility] Anaya  03/17/20 0191

## 2020-03-17 NOTE — ED NOTES
Called family guidance to ask them to reach out to telepsych and get recommendations for medications  They will call back when they hear something        Funmilayo Morris RN  03/16/20 4787

## 2020-03-17 NOTE — ED NOTES
Pt is awake and ambulatory to the restroom, pt is calm at this time       Rom Alves, COLIN  03/17/20 6615

## 2020-03-17 NOTE — ED NOTES
Pt resting in bed at this time watching TV  1:1 maintained for pt safety  No distress noted  Will continue to monitor       Jamila Soria RN  03/17/20 0882

## 2020-03-17 NOTE — ED NOTES
Pt noted to be laying in bed with eyes closed, no distress noted at this time        Brenden Romero, COLIN  03/17/20 4085

## 2020-03-17 NOTE — ED NOTES
Pt is sitting in the chair inside the room, pt's daughter remains in the room  Pt's daughter will be taken home by a family member when that family member is discharged from the ED        Galvin Fabry, RN  03/17/20 6883

## 2020-03-17 NOTE — RESTRAINT FACE TO FACE
Restraint Face to Face   [facility] 38 y o  female MRN: [id number]  Unit/Bed#: ED 02 Encounter: [id number]      Physical Evaluation: agitated  Purpose for Restraints/ Seclusion High risk for self harm, High risk for causing significant disruption of treatment environment , High risk for harm to others and high risk for flight  Patient's reaction to the intervention calm after medication and restraint    Patient's medical condition  Patient's Behavioral condition agitating and attacking staff  Restraints to be Continued

## 2020-03-17 NOTE — ED NOTES
Pt resting in bed, 1:1 at bedside     Emerald Wu, RN  03/17/20 [address] , RN  03/17/20 [address], RN  03/17/20 5636

## 2020-03-17 NOTE — ED NOTES
1:1 maintained for pt safety  Pt sleeping soundly  No apparent distress noted  Will continue to monitor       Jerry Durbin RN  03/17/20 1257

## 2020-03-17 NOTE — ED NOTES
Pt is laying quietly in the stretcher, no distress noted at this time        Preston Reynoso, COLIN  03/17/20 3563

## 2020-03-17 NOTE — ED NOTES
3/17/20 @ 0910:  Patient committed and bed search is under way  PES will continue to monitor  [address]: PES updated patient's mother, Trish Gallegos @ [phone], and mother wanted to be notified of any transfer  PES will continue to monitor  [address]: PES received call from Leighton Swann at [facility]:  Patient is accepted at [facility]  Patient is accepted by Dr Rula Lyon per Leighton Swann  Nurse report is to be called to [phone],  to ACU prior to patient transfer  PES informed ED staff    MS Keven

## 2020-03-17 NOTE — ED NOTES
1:1 maintained for pt safety  Pt sleeping soundly  No apparent distress noted  Will continue to monitor       Ana Maria Bender RN  03/17/20 4014

## 2020-03-17 NOTE — ED NOTES
According to pt she does not take abilify currently, will verify pt's meds once [facility] in [place] opens and [address] where pt sts she gets some of her psych meds  Will call at 0900       Mac Contreras RN  03/17/20 2209

## 2020-03-17 NOTE — ED NOTES
Pt standing in doorway communicating with staff  Pleasant and cooperative  No distress noted  1:1 continues for pt safety       Nick Quinn RN  03/17/20 5709

## 2020-03-17 NOTE — ED NOTES
Pt reports seeing a small boy beside this RN  States that she recognizes it is not real  Pt insightful and appropriate during interaction  Ate 100% of lunch  Denies SI at this time  States she attempted suicide over 1yr ago with method of hanging  Reports "they changed my meds after I got out of the hospital last week and I was doing good until then "  Pt pleasant and cooperative at this time  1:1 maintained for pt safety  Will continue to monitor       Renae Lopez RN  03/17/20 3833

## 2020-03-17 NOTE — ED NOTES
I walked with patient to get a drink and then returned to her room  Patient pleasant and cooperative       Alma Bond  03/17/20 [address]  03/17/20 [address]  03/17/20 7785

## 2020-03-17 NOTE — ED NOTES
Was able to verify pt's current meds from [facility] and juno Molina made aware       June Callahan, COLIN  03/17/20 0814

## 2020-03-17 NOTE — ED NOTES
Pt resting in bed at this time  No distress noted  Will continue to monitor  1:1 continues for pt safety       Hanna Tavarez RN  03/17/20 1324

## 2020-03-17 NOTE — ED NOTES
Pt resting quietly in the bed, respirations easy and unlabored        Carolina Schulz RN  03/17/20 5636

## 2020-03-17 NOTE — ED NOTES
Pt given all am meds, pt cooperative and pleasant at this time   Pt ate breakfast      Kinjal Lainez RN  03/17/20 7717

## 2020-03-17 NOTE — ED NOTES
1:1 maintained for pt safety  Pt sleeping soundly  No apparent distress noted  Will continue to monitor       Arianna Kay RN  03/17/20 4698

## 2020-03-17 NOTE — ED CARE HANDOFF
Emergency Department Sign Out Note        Sign out and transfer of care from previous provider  See Separate Emergency Department note  The patient, Beryle Ashing, was evaluated by the previous provider for suicide ideation  Workup Completed:  Medical clearance workup    ED Course / Workup Pending (followup):  Tele psychiatry evaluation                          ED Course as of Mar 17 0052   Mon Mar 16, 2020   2051 Patient was medically cleared by previous provider  Patient is currently awaiting tele psych evaluation  2252 Patient was evaluated by tele psychiatry and committed for inpatient psych placement  Patient is currently awaiting bed search  Tue Mar 17, 2020   3530 Patient became very angry and agitated  Patient was started to become violent toward staff  Restrained applied and patient given Ativan and Zyprexa IM for sedation  ECG 12 Lead Documentation Only  Date/Time: 3/17/2020 12:34 AM  Performed by: Rex Casey DO  Authorized by: Rex Casey DO     Indications / Diagnosis:  Medical clearance  ECG reviewed by me, the ED Provider: yes    Patient location:  ED  Previous ECG:     Previous ECG:  Compared to current    Similarity:  No change    Comparison to cardiac monitor: Yes    Interpretation:     Interpretation: normal    Comments:      Sinus rhythm rate 65, normal axis, normal intervals, no acute ST/T-wave as noted, otherwise unremarkable EKG, unchanged from previous study  MDM  Number of Diagnoses or Management Options  Risk of Complications, Morbidity, and/or Mortality  General comments: Patient was medically cleared by previous provider  Patient was evaluated by tele psychiatry and was committed for inpatient psych admission  Medication recommendations by psychiatrist was ordered  Towards the end of my shift patient became very agitated and angry toward staff  Patient attempted to hit 1 of the nurses    At that time patient was placed in restraint and given IM Zyprexa and Ativan for sedation  Patient is currently awaiting inpatient bed search  Care patient signed out to the next attending who will continue to monitor patient and remove restraint if patient is consolable  Patient will be observed until an inpatient psychiatric bed has become available and then transferred appropriately  Patient Progress  Patient progress: stable      Disposition  Final diagnoses:   None     ED Disposition     None      Follow-up Information    None       Patient's Medications   Discharge Prescriptions    No medications on file     No discharge procedures on file         ED Provider  Electronically Signed by     Keya Martin DO  03/17/20 8452

## 2020-03-18 NOTE — ED NOTES
Pt resting in bed at this time talking with daughter  1:1 maintained for pt safety  No distress noted  Will continue to monitor       Ana Maria Bender RN  03/17/20 2028

## 2020-03-19 LAB
ATRIAL RATE: 65 BPM
P AXIS: -4 DEGREES
PR INTERVAL: 136 MS
QRS AXIS: 69 DEGREES
QRSD INTERVAL: 78 MS
QT INTERVAL: 446 MS
QTC INTERVAL: 463 MS
T WAVE AXIS: 32 DEGREES
VENTRICULAR RATE: 65 BPM

## 2020-03-19 PROCEDURE — 93010 ELECTROCARDIOGRAM REPORT: CPT | Performed by: INTERNAL MEDICINE

## 2020-04-21 ENCOUNTER — HOSPITAL ENCOUNTER (EMERGENCY)
Facility: HOSPITAL | Age: 40
Discharge: HOME | End: 2020-04-21
Attending: EMERGENCY MEDICINE | Admitting: EMERGENCY MEDICINE
Payer: COMMERCIAL

## 2020-04-21 VITALS
SYSTOLIC BLOOD PRESSURE: 117 MMHG | BODY MASS INDEX: 34.01 KG/M2 | OXYGEN SATURATION: 99 % | WEIGHT: 180 LBS | DIASTOLIC BLOOD PRESSURE: 64 MMHG | TEMPERATURE: 97.5 F | RESPIRATION RATE: 18 BRPM | HEART RATE: 74 BPM

## 2020-04-21 DIAGNOSIS — R60.0 LOWER EXTREMITY EDEMA: Primary | ICD-10-CM

## 2020-04-21 LAB
ALBUMIN SERPL BCP-MCNC: 3.3 G/DL (ref 3.5–5)
ALP SERPL-CCNC: 110 U/L (ref 46–116)
ALT SERPL W P-5'-P-CCNC: 36 U/L (ref 12–78)
ANION GAP SERPL CALCULATED.3IONS-SCNC: 9 MMOL/L (ref 4–13)
AST SERPL W P-5'-P-CCNC: 38 U/L (ref 5–45)
BASOPHILS # BLD AUTO: 0.03 THOUSANDS/ΜL (ref 0–0.1)
BASOPHILS NFR BLD AUTO: 1 % (ref 0–1)
BILIRUB SERPL-MCNC: 0.2 MG/DL (ref 0.2–1)
BILIRUB UR QL STRIP: NEGATIVE
BUN SERPL-MCNC: 9 MG/DL (ref 5–25)
CALCIUM SERPL-MCNC: 8.9 MG/DL (ref 8.3–10.1)
CHLORIDE SERPL-SCNC: 101 MMOL/L (ref 100–108)
CLARITY UR: CLEAR
CO2 SERPL-SCNC: 29 MMOL/L (ref 21–32)
COLOR UR: YELLOW
CREAT SERPL-MCNC: 0.99 MG/DL (ref 0.6–1.3)
D DIMER PPP FEU-MCNC: 0.84 UG/ML FEU
EOSINOPHIL # BLD AUTO: 0.25 THOUSAND/ΜL (ref 0–0.61)
EOSINOPHIL NFR BLD AUTO: 6 % (ref 0–6)
ERYTHROCYTE [DISTWIDTH] IN BLOOD BY AUTOMATED COUNT: 15.9 % (ref 11.6–15.1)
GFR SERPL CREATININE-BSD FRML MDRD: 71 ML/MIN/1.73SQ M
GLUCOSE SERPL-MCNC: 184 MG/DL (ref 65–140)
GLUCOSE UR STRIP-MCNC: NEGATIVE MG/DL
HCT VFR BLD AUTO: 35.9 % (ref 34.8–46.1)
HGB BLD-MCNC: 11.4 G/DL (ref 11.5–15.4)
HGB UR QL STRIP.AUTO: NEGATIVE
IMM GRANULOCYTES # BLD AUTO: 0.02 THOUSAND/UL (ref 0–0.2)
IMM GRANULOCYTES NFR BLD AUTO: 1 % (ref 0–2)
KETONES UR STRIP-MCNC: NEGATIVE MG/DL
LEUKOCYTE ESTERASE UR QL STRIP: NEGATIVE
LYMPHOCYTES # BLD AUTO: 1.56 THOUSANDS/ΜL (ref 0.6–4.47)
LYMPHOCYTES NFR BLD AUTO: 36 % (ref 14–44)
MCH RBC QN AUTO: 29.9 PG (ref 26.8–34.3)
MCHC RBC AUTO-ENTMCNC: 31.8 G/DL (ref 31.4–37.4)
MCV RBC AUTO: 94 FL (ref 82–98)
MONOCYTES # BLD AUTO: 0.37 THOUSAND/ΜL (ref 0.17–1.22)
MONOCYTES NFR BLD AUTO: 9 % (ref 4–12)
NEUTROPHILS # BLD AUTO: 2.1 THOUSANDS/ΜL (ref 1.85–7.62)
NEUTS SEG NFR BLD AUTO: 47 % (ref 43–75)
NITRITE UR QL STRIP: NEGATIVE
NRBC BLD AUTO-RTO: 0 /100 WBCS
NT-PROBNP SERPL-MCNC: 109 PG/ML
PH UR STRIP.AUTO: 6 [PH]
PLATELET # BLD AUTO: 423 THOUSANDS/UL (ref 149–390)
PMV BLD AUTO: 8.3 FL (ref 8.9–12.7)
POTASSIUM SERPL-SCNC: 3.7 MMOL/L (ref 3.5–5.3)
PROT SERPL-MCNC: 6.9 G/DL (ref 6.4–8.2)
PROT UR STRIP-MCNC: NEGATIVE MG/DL
RBC # BLD AUTO: 3.81 MILLION/UL (ref 3.81–5.12)
SODIUM SERPL-SCNC: 139 MMOL/L (ref 136–145)
SP GR UR STRIP.AUTO: 1.02 (ref 1–1.03)
UROBILINOGEN UR QL STRIP.AUTO: 0.2 E.U./DL
WBC # BLD AUTO: 4.33 THOUSAND/UL (ref 4.31–10.16)

## 2020-04-21 PROCEDURE — 83880 ASSAY OF NATRIURETIC PEPTIDE: CPT | Performed by: EMERGENCY MEDICINE

## 2020-04-21 PROCEDURE — 80053 COMPREHEN METABOLIC PANEL: CPT | Performed by: EMERGENCY MEDICINE

## 2020-04-21 PROCEDURE — 99283 EMERGENCY DEPT VISIT LOW MDM: CPT

## 2020-04-21 PROCEDURE — 81003 URINALYSIS AUTO W/O SCOPE: CPT | Performed by: EMERGENCY MEDICINE

## 2020-04-21 PROCEDURE — 36415 COLL VENOUS BLD VENIPUNCTURE: CPT | Performed by: EMERGENCY MEDICINE

## 2020-04-21 PROCEDURE — 85379 FIBRIN DEGRADATION QUANT: CPT | Performed by: EMERGENCY MEDICINE

## 2020-04-21 PROCEDURE — 99284 EMERGENCY DEPT VISIT MOD MDM: CPT | Performed by: EMERGENCY MEDICINE

## 2020-04-21 PROCEDURE — 85025 COMPLETE CBC W/AUTO DIFF WBC: CPT | Performed by: EMERGENCY MEDICINE

## 2020-04-22 ENCOUNTER — TELEPHONE (OUTPATIENT)
Dept: MRI IMAGING | Facility: HOSPITAL | Age: 40
End: 2020-04-22

## 2020-04-27 ENCOUNTER — APPOINTMENT (EMERGENCY)
Dept: RADIOLOGY | Facility: HOSPITAL | Age: 40
End: 2020-04-27
Payer: COMMERCIAL

## 2020-04-27 ENCOUNTER — HOSPITAL ENCOUNTER (EMERGENCY)
Facility: HOSPITAL | Age: 40
Discharge: HOME | End: 2020-04-27
Attending: EMERGENCY MEDICINE | Admitting: EMERGENCY MEDICINE
Payer: COMMERCIAL

## 2020-04-27 VITALS
TEMPERATURE: 97.8 F | SYSTOLIC BLOOD PRESSURE: 100 MMHG | HEART RATE: 55 BPM | RESPIRATION RATE: 16 BRPM | OXYGEN SATURATION: 100 % | DIASTOLIC BLOOD PRESSURE: 50 MMHG

## 2020-04-27 DIAGNOSIS — J18.9 PNEUMONIA: Primary | ICD-10-CM

## 2020-04-27 LAB
ALBUMIN SERPL BCP-MCNC: 3.2 G/DL (ref 3.5–5)
ALP SERPL-CCNC: 100 U/L (ref 46–116)
ALT SERPL W P-5'-P-CCNC: 32 U/L (ref 12–78)
ANION GAP SERPL CALCULATED.3IONS-SCNC: 6 MMOL/L (ref 4–13)
AST SERPL W P-5'-P-CCNC: 37 U/L (ref 5–45)
BASOPHILS # BLD AUTO: 0.06 THOUSANDS/ΜL (ref 0–0.1)
BASOPHILS NFR BLD AUTO: 1 % (ref 0–1)
BILIRUB SERPL-MCNC: 0.2 MG/DL (ref 0.2–1)
BUN SERPL-MCNC: 10 MG/DL (ref 5–25)
CALCIUM SERPL-MCNC: 8.7 MG/DL (ref 8.3–10.1)
CHLORIDE SERPL-SCNC: 99 MMOL/L (ref 100–108)
CO2 SERPL-SCNC: 33 MMOL/L (ref 21–32)
CREAT SERPL-MCNC: 0.94 MG/DL (ref 0.6–1.3)
D DIMER PPP FEU-MCNC: 0.52 UG/ML FEU
EOSINOPHIL # BLD AUTO: 0.33 THOUSAND/ΜL (ref 0–0.61)
EOSINOPHIL NFR BLD AUTO: 6 % (ref 0–6)
ERYTHROCYTE [DISTWIDTH] IN BLOOD BY AUTOMATED COUNT: 15.4 % (ref 11.6–15.1)
GFR SERPL CREATININE-BSD FRML MDRD: 76 ML/MIN/1.73SQ M
GLUCOSE SERPL-MCNC: 104 MG/DL (ref 65–140)
HCT VFR BLD AUTO: 34.3 % (ref 34.8–46.1)
HGB BLD-MCNC: 11.1 G/DL (ref 11.5–15.4)
IMM GRANULOCYTES # BLD AUTO: 0.02 THOUSAND/UL (ref 0–0.2)
IMM GRANULOCYTES NFR BLD AUTO: 0 % (ref 0–2)
LITHIUM SERPL-SCNC: 0.2 MMOL/L (ref 0.5–1)
LYMPHOCYTES # BLD AUTO: 1.96 THOUSANDS/ΜL (ref 0.6–4.47)
LYMPHOCYTES NFR BLD AUTO: 33 % (ref 14–44)
MCH RBC QN AUTO: 30.3 PG (ref 26.8–34.3)
MCHC RBC AUTO-ENTMCNC: 32.4 G/DL (ref 31.4–37.4)
MCV RBC AUTO: 94 FL (ref 82–98)
MONOCYTES # BLD AUTO: 0.51 THOUSAND/ΜL (ref 0.17–1.22)
MONOCYTES NFR BLD AUTO: 9 % (ref 4–12)
NEUTROPHILS # BLD AUTO: 3.09 THOUSANDS/ΜL (ref 1.85–7.62)
NEUTS SEG NFR BLD AUTO: 51 % (ref 43–75)
NRBC BLD AUTO-RTO: 0 /100 WBCS
NT-PROBNP SERPL-MCNC: 358 PG/ML
PLATELET # BLD AUTO: 383 THOUSANDS/UL (ref 149–390)
PMV BLD AUTO: 8.5 FL (ref 8.9–12.7)
POTASSIUM SERPL-SCNC: 3.7 MMOL/L (ref 3.5–5.3)
PROT SERPL-MCNC: 6.9 G/DL (ref 6.4–8.2)
RBC # BLD AUTO: 3.66 MILLION/UL (ref 3.81–5.12)
SARS-COV-2 RNA RESP QL NAA+PROBE: NEGATIVE
SODIUM SERPL-SCNC: 138 MMOL/L (ref 136–145)
TROPONIN I SERPL-MCNC: <0.02 NG/ML
WBC # BLD AUTO: 5.97 THOUSAND/UL (ref 4.31–10.16)

## 2020-04-27 PROCEDURE — 36415 COLL VENOUS BLD VENIPUNCTURE: CPT | Performed by: EMERGENCY MEDICINE

## 2020-04-27 PROCEDURE — 80053 COMPREHEN METABOLIC PANEL: CPT | Performed by: EMERGENCY MEDICINE

## 2020-04-27 PROCEDURE — 93005 ELECTROCARDIOGRAM TRACING: CPT

## 2020-04-27 PROCEDURE — 80178 ASSAY OF LITHIUM: CPT | Performed by: EMERGENCY MEDICINE

## 2020-04-27 PROCEDURE — 71275 CT ANGIOGRAPHY CHEST: CPT

## 2020-04-27 PROCEDURE — 99284 EMERGENCY DEPT VISIT MOD MDM: CPT

## 2020-04-27 PROCEDURE — 85379 FIBRIN DEGRADATION QUANT: CPT | Performed by: EMERGENCY MEDICINE

## 2020-04-27 PROCEDURE — 99284 EMERGENCY DEPT VISIT MOD MDM: CPT | Performed by: EMERGENCY MEDICINE

## 2020-04-27 PROCEDURE — 83880 ASSAY OF NATRIURETIC PEPTIDE: CPT | Performed by: EMERGENCY MEDICINE

## 2020-04-27 PROCEDURE — 84484 ASSAY OF TROPONIN QUANT: CPT | Performed by: EMERGENCY MEDICINE

## 2020-04-27 PROCEDURE — 87635 SARS-COV-2 COVID-19 AMP PRB: CPT | Performed by: EMERGENCY MEDICINE

## 2020-04-27 PROCEDURE — 85025 COMPLETE CBC W/AUTO DIFF WBC: CPT | Performed by: EMERGENCY MEDICINE

## 2020-04-27 RX ORDER — OMEPRAZOLE 40 MG/1
40 CAPSULE, DELAYED RELEASE ORAL DAILY
COMMUNITY

## 2020-04-27 RX ORDER — DOXYCYCLINE HYCLATE 100 MG/1
100 CAPSULE ORAL 2 TIMES DAILY
Qty: 14 CAPSULE | Refills: 0 | Status: SHIPPED | OUTPATIENT
Start: 2020-04-27 | End: 2020-05-04

## 2020-04-27 RX ORDER — CYCLOBENZAPRINE HCL 10 MG
10 TABLET ORAL AS NEEDED
COMMUNITY
End: 2021-05-01 | Stop reason: HOSPADM

## 2020-04-27 RX ORDER — FUROSEMIDE 20 MG/1
20 TABLET ORAL DAILY
COMMUNITY

## 2020-04-27 RX ORDER — NICOTINE 21 MG/24HR
1 PATCH, TRANSDERMAL 24 HOURS TRANSDERMAL EVERY 24 HOURS
COMMUNITY

## 2020-04-27 RX ORDER — VITAMIN B COMPLEX
1 CAPSULE ORAL DAILY
COMMUNITY

## 2020-04-27 RX ORDER — BUSPIRONE HYDROCHLORIDE 15 MG/1
20 TABLET ORAL 3 TIMES DAILY
COMMUNITY

## 2020-04-27 RX ORDER — BUPRENORPHINE 2 MG/1
8 TABLET SUBLINGUAL 2 TIMES DAILY
COMMUNITY

## 2020-04-27 RX ORDER — OLANZAPINE 2.5 MG/1
2.5 TABLET ORAL
COMMUNITY

## 2020-04-27 RX ORDER — LITHIUM CARBONATE 150 MG/1
450 CAPSULE ORAL
COMMUNITY

## 2020-04-27 RX ORDER — DOXYCYCLINE HYCLATE 100 MG/1
100 CAPSULE ORAL ONCE
Status: COMPLETED | OUTPATIENT
Start: 2020-04-27 | End: 2020-04-27

## 2020-04-27 RX ADMIN — DOXYCYCLINE 100 MG: 100 CAPSULE ORAL at 19:39

## 2020-04-27 RX ADMIN — IOHEXOL 85 ML: 350 INJECTION, SOLUTION INTRAVENOUS at 16:55

## 2020-04-29 LAB
ATRIAL RATE: 49 BPM
P AXIS: 43 DEGREES
PR INTERVAL: 164 MS
QRS AXIS: 67 DEGREES
QRSD INTERVAL: 78 MS
QT INTERVAL: 478 MS
QTC INTERVAL: 431 MS
T WAVE AXIS: 41 DEGREES
VENTRICULAR RATE: 49 BPM

## 2020-04-29 PROCEDURE — 93010 ELECTROCARDIOGRAM REPORT: CPT | Performed by: INTERNAL MEDICINE

## 2020-09-09 ENCOUNTER — APPOINTMENT (EMERGENCY)
Dept: RADIOLOGY | Facility: HOSPITAL | Age: 40
End: 2020-09-09
Payer: COMMERCIAL

## 2020-09-09 ENCOUNTER — HOSPITAL ENCOUNTER (EMERGENCY)
Facility: HOSPITAL | Age: 40
Discharge: HOME | End: 2020-09-09
Attending: EMERGENCY MEDICINE | Admitting: EMERGENCY MEDICINE
Payer: COMMERCIAL

## 2020-09-09 VITALS
OXYGEN SATURATION: 99 % | DIASTOLIC BLOOD PRESSURE: 58 MMHG | SYSTOLIC BLOOD PRESSURE: 123 MMHG | HEART RATE: 60 BPM | BODY MASS INDEX: 31.12 KG/M2 | TEMPERATURE: 98.7 F | WEIGHT: 164.68 LBS | RESPIRATION RATE: 16 BRPM

## 2020-09-09 DIAGNOSIS — S86.819A STRAIN OF CALF MUSCLE, INITIAL ENCOUNTER: ICD-10-CM

## 2020-09-09 DIAGNOSIS — M79.89 LEG SWELLING: Primary | ICD-10-CM

## 2020-09-09 LAB
EXT PREG TEST URINE: NEGATIVE
EXT. CONTROL ED NAV: NORMAL

## 2020-09-09 PROCEDURE — 93971 EXTREMITY STUDY: CPT | Performed by: SURGERY

## 2020-09-09 PROCEDURE — 99284 EMERGENCY DEPT VISIT MOD MDM: CPT

## 2020-09-09 PROCEDURE — 81025 URINE PREGNANCY TEST: CPT | Performed by: EMERGENCY MEDICINE

## 2020-09-09 PROCEDURE — 93971 EXTREMITY STUDY: CPT

## 2020-09-09 PROCEDURE — 99282 EMERGENCY DEPT VISIT SF MDM: CPT | Performed by: EMERGENCY MEDICINE

## 2020-09-09 NOTE — ED PROVIDER NOTES
History  Chief Complaint   Patient presents with    Leg Pain     C/O pain to L calf  Patient reports she got a sharp pain when she went to run across the road states it has been hurting since  HPI     76-year-old female presents to the emergency department for evaluation of left leg pain and swelling  Patient states had pain roughly 1 week ago that she thought was a cramp  She thought this is improving but she went to run across the road and stated this worsens the pain  She denies initial areas of pain or injury  Has been able to bear weight  Denies any chest pain, shortness of breath  Denies taking any birth control supplements  Reports possible family history of blood clots  Prior to Admission Medications   Prescriptions Last Dose Informant Patient Reported? Taking?    Ergocalciferol (VITAMIN D2 PO)   Yes No   Sig: Take 50,000 Units by mouth once a week   FLUoxetine (PROzac) 40 MG capsule  Self Yes No   Sig: Take 80 mg by mouth daily    LORazepam (ATIVAN) 0 5 mg tablet  Self Yes No   Sig: Take 1 mg by mouth every 8 (eight) hours as needed for anxiety    Melatonin-Pyridoxine (MELATIN PO)   Yes No   Sig: Take 3 mg by mouth daily at bedtime   OLANZapine (ZyPREXA) 2 5 mg tablet   Yes No   Sig: Take 2 5 mg by mouth daily at bedtime   b complex vitamins capsule   Yes No   Sig: Take 1 capsule by mouth daily   buprenorphine (SUBUTEX) 2 mg   Yes No   Sig: Place 8 mg under the tongue 2 (two) times a day   busPIRone (BUSPAR) 15 mg tablet   Yes No   Sig: Take 15 mg by mouth 3 (three) times a day   cyclobenzaprine (FLEXERIL) 10 mg tablet   Yes No   Sig: Take 10 mg by mouth 2 (two) times a day   furosemide (LASIX) 20 mg tablet   Yes No   Sig: Take 20 mg by mouth daily   gabapentin (NEURONTIN) 800 mg tablet  Self Yes No   Sig: Take 1,200 mg by mouth 3 (three) times a day    lithium carbonate 150 mg capsule   Yes No   Sig: Take 450 mg by mouth daily at bedtime   nicotine (NICODERM CQ) 21 mg/24 hr TD 24 hr patch Yes No   Sig: Place 1 patch on the skin every 24 hours   omeprazole (PriLOSEC) 40 MG capsule   Yes No   Sig: Take 40 mg by mouth daily   propranolol (INDERAL) 40 mg tablet   Yes No   Sig: Take 40 mg by mouth 2 (two) times a day      Facility-Administered Medications: None       Past Medical History:   Diagnosis Date    Anemia     Anxiety     Chronic left-sided low back pain with left-sided sciatica 2020    Chronic pain     Chronic pain syndrome 2020    Depression     Diabetes ([facility] )     Diabetes mellitus ([facility] )     no meds    Psychiatric disorder     Vegetarian        Past Surgical History:   Procedure Laterality Date    ANTERIOR / POSTERIOR COMBINED FUSION THORACIC SPINE      BACK SURGERY       SECTION      GASTRIC BYPASS      JORGE-EN-Y PROCEDURE      2012    SALPINGECTOMY      TUBAL LIGATION         Family History   Problem Relation Age of Onset    No Known Problems Mother     No Known Problems Father     Kidney disease Maternal Grandfather      I have reviewed and agree with the history as documented  E-Cigarette/Vaping    E-Cigarette Use Former User      E-Cigarette/Vaping Substances    Nicotine No     THC No     CBD No     Flavoring No     Other No     Unknown No      Social History     Tobacco Use    Smoking status: Current Every Day Smoker     Packs/day: 0 50     Types: Cigarettes    Smokeless tobacco: Never Used   Substance Use Topics    Alcohol use: Not Currently     Comment: occasionally    Drug use: Yes     Types: Marijuana     Comment: med  [address]  for pain       Review of Systems   Musculoskeletal:        Left calf pain, swelling   All other systems reviewed and are negative  Physical Exam  Physical Exam  Vitals signs and nursing note reviewed  Constitutional:       General: She is not in acute distress  Appearance: She is well-developed  HENT:      Head: Normocephalic and atraumatic     Eyes:      Pupils: Pupils are equal, round, and reactive to light  Neck:      Musculoskeletal: Normal range of motion and neck supple  Cardiovascular:      Rate and Rhythm: Normal rate and regular rhythm  Heart sounds: Normal heart sounds  No murmur  Pulmonary:      Effort: Pulmonary effort is normal  No respiratory distress  Breath sounds: Normal breath sounds  No wheezing or rales  Abdominal:      General: Bowel sounds are normal  There is no distension  Palpations: Abdomen is soft  Tenderness: There is no abdominal tenderness  There is no guarding or rebound  Musculoskeletal: Normal range of motion  General: Swelling and tenderness present  No deformity  Comments: Tenderness and swelling to left calf  Intact Achilles tendon  Intact quadriceps tendon  Lymphadenopathy:      Cervical: No cervical adenopathy  Skin:     Capillary Refill: Capillary refill takes less than 2 seconds  Findings: No erythema or rash  Neurological:      Mental Status: She is alert and oriented to person, place, and time  Cranial Nerves: No cranial nerve deficit  Motor: No abnormal muscle tone        Coordination: Coordination normal    Psychiatric:         Behavior: Behavior normal          Vital Signs  ED Triage Vitals   Temperature Pulse Respirations Blood Pressure SpO2   09/09/20 1100 09/09/20 1053 09/09/20 1053 09/09/20 1053 09/09/20 1053   98 7 °F (37 1 °C) 61 16 123/58 100 %      Temp Source Heart Rate Source Patient Position - Orthostatic VS BP Location FiO2 (%)   09/09/20 1100 09/09/20 1053 09/09/20 1053 09/09/20 1053 --   Tympanic Monitor Lying Left arm       Pain Score       09/09/20 1053       7           Vitals:    09/09/20 1053 09/09/20 1100   BP: 123/58 123/58   Pulse: 61 60   Patient Position - Orthostatic VS: Lying          Visual Acuity      ED Medications  Medications - No data to display    Diagnostic Studies  Results Reviewed     Procedure Component Value Units Date/Time    POCT pregnancy, urine [[id number]] (Normal) Resulted:  09/09/20 1119    Lab Status:  Final result Updated:  09/09/20 1119     EXT PREG TEST UR (Ref: Negative) negative     Control valid                 VAS lower limb venous duplex study, unilateral/limited    (Results Pending)              Procedures  Procedures         ED Course                                       MDM  Number of Diagnoses or Management Options  Leg swelling: new and requires workup  Strain of calf muscle, initial encounter: new and requires workup  Diagnosis management comments: Left calf pain and swelling  No history of DVT  She does smoke and has a significant family history  Will perform DVT study  No falls or trauma  No bony tenderness  Intact Achilles tendon  No x-ray imaging indicated  Ultrasound with no signs of DVT  No bony tenderness  Suspect calf strain  Rice therapy recommended  Crutches per patient request          Amount and/or Complexity of Data Reviewed  Tests in the radiology section of CPT®: reviewed and ordered    Risk of Complications, Morbidity, and/or Mortality  Presenting problems: moderate  Diagnostic procedures: moderate  Management options: moderate    Patient Progress  Patient progress: stable      Disposition  Final diagnoses:   Leg swelling   Strain of calf muscle, initial encounter     Time reflects when diagnosis was documented in both MDM as applicable and the Disposition within this note     Time User Action Codes Description Comment    9/9/2020 12:32 PM Velasquez Maldonado Add [M79 89] Leg swelling     9/9/2020 12:32 PM Velasquez Maldonado Add [D94 048Q] Strain of calf muscle, initial encounter       ED Disposition     ED Disposition Condition Date/Time Comment    Discharge Stable Wed Sep 9, 2020 12:32 PM [facility] discharge to home/self care              Follow-up Information     Follow up With Specialties Details Why Contact Info Additional Information    Vianey Edwards MD  Schedule an appointment as soon as possible for a visit in 2 days [facility] [address] [facility]  [phone] [facility] Emergency Department Emergency Medicine Go to  If symptoms worsen [facility]  [phone] [facility], [address], [facility]          Patient's Medications   Discharge Prescriptions    No medications on file     No discharge procedures on file      PDMP Review     None          ED Provider  Electronically Signed by           Donovan Sun MD  09/09/20 3400

## 2021-04-26 ENCOUNTER — HOSPITAL ENCOUNTER (EMERGENCY)
Facility: HOSPITAL | Age: 41
Discharge: HOME | End: 2021-04-27
Attending: EMERGENCY MEDICINE
Payer: COMMERCIAL

## 2021-04-26 ENCOUNTER — APPOINTMENT (EMERGENCY)
Dept: RADIOLOGY | Facility: HOSPITAL | Age: 41
End: 2021-04-26
Payer: COMMERCIAL

## 2021-04-26 VITALS
TEMPERATURE: 97.6 F | RESPIRATION RATE: 24 BRPM | DIASTOLIC BLOOD PRESSURE: 67 MMHG | HEART RATE: 88 BPM | SYSTOLIC BLOOD PRESSURE: 120 MMHG | OXYGEN SATURATION: 97 %

## 2021-04-26 DIAGNOSIS — S09.90XA HEAD INJURY: ICD-10-CM

## 2021-04-26 DIAGNOSIS — M62.838 MUSCLE SPASM: ICD-10-CM

## 2021-04-26 DIAGNOSIS — S00.81XA FOREHEAD ABRASION: Primary | ICD-10-CM
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
## 2021-04-26 DIAGNOSIS — S16.1XXA NECK STRAIN: ICD-10-CM

## 2021-04-26 PROCEDURE — 90471 IMMUNIZATION ADMIN: CPT

## 2021-04-26 PROCEDURE — 70450 CT HEAD/BRAIN W/O DYE: CPT

## 2021-04-26 PROCEDURE — 72125 CT NECK SPINE W/O DYE: CPT

## 2021-04-26 PROCEDURE — 99284 EMERGENCY DEPT VISIT MOD MDM: CPT

## 2021-04-26 PROCEDURE — G1004 CDSM NDSC: HCPCS

## 2021-04-27 PROCEDURE — 99284 EMERGENCY DEPT VISIT MOD MDM: CPT | Performed by: EMERGENCY MEDICINE

## 2021-04-27 PROCEDURE — 90715 TDAP VACCINE 7 YRS/> IM: CPT | Performed by: EMERGENCY MEDICINE

## 2021-04-27 RX ORDER — CYCLOBENZAPRINE HCL 10 MG
10 TABLET ORAL ONCE
Status: COMPLETED | OUTPATIENT
Start: 2021-04-27 | End: 2021-04-27

## 2021-04-27 RX ORDER — TRAMADOL HYDROCHLORIDE 50 MG/1
50 TABLET ORAL ONCE
Status: COMPLETED | OUTPATIENT
Start: 2021-04-27 | End: 2021-04-27

## 2021-04-27 RX ADMIN — CYCLOBENZAPRINE HYDROCHLORIDE 10 MG: 10 TABLET, FILM COATED ORAL at 00:38

## 2021-04-27 RX ADMIN — TETANUS TOXOID, REDUCED DIPHTHERIA TOXOID AND ACELLULAR PERTUSSIS VACCINE, ADSORBED 0.5 ML: 5; 2.5; 8; 8; 2.5 SUSPENSION INTRAMUSCULAR at 00:38

## 2021-04-27 RX ADMIN — TRAMADOL HYDROCHLORIDE 50 MG: 50 TABLET, FILM COATED ORAL at 00:37

## 2021-04-28 NOTE — ED PROVIDER NOTES
History  Chief Complaint   Patient presents with   Nino Mode     states about 30 min ago slipped down some crumbling cement steps c/o that she hit her head, may have blacked out for a second c/o some neck pain as well  hard collar placed in triage, also cut noted to L hand       History provided by:  Patient   used: No    Fall  Mechanism of injury: fall    Injury location:  Head/neck, face and hand  Head/neck injury location:  Head  Facial injury location:  Forehead  Hand injury location:  Dorsum of L hand and dorsum of R hand  Time since incident:  30 minutes  Arrived directly from scene: yes    Fall:     Fall occurred:  Down stairs    Height of fall: Three steps    Impact surface:  [place]    Point of impact:  Head, face and hands    Entrapped after fall: no    Protective equipment: none    Suspicion of alcohol use: no    Suspicion of drug use: yes (vs Rx med effect)    Tetanus status:  Out of date  Prior to arrival data:     Bystander interventions:  None    Patient ambulatory at scene: yes      Blood loss:  None    Responsiveness at scene:  Alert    Orientation at scene:  Person, place, situation and time    Loss of consciousness: Unknown  Amnesic to event: no      Airway interventions:  None    Breathing interventions:  None    IV access status:  None    IO access:  None    Fluids administered:  None    Cardiac interventions:  None    Medications administered:  None    Immobilization:  None  Associated symptoms: headaches and neck pain    Associated symptoms: no abdominal pain, no back pain, no chest pain, no difficulty breathing, no nausea, no seizures and no vomiting    Risk factors: diabetes    Risk factors: no anticoagulation therapy and not pregnant    Risk factors comment:  Psych disorder, polypharmacy      Prior to Admission Medications   Prescriptions Last Dose Informant Patient Reported? Taking?    Ergocalciferol (VITAMIN D2 PO)   Yes No   Sig: Take 50,000 Units by mouth once a week   FLUoxetine (PROzac) 40 MG capsule Not Taking at Unknown time Self Yes No   Sig: Take 80 mg by mouth daily    LORazepam (ATIVAN) 0 5 mg tablet  Self Yes No   Sig: Take 1 mg by mouth every 8 (eight) hours as needed for anxiety    Melatonin-Pyridoxine (MELATIN PO)   Yes No   Sig: Take 3 mg by mouth daily at bedtime   OLANZapine (ZyPREXA) 2 5 mg tablet Not Taking at Unknown time  Yes No   Sig: Take 2 5 mg by mouth daily at bedtime   b complex vitamins capsule   Yes No   Sig: Take 1 capsule by mouth daily   buprenorphine (SUBUTEX) 2 mg Not Taking at Unknown time  Yes No   Sig: Place 8 mg under the tongue 2 (two) times a day   busPIRone (BUSPAR) 15 mg tablet   Yes No   Sig: Take 20 mg by mouth 3 (three) times a day    cyclobenzaprine (FLEXERIL) 10 mg tablet   Yes No   Sig: Take 10 mg by mouth as needed    furosemide (LASIX) 20 mg tablet Not Taking at Unknown time  Yes No   Sig: Take 20 mg by mouth daily   gabapentin (NEURONTIN) 800 mg tablet  Self Yes No   Sig: Take 1,200 mg by mouth 3 (three) times a day    lithium carbonate 150 mg capsule Not Taking at Unknown time  Yes No   Sig: Take 450 mg by mouth daily at bedtime   nicotine (NICODERM CQ) 21 mg/24 hr TD 24 hr patch Not Taking at Unknown time  Yes No   Sig: Place 1 patch on the skin every 24 hours   omeprazole (PriLOSEC) 40 MG capsule   Yes No   Sig: Take 40 mg by mouth daily   propranolol (INDERAL) 40 mg tablet   Yes No   Sig: Take 40 mg by mouth 2 (two) times a day      Facility-Administered Medications: None       Past Medical History:   Diagnosis Date    Anemia     Anxiety     Chronic left-sided low back pain with left-sided sciatica 2/12/2020    Chronic pain     Chronic pain syndrome 2/12/2020    Depression     Diabetes ([facility] [address]     Diabetes mellitus (HCC)     no meds    Psychiatric disorder     Vegetarian        Past Surgical History:   Procedure Laterality Date    ANTERIOR / POSTERIOR COMBINED FUSION THORACIC SPINE      BACK SURGERY       SECTION      GASTRIC BYPASS      JORGE-EN-Y PROCEDURE      2012    SALPINGECTOMY      TUBAL LIGATION         Family History   Problem Relation Age of Onset    No Known Problems Mother     No Known Problems Father     Kidney disease Maternal Grandfather      I have reviewed and agree with the history as documented  E-Cigarette/Vaping    E-Cigarette Use Current Some Day User      E-Cigarette/Vaping Substances    Nicotine No     THC No     CBD No     Flavoring No     Other No     Unknown No      Social History     Tobacco Use    Smoking status: Former Smoker     Packs/day: 0 50     Types: Cigarettes    Smokeless tobacco: Never Used   Substance Use Topics    Alcohol use: Not Currently     Comment: occasionally    Drug use: Yes     Types: Marijuana     Comment: med  Janett Celeste  for pain       Review of Systems   Constitutional: Negative for chills, diaphoresis, fatigue and fever  HENT: Negative for dental problem, ear pain, facial swelling, nosebleeds, sinus pressure, trouble swallowing and voice change  Pain/scrape to left forehead   Eyes: Negative for photophobia, pain and visual disturbance  Respiratory: Negative for cough, chest tightness and shortness of breath  Cardiovascular: Negative for chest pain and palpitations  Gastrointestinal: Negative for abdominal distention, abdominal pain, diarrhea, nausea and vomiting  Genitourinary: Negative for difficulty urinating, dysuria, hematuria and menstrual problem  Musculoskeletal: Positive for neck pain  Negative for arthralgias, back pain and neck stiffness  Skin: Positive for wound  Negative for color change, pallor and rash  Left forehead bruise/scrape  Script to hands bilaterally   Allergic/Immunologic: Negative for immunocompromised state  Neurological: Positive for headaches  Negative for dizziness, seizures, syncope, facial asymmetry, speech difficulty, weakness, light-headedness and numbness  Psychiatric/Behavioral: The patient is nervous/anxious  All other systems reviewed and are negative  Physical Exam  Physical Exam  Vitals signs and nursing note reviewed  Constitutional:       General: She is not in acute distress  Appearance: Normal appearance  She is well-developed and normal weight  HENT:      Head: Normocephalic and atraumatic  Right Ear: Tympanic membrane, ear canal and external ear normal       Left Ear: Tympanic membrane, ear canal and external ear normal       Ears:      Comments: No otorrhea  No dubois sign     Nose: Nose normal       Comments: No nasorrhea     Mouth/Throat:      Mouth: Mucous membranes are moist    Eyes:      Extraocular Movements: Extraocular movements intact  Conjunctiva/sclera: Conjunctivae normal       Pupils: Pupils are equal, round, and reactive to light  Comments: 4 mm pupils  No raccoon eyes   Neck:      Musculoskeletal: Normal range of motion  Muscular tenderness present  No neck rigidity  Comments: Diffuse tenderness palpation, no step-offs, no crepitus, no open wounds  C-collar placed   Cardiovascular:      Rate and Rhythm: Normal rate and regular rhythm  Heart sounds: No murmur  Pulmonary:      Effort: Pulmonary effort is normal       Breath sounds: Normal breath sounds  Abdominal:      Palpations: Abdomen is soft  Tenderness: There is no abdominal tenderness  Musculoskeletal: Normal range of motion  General: No swelling, tenderness, deformity or signs of injury  Right lower leg: No edema  Left lower leg: No edema  Skin:     General: Skin is warm and dry  Capillary Refill: Capillary refill takes less than 2 seconds  Findings: Lesion present  Comments: Multiple superficial abrasions to the dorsal aspect of hands, left more than right   Neurological:      General: No focal deficit present  Mental Status: She is alert and oriented to person, place, and time  Psychiatric:      Comments: Moderately anxious         Vital Signs  ED Triage Vitals [04/26/21 2234]   Temperature Pulse Respirations Blood Pressure SpO2   97 6 °F (36 4 °C) 88 (!) 24 120/67 97 %      Temp Source Heart Rate Source Patient Position - Orthostatic VS BP Location FiO2 (%)   Tympanic Monitor Sitting Left arm --      Pain Score       Worst Possible Pain           Vitals:    04/26/21 2234   BP: 120/67   Pulse: 88   Patient Position - Orthostatic VS: Sitting         Visual Acuity      ED Medications  Medications   tetanus-diphtheria-acellular pertussis (BOOSTRIX) IM injection 0 5 mL (0 5 mL Intramuscular Given 4/27/21 0038)   traMADol (ULTRAM) tablet 50 mg (50 mg Oral Given 4/27/21 0037)   cyclobenzaprine (FLEXERIL) tablet 10 mg (10 mg Oral Given 4/27/21 0038)       Diagnostic Studies  Results Reviewed     None                 CT head without contrast   Final Result by Shawna Bergman MD (04/27 0002)      No acute intracranial abnormality  Workstation performed: [id number]         CT spine cervical without contrast   Final Result by Shawna Bergman MD (04/27 0008)      No cervical spine fracture or traumatic malalignment                     Workstation performed: [id number]                    Procedures  Procedures         ED Course                                           MDM  Number of Diagnoses or Management Options  Fall: minor  Forehead abrasion: new and requires workup  Head injury: new and requires workup  Muscle spasm: new and requires workup  Neck strain: new and requires workup     Amount and/or Complexity of Data Reviewed  Tests in the radiology section of CPT®: ordered and reviewed  Review and summarize past medical records: yes    Risk of Complications, Morbidity, and/or Mortality  Presenting problems: low  Diagnostic procedures: low  Management options: low    Patient Progress  Patient progress: stable      Disposition  Final diagnoses:   Forehead abrasion   Neck strain Head injury   Fall   Muscle spasm     Time reflects when diagnosis was documented in both MDM as applicable and the Disposition within this note     Time User Action Codes Description Comment    4/27/2021 12:28 AM Brooksie Skiff Add [S00 81XA] Forehead abrasion     4/27/2021 12:28 AM Brooksie Skiff Add [S16  1XXA] Neck strain     4/27/2021 12:28 AM Brooksie Skiff Add [N75 26ZA] Head injury     4/27/2021 12:28 AM Brooksie Skiff Add [C12  XXXA] Fall     4/27/2021 12:29 AM Brooksie Skiff Add [I51 406] Muscle spasm       ED Disposition     ED Disposition Condition Date/Time Comment    Discharge Stable Tue Apr 27, 2021 12:27 AM [facility] discharge to home/self care              Follow-up Information     Follow up With Specialties Details Why Contact Info    Timm Schaumann, MD  Schedule an appointment as soon as possible for a visit in 2 days  [address] [place] [address] [facility]  [phone]            Discharge Medication List as of 4/27/2021 12:30 AM      CONTINUE these medications which have NOT CHANGED    Details   b complex vitamins capsule Take 1 capsule by mouth daily, Historical Med      buprenorphine (SUBUTEX) 2 mg Place 8 mg under the tongue 2 (two) times a day, Historical Med      busPIRone (BUSPAR) 15 mg tablet Take 20 mg by mouth 3 (three) times a day , Historical Med      cyclobenzaprine (FLEXERIL) 10 mg tablet Take 10 mg by mouth as needed , Historical Med      Ergocalciferol (VITAMIN D2 PO) Take 50,000 Units by mouth once a week, Historical Med      FLUoxetine (PROzac) 40 MG capsule Take 80 mg by mouth daily , Historical Med      furosemide (LASIX) 20 mg tablet Take 20 mg by mouth daily, Historical Med      gabapentin (NEURONTIN) 800 mg tablet Take 1,200 mg by mouth 3 (three) times a day , Historical Med      lithium carbonate 150 mg capsule Take 450 mg by mouth daily at bedtime, Historical Med      LORazepam (ATIVAN) 0 5 mg tablet Take 1 mg by mouth every 8 (eight) hours as needed for anxiety , Historical Med      Melatonin-Pyridoxine (MELATIN PO) Take 3 mg by mouth daily at bedtime, Historical Med      nicotine (NICODERM CQ) 21 mg/24 hr TD 24 hr patch Place 1 patch on the skin every 24 hours, Historical Med      OLANZapine (ZyPREXA) 2 5 mg tablet Take 2 5 mg by mouth daily at bedtime, Historical Med      omeprazole (PriLOSEC) 40 MG capsule Take 40 mg by mouth daily, Historical Med      propranolol (INDERAL) 40 mg tablet Take 40 mg by mouth 2 (two) times a day, Historical Med           No discharge procedures on file      PDMP Review     None          ED Provider  Electronically Signed by           Juana Noble MD  04/28/21 237 Gutierrez Sauer MD  04/28/21 1573

## 2021-04-30 ENCOUNTER — APPOINTMENT (EMERGENCY)
Dept: RADIOLOGY | Facility: HOSPITAL | Age: 41
End: 2021-04-30
Payer: COMMERCIAL

## 2021-04-30 ENCOUNTER — HOSPITAL ENCOUNTER (EMERGENCY)
Facility: HOSPITAL | Age: 41
End: 2021-04-30
Attending: EMERGENCY MEDICINE | Admitting: EMERGENCY MEDICINE
Payer: COMMERCIAL

## 2021-04-30 ENCOUNTER — HOSPITAL ENCOUNTER (OUTPATIENT)
Facility: HOSPITAL | Age: 41
Setting detail: OBSERVATION
Discharge: HOME | End: 2021-05-01
Attending: SURGERY | Admitting: SURGERY
Payer: COMMERCIAL

## 2021-04-30 VITALS
HEIGHT: 61 IN | DIASTOLIC BLOOD PRESSURE: 62 MMHG | OXYGEN SATURATION: 98 % | TEMPERATURE: 96.5 F | RESPIRATION RATE: 16 BRPM | HEART RATE: 46 BPM | BODY MASS INDEX: 24.55 KG/M2 | SYSTOLIC BLOOD PRESSURE: 117 MMHG | WEIGHT: 130 LBS

## 2021-04-30 DIAGNOSIS — G93.6 CEREBRAL EDEMA (HCC): ICD-10-CM

## 2021-04-30 DIAGNOSIS — S06.309A TRAUMATIC INTRACRANIAL HEMORRHAGE (HCC): Primary | ICD-10-CM

## 2021-04-30 DIAGNOSIS — T07.XXXA MULTIPLE CONTUSIONS: Primary | ICD-10-CM

## 2021-04-30 PROBLEM — S06.339A CEREBRAL CONTUSION (HCC): Status: ACTIVE | Noted: 2021-04-30

## 2021-04-30 PROBLEM — R20.0 LEFT LEG NUMBNESS: Status: ACTIVE | Noted: 2021-04-30

## 2021-04-30 PROBLEM — M54.2 NECK PAIN: Status: ACTIVE | Noted: 2021-04-30

## 2021-04-30 PROBLEM — S06.33AA CEREBRAL CONTUSION: Status: ACTIVE | Noted: 2021-04-30

## 2021-04-30 LAB
ALBUMIN SERPL BCP-MCNC: 3 G/DL (ref 3.5–5)
ALP SERPL-CCNC: 77 U/L (ref 46–116)
ALT SERPL W P-5'-P-CCNC: 29 U/L (ref 12–78)
ANION GAP SERPL CALCULATED.3IONS-SCNC: 10 MMOL/L (ref 4–13)
APTT PPP: 27 SECONDS (ref 23–37)
AST SERPL W P-5'-P-CCNC: 19 U/L (ref 5–45)
BASOPHILS # BLD AUTO: 0.03 THOUSANDS/ΜL (ref 0–0.1)
BASOPHILS NFR BLD AUTO: 1 % (ref 0–1)
BILIRUB SERPL-MCNC: 0.23 MG/DL (ref 0.2–1)
BUN SERPL-MCNC: 7 MG/DL (ref 5–25)
CALCIUM ALBUM COR SERPL-MCNC: 9 MG/DL (ref 8.3–10.1)
CALCIUM SERPL-MCNC: 8.2 MG/DL (ref 8.3–10.1)
CHLORIDE SERPL-SCNC: 107 MMOL/L (ref 100–108)
CO2 SERPL-SCNC: 24 MMOL/L (ref 21–32)
CREAT SERPL-MCNC: 0.62 MG/DL (ref 0.6–1.3)
EOSINOPHIL # BLD AUTO: 0.07 THOUSAND/ΜL (ref 0–0.61)
EOSINOPHIL NFR BLD AUTO: 1 % (ref 0–6)
ERYTHROCYTE [DISTWIDTH] IN BLOOD BY AUTOMATED COUNT: 13.3 % (ref 11.6–15.1)
GFR SERPL CREATININE-BSD FRML MDRD: 112 ML/MIN/1.73SQ M
GLUCOSE SERPL-MCNC: 127 MG/DL (ref 65–140)
HCT VFR BLD AUTO: 32.3 % (ref 34.8–46.1)
HGB BLD-MCNC: 10.5 G/DL (ref 11.5–15.4)
IMM GRANULOCYTES # BLD AUTO: 0.01 THOUSAND/UL (ref 0–0.2)
IMM GRANULOCYTES NFR BLD AUTO: 0 % (ref 0–2)
INR PPP: 0.92 (ref 0.84–1.19)
LYMPHOCYTES # BLD AUTO: 0.92 THOUSANDS/ΜL (ref 0.6–4.47)
LYMPHOCYTES NFR BLD AUTO: 15 % (ref 14–44)
MCH RBC QN AUTO: 29.9 PG (ref 26.8–34.3)
MCHC RBC AUTO-ENTMCNC: 32.5 G/DL (ref 31.4–37.4)
MCV RBC AUTO: 92 FL (ref 82–98)
MONOCYTES # BLD AUTO: 0.21 THOUSAND/ΜL (ref 0.17–1.22)
MONOCYTES NFR BLD AUTO: 3 % (ref 4–12)
NEUTROPHILS # BLD AUTO: 4.86 THOUSANDS/ΜL (ref 1.85–7.62)
NEUTS SEG NFR BLD AUTO: 80 % (ref 43–75)
NRBC BLD AUTO-RTO: 0 /100 WBCS
PLATELET # BLD AUTO: 379 THOUSANDS/UL (ref 149–390)
PMV BLD AUTO: 9.2 FL (ref 8.9–12.7)
POTASSIUM SERPL-SCNC: 3.7 MMOL/L (ref 3.5–5.3)
PROT SERPL-MCNC: 6.3 G/DL (ref 6.4–8.2)
PROTHROMBIN TIME: 12.3 SECONDS (ref 11.6–14.5)
RBC # BLD AUTO: 3.51 MILLION/UL (ref 3.81–5.12)
SODIUM SERPL-SCNC: 141 MMOL/L (ref 136–145)
WBC # BLD AUTO: 6.1 THOUSAND/UL (ref 4.31–10.16)

## 2021-04-30 PROCEDURE — 85025 COMPLETE CBC W/AUTO DIFF WBC: CPT | Performed by: EMERGENCY MEDICINE

## 2021-04-30 PROCEDURE — 99219 PR INITIAL OBSERVATION CARE/DAY 50 MINUTES: CPT | Performed by: SURGERY

## 2021-04-30 PROCEDURE — 99205 OFFICE O/P NEW HI 60 MIN: CPT | Performed by: PHYSICIAN ASSISTANT

## 2021-04-30 PROCEDURE — 80053 COMPREHEN METABOLIC PANEL: CPT | Performed by: EMERGENCY MEDICINE

## 2021-04-30 PROCEDURE — G1004 CDSM NDSC: HCPCS

## 2021-04-30 PROCEDURE — 99284 EMERGENCY DEPT VISIT MOD MDM: CPT

## 2021-04-30 PROCEDURE — 96374 THER/PROPH/DIAG INJ IV PUSH: CPT

## 2021-04-30 PROCEDURE — 85610 PROTHROMBIN TIME: CPT | Performed by: EMERGENCY MEDICINE

## 2021-04-30 PROCEDURE — 85730 THROMBOPLASTIN TIME PARTIAL: CPT | Performed by: EMERGENCY MEDICINE

## 2021-04-30 PROCEDURE — 96361 HYDRATE IV INFUSION ADD-ON: CPT

## 2021-04-30 PROCEDURE — 99285 EMERGENCY DEPT VISIT HI MDM: CPT

## 2021-04-30 PROCEDURE — 36415 COLL VENOUS BLD VENIPUNCTURE: CPT | Performed by: EMERGENCY MEDICINE

## 2021-04-30 PROCEDURE — 70450 CT HEAD/BRAIN W/O DYE: CPT

## 2021-04-30 PROCEDURE — 99285 EMERGENCY DEPT VISIT HI MDM: CPT | Performed by: EMERGENCY MEDICINE

## 2021-04-30 PROCEDURE — 96375 TX/PRO/DX INJ NEW DRUG ADDON: CPT

## 2021-04-30 RX ORDER — GABAPENTIN 100 MG/1
100 CAPSULE ORAL 3 TIMES DAILY
Status: DISCONTINUED | OUTPATIENT
Start: 2021-04-30 | End: 2021-05-01 | Stop reason: HOSPADM

## 2021-04-30 RX ORDER — METHYLPREDNISOLONE 16 MG/1
16 TABLET ORAL DAILY
Status: DISCONTINUED | OUTPATIENT
Start: 2021-05-02 | End: 2021-05-01 | Stop reason: HOSPADM

## 2021-04-30 RX ORDER — METHOCARBAMOL 750 MG/1
750 TABLET, FILM COATED ORAL EVERY 6 HOURS SCHEDULED
Status: DISCONTINUED | OUTPATIENT
Start: 2021-04-30 | End: 2021-05-01 | Stop reason: HOSPADM

## 2021-04-30 RX ORDER — DEXAMETHASONE SODIUM PHOSPHATE 4 MG/ML
10 INJECTION, SOLUTION INTRA-ARTICULAR; INTRALESIONAL; INTRAMUSCULAR; INTRAVENOUS; SOFT TISSUE ONCE
Status: COMPLETED | OUTPATIENT
Start: 2021-04-30 | End: 2021-04-30

## 2021-04-30 RX ORDER — SENNOSIDES 8.6 MG
2 TABLET ORAL DAILY
Status: DISCONTINUED | OUTPATIENT
Start: 2021-05-01 | End: 2021-05-01 | Stop reason: HOSPADM

## 2021-04-30 RX ORDER — SODIUM CHLORIDE, SODIUM GLUCONATE, SODIUM ACETATE, POTASSIUM CHLORIDE, MAGNESIUM CHLORIDE, SODIUM PHOSPHATE, DIBASIC, AND POTASSIUM PHOSPHATE .53; .5; .37; .037; .03; .012; .00082 G/100ML; G/100ML; G/100ML; G/100ML; G/100ML; G/100ML; G/100ML
75 INJECTION, SOLUTION INTRAVENOUS CONTINUOUS
Status: DISCONTINUED | OUTPATIENT
Start: 2021-04-30 | End: 2021-04-30

## 2021-04-30 RX ORDER — METHYLPREDNISOLONE 4 MG/1
12 TABLET ORAL DAILY
Status: DISCONTINUED | OUTPATIENT
Start: 2021-05-03 | End: 2021-05-01 | Stop reason: HOSPADM

## 2021-04-30 RX ORDER — OXYCODONE HYDROCHLORIDE 5 MG/1
5 TABLET ORAL EVERY 4 HOURS PRN
Status: DISCONTINUED | OUTPATIENT
Start: 2021-04-30 | End: 2021-05-01 | Stop reason: HOSPADM

## 2021-04-30 RX ORDER — DOCUSATE SODIUM 100 MG/1
100 CAPSULE, LIQUID FILLED ORAL 2 TIMES DAILY
Status: DISCONTINUED | OUTPATIENT
Start: 2021-04-30 | End: 2021-05-01 | Stop reason: HOSPADM

## 2021-04-30 RX ORDER — METHYLPREDNISOLONE 4 MG/1
4 TABLET ORAL DAILY
Status: DISCONTINUED | OUTPATIENT
Start: 2021-05-05 | End: 2021-05-01 | Stop reason: HOSPADM

## 2021-04-30 RX ORDER — LORAZEPAM 1 MG/1
1 TABLET ORAL EVERY 8 HOURS PRN
Status: DISCONTINUED | OUTPATIENT
Start: 2021-04-30 | End: 2021-05-01 | Stop reason: HOSPADM

## 2021-04-30 RX ORDER — ACETAMINOPHEN 325 MG/1
975 TABLET ORAL EVERY 8 HOURS SCHEDULED
Status: DISCONTINUED | OUTPATIENT
Start: 2021-04-30 | End: 2021-05-01 | Stop reason: HOSPADM

## 2021-04-30 RX ORDER — METHYLPREDNISOLONE 4 MG/1
8 TABLET ORAL DAILY
Status: DISCONTINUED | OUTPATIENT
Start: 2021-05-04 | End: 2021-05-01 | Stop reason: HOSPADM

## 2021-04-30 RX ORDER — ONDANSETRON 2 MG/ML
4 INJECTION INTRAMUSCULAR; INTRAVENOUS EVERY 6 HOURS PRN
Status: DISCONTINUED | OUTPATIENT
Start: 2021-04-30 | End: 2021-05-01 | Stop reason: HOSPADM

## 2021-04-30 RX ORDER — METOCLOPRAMIDE HYDROCHLORIDE 5 MG/ML
10 INJECTION INTRAMUSCULAR; INTRAVENOUS ONCE
Status: COMPLETED | OUTPATIENT
Start: 2021-04-30 | End: 2021-04-30

## 2021-04-30 RX ORDER — DIPHENHYDRAMINE HYDROCHLORIDE 50 MG/ML
25 INJECTION INTRAMUSCULAR; INTRAVENOUS ONCE
Status: COMPLETED | OUTPATIENT
Start: 2021-04-30 | End: 2021-04-30

## 2021-04-30 RX ADMIN — DEXAMETHASONE SODIUM PHOSPHATE 10 MG: 4 INJECTION, SOLUTION INTRAMUSCULAR; INTRAVENOUS at 08:42

## 2021-04-30 RX ADMIN — SODIUM CHLORIDE, SODIUM GLUCONATE, SODIUM ACETATE, POTASSIUM CHLORIDE, MAGNESIUM CHLORIDE, SODIUM PHOSPHATE, DIBASIC, AND POTASSIUM PHOSPHATE 75 ML/HR: .53; .5; .37; .037; .03; .012; .00082 INJECTION, SOLUTION INTRAVENOUS at 15:18

## 2021-04-30 RX ADMIN — METHOCARBAMOL TABLETS 750 MG: 750 TABLET, COATED ORAL at 15:18

## 2021-04-30 RX ADMIN — SODIUM CHLORIDE 1000 ML: 0.9 INJECTION, SOLUTION INTRAVENOUS at 08:43

## 2021-04-30 RX ADMIN — METOCLOPRAMIDE HYDROCHLORIDE 10 MG: 5 INJECTION INTRAMUSCULAR; INTRAVENOUS at 08:42

## 2021-04-30 RX ADMIN — METHYLPREDNISOLONE 24 MG: 16 TABLET ORAL at 15:18

## 2021-04-30 RX ADMIN — GABAPENTIN 100 MG: 100 CAPSULE ORAL at 15:19

## 2021-04-30 RX ADMIN — LORAZEPAM 1 MG: 1 TABLET ORAL at 21:59

## 2021-04-30 RX ADMIN — DIPHENHYDRAMINE HYDROCHLORIDE 25 MG: 50 INJECTION, SOLUTION INTRAMUSCULAR; INTRAVENOUS at 08:42

## 2021-04-30 RX ADMIN — ACETAMINOPHEN 975 MG: 325 TABLET, FILM COATED ORAL at 15:17

## 2021-04-30 RX ADMIN — ACETAMINOPHEN 975 MG: 325 TABLET, FILM COATED ORAL at 21:59

## 2021-04-30 RX ADMIN — METHOCARBAMOL TABLETS 750 MG: 750 TABLET, COATED ORAL at 23:14

## 2021-04-30 RX ADMIN — OXYCODONE HYDROCHLORIDE 5 MG: 5 TABLET ORAL at 20:35

## 2021-04-30 RX ADMIN — GABAPENTIN 100 MG: 100 CAPSULE ORAL at 20:32

## 2021-04-30 RX ADMIN — OXYCODONE HYDROCHLORIDE 5 MG: 5 TABLET ORAL at 16:03

## 2021-04-30 NOTE — H&P
H&P Exam - [address]  42 y o  female MRN: [id number]  Unit/Bed#: ED 18 Encounter: [id number]    Assessment/Plan   Trauma Alert: Evaluation  Model of Arrival: Ambulance  Trauma Team: Attending Cal Montero and CELINE Ugarte  Consultants: Neurosurgery    Trauma Active Problems: S/P Fall  Inferior bifrontal hemorrhagic contusions  Headache    Trauma Plan: Admit as HOT  Level 2 stepdown  Neurosurgery consult  Medrol dosepak  PT/OT  Frequent Neuro checks  DVT prophylaxis of Venodynes    Chief Complaint: Headache    History of Present Illness   HPI:  Radha Garnica is a 42 y o  female who presents with headache  States she fell from her brothers truck on Sunday, was seen in ER and HCT - negative  Has felt worse since, no appetite in 3 days, Headaches uncontrolled, was taking ADvil  No complaints of chest pain, no shortness of breath  Complains of neck tenderness and right paraspinal tendrness  History of spinal surgery and Bypass     No nasuea or vomiting, no spinal tenderness in T or L spine region  Moves all four extremities  Mechanism:Fall    Review of Systems   Constitutional: Positive for activity change and appetite change  HENT: Negative  Eyes: Negative  Respiratory: Negative  Cardiovascular: Negative  Gastrointestinal: Negative  Endocrine: Negative  Genitourinary: Negative  Musculoskeletal: Negative  Skin: Negative  Allergic/Immunologic: Negative  Neurological: Positive for headaches  Hematological: Negative  Psychiatric/Behavioral: Negative  12-point, complete review of systems was reviewed and negative except as stated above  Historical Information   History is obtainable from the patient     Efforts to obtain history included the following sources: transferring hospital    Past Medical History:   Diagnosis Date    Anemia     Anxiety     Chronic left-sided low back pain with left-sided sciatica 2/12/2020    Chronic pain     Chronic pain syndrome 2020    Depression     Diabetes ([facility] [address]     Diabetes mellitus ([facility] )     no meds    Psychiatric disorder     Vegetarian      Past Surgical History:   Procedure Laterality Date    ANTERIOR / POSTERIOR COMBINED FUSION THORACIC SPINE      BACK SURGERY       SECTION      GASTRIC BYPASS      JORGE-EN-Y PROCEDURE      2012    SALPINGECTOMY      TUBAL LIGATION       Social History   Social History     Substance and Sexual Activity   Alcohol Use Not Currently    Comment: occasionally     Social History     Substance and Sexual Activity   Drug Use Yes    Types: Marijuana    Comment: med  Correne Ege  for pain     Social History     Tobacco Use   Smoking Status Former Smoker    Packs/day: 0 50    Types: Cigarettes   Smokeless Tobacco Never Used     E-Cigarette/Vaping    E-Cigarette Use Current Some Day User      E-Cigarette/Vaping Substances    Nicotine No     THC No     CBD No     Flavoring No     Other No     Unknown No      Immunization History   Administered Date(s) Administered    Tdap 2021     Last Tetanus: unkown  Family History: Non-contributory        Meds/Allergies   all current active meds have been reviewed    Allergies   Allergen Reactions    Meat Extract     Nsaids GI Intolerance    Other Hives     Meat protein enzyme      Seroquel [Quetiapine] Other (See Comments)     Suicidal thoughts    Thorazine [Chlorpromazine]      Blisters in mouth per patient    Wellbutrin [Bupropion] Other (See Comments)     Suicidal thoughts    Celebrex [Celecoxib] Rash         PHYSICAL EXAM        Objective   Vitals:   First set: Temperature: 98 °F (36 7 °C) (21 1137)  Pulse: (!) 48 (21 1137)  Respirations: 18 (21 1137)  Blood Pressure: 130/70 (21 1137)    Primary Survey:   (A) Airway: intact  (B) Breathing: non-labored  (C) Circulation: Pulses:   normal  (D) Disabliity:  GCS Total:  15, Eye Opening:   Spontaneous = 4, Motor Response: Obeys commands = 6 and Verbal Response: Oriented = 5  (E) Expose:  Completed    Secondary Survey: (Click on Physical Exam tab above)  Physical Exam  Constitutional:       Appearance: Normal appearance  HENT:      Head: Normocephalic and atraumatic  Right Ear: Tympanic membrane normal       Left Ear: Tympanic membrane normal       Nose: Nose normal       Mouth/Throat:      Pharynx: Oropharynx is clear  Eyes:      Extraocular Movements: Extraocular movements intact  Conjunctiva/sclera: Conjunctivae normal       Pupils: Pupils are equal, round, and reactive to light  Comments: Pupils 4mm bilaterally  States has Marijuana card but had not smoked since yesterday   Cardiovascular:      Rate and Rhythm: Normal rate and regular rhythm  Pulmonary:      Effort: Pulmonary effort is normal       Breath sounds: Normal breath sounds  Abdominal:      General: Bowel sounds are normal       Palpations: Abdomen is soft  Skin:     Capillary Refill: Capillary refill takes less than 2 seconds  Neurological:      General: No focal deficit present  Mental Status: She is alert and oriented to person, place, and time  Psychiatric:         Mood and Affect: Mood normal          Behavior: Behavior normal          Thought Content: Thought content normal          Judgment: Judgment normal          Invasive Devices     Peripheral Intravenous Line            Peripheral IV 04/30/21 Right Forearm less than 1 day                Lab Results: Results for Zeynep Hernandez (MRN [id number]) as of 4/30/2021 13:10   Ref   Range 4/30/2021 10:03   Sodium Latest Ref Range: 136 - 145 mmol/L 141   Potassium Latest Ref Range: 3 5 - 5 3 mmol/L 3 7   Chloride Latest Ref Range: 100 - 108 mmol/L 107   CO2 Latest Ref Range: 21 - 32 mmol/L 24   Anion Gap Latest Ref Range: 4 - 13 mmol/L 10   BUN Latest Ref Range: 5 - 25 mg/dL 7   Creatinine Latest Ref Range: 0 60 - 1 30 mg/dL 0 62   Glucose, Random Latest Ref Range: 65 - 140 mg/dL 127   Calcium Latest Ref Range: 8 3 - 10 1 mg/dL 8 2 (L)   CORRECTED CALCIUM Latest Ref Range: 8 3 - 10 1 mg/dL 9 0   AST Latest Ref Range: 5 - 45 U/L 19   ALT Latest Ref Range: 12 - 78 U/L 29   Alkaline Phosphatase Latest Ref Range: 46 - 116 U/L 77   Total Protein Latest Ref Range: 6 4 - 8 2 g/dL 6 3 (L)   Albumin Latest Ref Range: 3 5 - 5 0 g/dL 3 0 (L)   TOTAL BILIRUBIN Latest Ref Range: 0 20 - 1 00 mg/dL 0 23   eGFR Latest Units: ml/min/1 73sq m 112   WBC Latest Ref Range: 4 31 - 10 16 Thousand/uL 6 10   Red Blood Cell Count Latest Ref Range: 3 81 - 5 12 Million/uL 3 51 (L)   Hemoglobin Latest Ref Range: 11 5 - 15 4 g/dL 10 5 (L)   HCT Latest Ref Range: 34 8 - 46 1 % 32 3 (L)   MCV Latest Ref Range: 82 - 98 fL 92   MCH Latest Ref Range: 26 8 - 34 3 pg 29 9   MCHC Latest Ref Range: 31 4 - 37 4 g/dL 32 5   RDW Latest Ref Range: 11 6 - 15 1 % 13 3   Platelet Count Latest Ref Range: 149 - 390 Thousands/uL 379   MPV Latest Ref Range: 8 9 - 12 7 fL 9 2   nRBC Latest Units: /100 WBCs 0   Neutrophils % Latest Ref Range: 43 - 75 % 80 (H)   Immat GRANS % Latest Ref Range: 0 - 2 % 0   Lymphocytes Relative Latest Ref Range: 14 - 44 % 15   Monocytes Relative Latest Ref Range: 4 - 12 % 3 (L)   Eosinophils Latest Ref Range: 0 - 6 % 1   Basophils Relative Latest Ref Range: 0 - 1 % 1   Immature Grans Absolute Latest Ref Range: 0 00 - 0 20 Thousand/uL 0 01   Absolute Neutrophils Latest Ref Range: 1 85 - 7 62 Thousands/µL 4 86   Lymphocytes Absolute Latest Ref Range: 0 60 - 4 47 Thousands/µL 0 92   Absolute Monocytes Latest Ref Range: 0 17 - 1 22 Thousand/µL 0 21   Absolute Eosinophils Latest Ref Range: 0 00 - 0 61 Thousand/µL 0 07   Basophils Absolute Latest Ref Range: 0 00 - 0 10 Thousands/µL 0 03   Protime Latest Ref Range: 11 6 - 14 5 seconds 12 3   INR Latest Ref Range: 0 84 - 1 19  0 92   PTT Latest Ref Range: 23 - 37 seconds 27     Imaging/EKG Studies: HCT - Inferior bifrontal lobe hemorrhagic contusions        Other Studies: none    Code Status: Prior  Advance Directive and Living Will:      Power of :    POLST:

## 2021-04-30 NOTE — ASSESSMENT & PLAN NOTE
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

## 2021-04-30 NOTE — PLAN OF CARE
Problem: PAIN - ADULT  Goal: Verbalizes/displays adequate comfort level or baseline comfort level  Description: Interventions:  - Encourage patient to monitor pain and request assistance  - Assess pain using appropriate pain scale  - Administer analgesics based on type and severity of pain and evaluate response  - Implement non-pharmacological measures as appropriate and evaluate response  - Consider cultural and social influences on pain and pain management  - Notify physician/advanced practitioner if interventions unsuccessful or patient reports new pain  Outcome: Progressing     Problem: INFECTION - ADULT  Goal: Absence or prevention of progression during hospitalization  Description: INTERVENTIONS:  - Assess and monitor for signs and symptoms of infection  - Monitor lab/diagnostic results  - Monitor all insertion sites, i e  indwelling lines, tubes, and drains  - Monitor endotracheal if appropriate and nasal secretions for changes in amount and color  - [place] appropriate cooling/warming therapies per order  - Administer medications as ordered  - Instruct and encourage patient and family to use good hand hygiene technique  - Identify and instruct in appropriate isolation precautions for identified infection/condition  Outcome: Progressing     Problem: SAFETY ADULT  Goal: Patient will remain free of falls  Description: INTERVENTIONS:  - Assess patient frequently for physical needs  -  Identify cognitive and physical deficits and behaviors that affect risk of falls    -  [place] fall precautions as indicated by assessment   - Educate patient/family on patient safety including physical limitations  - Instruct patient to call for assistance with activity based on assessment  - Modify environment to reduce risk of injury  - Consider OT/PT consult to assist with strengthening/mobility  Outcome: Progressing  Goal: Maintain or return to baseline ADL function  Description: INTERVENTIONS:  -  Assess patient's ability to carry out ADLs; assess patient's baseline for ADL function and identify physical deficits which impact ability to perform ADLs (bathing, care of mouth/teeth, toileting, grooming, dressing, etc )  - Assess/evaluate cause of self-care deficits   - Assess range of motion  - Assess patient's mobility; develop plan if impaired  - Assess patient's need for assistive devices and provide as appropriate  - Encourage maximum independence but intervene and supervise when necessary  - Involve family in performance of ADLs  - Assess for home care needs following discharge   - Consider OT consult to assist with ADL evaluation and planning for discharge  - Provide patient education as appropriate  Outcome: Progressing  Goal: Maintain or return mobility status to optimal level  Description: INTERVENTIONS:  - Assess patient's baseline mobility status (ambulation, transfers, stairs, etc )    - Identify cognitive and physical deficits and behaviors that affect mobility  - Identify mobility aids required to assist with transfers and/or ambulation (gait belt, sit-to-stand, lift, walker, cane, etc )  - [place] fall precautions as indicated by assessment  - Record patient progress and toleration of activity level on Mobility SBAR; progress patient to next Phase/Stage  - Instruct patient to call for assistance with activity based on assessment  - Consider rehabilitation consult to assist with strengthening/weightbearing, etc   Outcome: Progressing     Problem: DISCHARGE PLANNING  Goal: Discharge to home or other facility with appropriate resources  Description: INTERVENTIONS:  - Identify barriers to discharge w/patient and caregiver  - Arrange for needed discharge resources and transportation as appropriate  - Identify discharge learning needs (meds, wound care, etc )  - Arrange for interpretive services to assist at discharge as needed  - Refer to Case Management Department for coordinating discharge planning if the patient needs post-hospital services based on physician/advanced practitioner order or complex needs related to functional status, cognitive ability, or social support system  Outcome: Progressing     Problem: Knowledge Deficit  Goal: Patient/family/caregiver demonstrates understanding of disease process, treatment plan, medications, and discharge instructions  Description: Complete learning assessment and assess knowledge base  Interventions:  - Provide teaching at level of understanding  - Provide teaching via preferred learning methods  Outcome: Progressing     Problem: NEUROSENSORY - ADULT  Goal: Achieves stable or improved neurological status  Description: INTERVENTIONS  - Monitor and report changes in neurological status  - Monitor vital signs such as temperature, blood pressure, glucose, and any other labs ordered   - Initiate measures to prevent increased intracranial pressure  - Monitor for seizure activity and implement precautions if appropriate      Outcome: Progressing  Goal: Remains free of injury related to seizures activity  Description: INTERVENTIONS  - Maintain airway, patient safety  and administer oxygen as ordered  - Monitor patient for seizure activity, document and report duration and description of seizure to physician/advanced practitioner  - If seizure occurs,  ensure patient safety during seizure  - Reorient patient post seizure  - Seizure pads on all 4 side rails  - Instruct patient/family to notify RN of any seizure activity including if an aura is experienced  - Instruct patient/family to call for assistance with activity based on nursing assessment  - Administer anti-seizure medications if ordered    Outcome: Progressing  Goal: Achieves maximal functionality and self care  Description: INTERVENTIONS  - Monitor swallowing and airway patency with patient fatigue and changes in neurological status  - Encourage and assist patient to increase activity and self care     - Encourage visually impaired, hearing impaired and aphasic patients to use assistive/communication devices  Outcome: Progressing     Problem: SKIN/TISSUE INTEGRITY - ADULT  Goal: Skin integrity remains intact  Description: INTERVENTIONS  - Identify patients at risk for skin breakdown  - Assess and monitor skin integrity  - Assess and monitor nutrition and hydration status  - Monitor labs (i e  albumin)  - Assess for incontinence   - Turn and reposition patient  - Assist with mobility/ambulation  - Relieve pressure over bony prominences  - Avoid friction and shearing  - Provide appropriate hygiene as needed including keeping skin clean and dry  - Evaluate need for skin moisturizer/barrier cream  - Collaborate with interdisciplinary team (i e  Nutrition, Rehabilitation, etc )   - Patient/family teaching  Outcome: Progressing  Goal: Incision(s), wounds(s) or drain site(s) healing without S/S of infection  Description: INTERVENTIONS  - Assess and document risk factors for skin impairment   - Assess and document dressing, incision, wound bed, drain sites and surrounding tissue  - Consider nutrition services referral as needed  - Oral mucous membranes remain intact  - Provide patient/ family education  Outcome: Progressing  Goal: Oral mucous membranes remain intact  Description: INTERVENTIONS  - Assess oral mucosa and hygiene practices  - Implement preventative oral hygiene regimen  - Implement oral medicated treatments as ordered  - Initiate Nutrition services referral as needed  Outcome: Progressing     Problem: MUSCULOSKELETAL - ADULT  Goal: Maintain or return mobility to safest level of function  Description: INTERVENTIONS:  - Assess patient's ability to carry out ADLs; assess patient's baseline for ADL function and identify physical deficits which impact ability to perform ADLs (bathing, care of mouth/teeth, toileting, grooming, dressing, etc )  - Assess/evaluate cause of self-care deficits   - Assess range of motion  - Assess patient's mobility  - Assess patient's need for assistive devices and provide as appropriate  - Encourage maximum independence but intervene and supervise when necessary  - Involve family in performance of ADLs  - Assess for home care needs following discharge   - Consider OT consult to assist with ADL evaluation and planning for discharge  - Provide patient education as appropriate  Outcome: Progressing  Goal: Maintain proper alignment of affected body part  Description: INTERVENTIONS:  - Support, maintain and protect limb and body alignment  - Provide patient/ family with appropriate education  Outcome: Progressing
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
## 2021-04-30 NOTE — EMTALA/ACUTE CARE TRANSFER
[address] [address]  [address]: [address]                                          1980                              MRN [id number]    I have been informed of my rights regarding examination, treatment, and transfer   by Dr Lobo López DO    Benefits: Specialized equipment and/or services available at the receiving facility (Include comment)________________________    Risks: Potential for delay in receiving treatment, Potential deterioration of medical condition, Loss of IV, Increased discomfort during transfer, Possible worsening of condition or death during transfer      Consent for Transfer:  I acknowledge that my medical condition has been evaluated and explained to me by the emergency department physician or other qualified medical person and/or my attending physician, who has recommended that I be transferred to the service of  Accepting Physician: Dr Itz Rey at [address] [place] [address] Name, [address] : [address] [place], [place]  The above potential benefits of such transfer, the potential risks associated with such transfer, and the probable risks of not being transferred have been explained to me, and I fully understand them  The doctor has explained that, in my case, the benefits of transfer outweigh the risks  I agree to be transferred  I authorize the performance of emergency medical procedures and treatments upon me in both transit and upon arrival at the receiving facility  Additionally, I authorize the release of any and all medical records to the receiving facility and request they be transported with me, if possible  I understand that the safest mode of transportation during a medical emergency is an ambulance and that the Hospital advocates the use of this mode of transport   Risks of traveling to the receiving facility by car, including absence of medical control, life sustaining equipment, such as oxygen, and medical personnel has been explained to me and I fully understand them  (LEONIE CORRECT BOX BELOW)  [  ]  I consent to the stated transfer and to be transported by ambulance/helicopter  [  ]  I consent to the stated transfer, but refuse transportation by ambulance and accept full responsibility for my transportation by car  I understand the risks of non-ambulance transfers and I exonerate the Hospital and its staff from any deterioration in my condition that results from this refusal     X___________________________________________    DATE  21  TIME________  Signature of patient or legally responsible individual signing on patient behalf           RELATIONSHIP TO PATIENT_________________________          Provider [address] [place] [address]                                         1980                              MRN [id number]    A medical screening exam was performed on the above named patient  Based on the examination:    Condition Necessitating Transfer The encounter diagnosis was Traumatic intracranial hemorrhage ([facility] [address] )      Patient Condition: The patient has been stabilized such that within reasonable medical probability, no material deterioration of the patient condition or the condition of the unborn child(freddy) is likely to result from the transfer    Reason for Transfer: Level of Care needed not available at this facility    Transfer Requirements: [address]   · Space available and qualified personnel available for treatment as acknowledged by    · Agreed to accept transfer and to provide appropriate medical treatment as acknowledged by       Dr Shiv Bermudez  · Appropriate medical records of the examination and treatment of the patient are provided at the time of transfer   [address] _______  · Transfer will be performed by qualified personnel from    and appropriate transfer equipment as required, including the use of necessary and appropriate life support measures  Provider Certification: I have examined the patient and explained the following risks and benefits of being transferred/refusing transfer to the patient/family:  General risk, such as traffic hazards, adverse weather conditions, rough terrain or turbulence, possible failure of equipment (including vehicle or aircraft), or consequences of actions of persons outside the control of the transport personnel      Based on these reasonable risks and benefits to the patient and/or the unborn child(freddy), and based upon the information available at the time of the patients examination, I certify that the medical benefits reasonably to be expected from the provision of appropriate medical treatments at another medical facility outweigh the increasing risks, if any, to the individuals medical condition, and in the case of labor to the unborn child, from effecting the transfer      X____________________________________________ DATE 04/30/21        TIME_______      ORIGINAL - SEND TO MEDICAL RECORDS   COPY - SEND WITH PATIENT DURING TRANSFER

## 2021-04-30 NOTE — ED NOTES
Left sided pins and needles  "I had back surgery, but I never had anything until I fell"  Pt's surgery at [facility], 4 years ago  Pt states no sleep last night  "the headache, I close my eyes and the pain"  Pt describes pain as pressure and throbbing behind eyes        Edith Velázquez RN  04/30/21 1590
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
## 2021-04-30 NOTE — ASSESSMENT & PLAN NOTE
Complaining of right paraspinal neck pain  Imaging:  · CT cervical spine without 4/26/2021:  No cervical spine fractures or traumatic malalignment  Straightening of normal lordosis  Plan:  · Pain control per primary team   Would agree with muscle relaxant as on exam there is tenderness palpation along the right paraspinal cervical musculature along with trapezius  No midline tenderness to palpation  · Monitor for any new symptoms
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
## 2021-05-01 ENCOUNTER — APPOINTMENT (OUTPATIENT)
Dept: RADIOLOGY | Facility: HOSPITAL | Age: 41
End: 2021-05-01
Payer: COMMERCIAL

## 2021-05-01 VITALS
HEART RATE: 68 BPM | DIASTOLIC BLOOD PRESSURE: 68 MMHG | SYSTOLIC BLOOD PRESSURE: 109 MMHG | TEMPERATURE: 98.3 F | RESPIRATION RATE: 16 BRPM | OXYGEN SATURATION: 99 %

## 2021-05-01 LAB
ANION GAP SERPL CALCULATED.3IONS-SCNC: 8 MMOL/L (ref 4–13)
BUN SERPL-MCNC: 9 MG/DL (ref 5–25)
CALCIUM SERPL-MCNC: 8.2 MG/DL (ref 8.3–10.1)
CHLORIDE SERPL-SCNC: 111 MMOL/L (ref 100–108)
CO2 SERPL-SCNC: 24 MMOL/L (ref 21–32)
CREAT SERPL-MCNC: 0.5 MG/DL (ref 0.6–1.3)
ERYTHROCYTE [DISTWIDTH] IN BLOOD BY AUTOMATED COUNT: 13.5 % (ref 11.6–15.1)
GFR SERPL CREATININE-BSD FRML MDRD: 120 ML/MIN/1.73SQ M
GLUCOSE SERPL-MCNC: 87 MG/DL (ref 65–140)
HCT VFR BLD AUTO: 32.6 % (ref 34.8–46.1)
HGB BLD-MCNC: 10.6 G/DL (ref 11.5–15.4)
MCH RBC QN AUTO: 30 PG (ref 26.8–34.3)
MCHC RBC AUTO-ENTMCNC: 32.5 G/DL (ref 31.4–37.4)
MCV RBC AUTO: 92 FL (ref 82–98)
PLATELET # BLD AUTO: 452 THOUSANDS/UL (ref 149–390)
PMV BLD AUTO: 9.3 FL (ref 8.9–12.7)
POTASSIUM SERPL-SCNC: 3.1 MMOL/L (ref 3.5–5.3)
RBC # BLD AUTO: 3.53 MILLION/UL (ref 3.81–5.12)
SODIUM SERPL-SCNC: 143 MMOL/L (ref 136–145)
WBC # BLD AUTO: 11.12 THOUSAND/UL (ref 4.31–10.16)

## 2021-05-01 PROCEDURE — G1004 CDSM NDSC: HCPCS

## 2021-05-01 PROCEDURE — 72131 CT LUMBAR SPINE W/O DYE: CPT

## 2021-05-01 PROCEDURE — NC001 PR NO CHARGE: Performed by: SURGERY

## 2021-05-01 PROCEDURE — 97166 OT EVAL MOD COMPLEX 45 MIN: CPT

## 2021-05-01 PROCEDURE — 97162 PT EVAL MOD COMPLEX 30 MIN: CPT

## 2021-05-01 PROCEDURE — 80048 BASIC METABOLIC PNL TOTAL CA: CPT | Performed by: NURSE PRACTITIONER

## 2021-05-01 PROCEDURE — 99214 OFFICE O/P EST MOD 30 MIN: CPT | Performed by: PHYSICIAN ASSISTANT

## 2021-05-01 PROCEDURE — 85027 COMPLETE CBC AUTOMATED: CPT | Performed by: NURSE PRACTITIONER

## 2021-05-01 PROCEDURE — 99217 PR OBSERVATION CARE DISCHARGE MANAGEMENT: CPT | Performed by: SURGERY

## 2021-05-01 PROCEDURE — 70450 CT HEAD/BRAIN W/O DYE: CPT

## 2021-05-01 RX ORDER — ACETAMINOPHEN 325 MG/1
975 TABLET ORAL EVERY 8 HOURS SCHEDULED
Qty: 30 TABLET | Refills: 0 | Status: SHIPPED | OUTPATIENT
Start: 2021-05-01

## 2021-05-01 RX ORDER — METHOCARBAMOL 750 MG/1
750 TABLET, FILM COATED ORAL EVERY 6 HOURS SCHEDULED
Qty: 30 TABLET | Refills: 0 | Status: SHIPPED | OUTPATIENT
Start: 2021-05-01 | End: 2021-05-08

## 2021-05-01 RX ORDER — MECLIZINE HCL 12.5 MG/1
12.5 TABLET ORAL EVERY 8 HOURS SCHEDULED
Status: DISCONTINUED | OUTPATIENT
Start: 2021-05-01 | End: 2021-05-01 | Stop reason: HOSPADM

## 2021-05-01 RX ORDER — MECLIZINE HCL 12.5 MG/1
12.5 TABLET ORAL EVERY 8 HOURS SCHEDULED
Qty: 30 TABLET | Refills: 0 | Status: SHIPPED | OUTPATIENT
Start: 2021-05-01

## 2021-05-01 RX ORDER — POTASSIUM CHLORIDE 14.9 MG/ML
20 INJECTION INTRAVENOUS ONCE
Status: COMPLETED | OUTPATIENT
Start: 2021-05-01 | End: 2021-05-01

## 2021-05-01 RX ORDER — POTASSIUM CHLORIDE 20 MEQ/1
20 TABLET, EXTENDED RELEASE ORAL ONCE
Status: COMPLETED | OUTPATIENT
Start: 2021-05-01 | End: 2021-05-01

## 2021-05-01 RX ORDER — METHYLPREDNISOLONE 4 MG/1
TABLET ORAL
Qty: 21 TABLET | Refills: 0 | Status: SHIPPED | OUTPATIENT
Start: 2021-05-01

## 2021-05-01 RX ADMIN — METHYLPREDNISOLONE 20 MG: 16 TABLET ORAL at 08:42

## 2021-05-01 RX ADMIN — METHOCARBAMOL TABLETS 750 MG: 750 TABLET, COATED ORAL at 05:34

## 2021-05-01 RX ADMIN — GABAPENTIN 100 MG: 100 CAPSULE ORAL at 08:42

## 2021-05-01 RX ADMIN — MECLIZINE HCL 12.5 MG 12.5 MG: 12.5 TABLET ORAL at 08:42

## 2021-05-01 RX ADMIN — POTASSIUM CHLORIDE 20 MEQ: 1500 TABLET, EXTENDED RELEASE ORAL at 08:42

## 2021-05-01 RX ADMIN — ACETAMINOPHEN 975 MG: 325 TABLET, FILM COATED ORAL at 05:34

## 2021-05-01 RX ADMIN — POTASSIUM CHLORIDE 20 MEQ: 14.9 INJECTION, SOLUTION INTRAVENOUS at 08:43

## 2021-05-01 RX ADMIN — OXYCODONE HYDROCHLORIDE 5 MG: 5 TABLET ORAL at 02:15
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
## 2021-05-01 NOTE — ASSESSMENT & PLAN NOTE
Neurosurgery consulted and following  Headache relief this morning, Started Medrol dosepak last evening  Mild vertigo  GCS - 15

## 2021-05-01 NOTE — ASSESSMENT & PLAN NOTE
No complaints of neck pain this morning  Will continue to monitor  Pain regimen effective per patient
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
## 2021-05-01 NOTE — PROGRESS NOTES
Nuussuataap Aqq  192 1980, 42 y o  female MRN: [id number]  Unit/Bed#: [facility] 601-01 Encounter: [id number]  Primary Care Provider: Regina Mcgraw MD   Date and time admitted to hospital: 4/30/2021 11:29 AM    * Cerebral contusion [facility])  Assessment & Plan  Bifrontal hemorrhagic contusion with cerebral edema  · Patient presents with progressive concussive symptoms  · Status post fall out of brothers truck April 26    Imaging:  · CT head without contrast 5/1/2021: Bifrontal hemorrhagic parenchymal contusions are slightly increased compared to the prior study  Bifrontal edema has increased slightly  There may be a small amount of subdural hemorrhage along the anterior falx, measuring 1 8 mm maximal transverse thickness  Possible minimal subarachnoid hemorrhage  No midline shift  No hydrocephalus  · CT head without contrast 4/30/2021:  Inferior bifrontal lobe hemorrhagic contusion with cerebral edema  · CT head without contrast 4/26/2021:  Reported no acute abnormalities  Retrospectively there may be small petechial hemorrhage in bifrontal lobes vs bone artifact  Plan:  · Continue monitor neurological exam with frequent neurological checks  · Stat CT head with any neurological decline  · Hold all anti-platelet anticoagulation medications  · CT head results reviewed with patient, showing slightly worsening edema and some areas of new hemorrhage  No significant compression or shift  · No neurosurgical intervention indicated at this juncture  · Continue pain control per primary team   Agrees multimodal regimen  Given progressive concussive symptoms, reasonable to trial  Medrol Dosepak  · May mobilize from neurosurgical standpoint  · DVT prophylaxis:  Bilateral SCDs  Will defer pharmacological DVT prophylaxis until stable CT head obtained  · Call if any questions or concerns in the interim  · Primary team updated on plan of care      From a neurosurgical perspective, patient is stable to discharge when medically appropriate  Would hold AC/AP medications for 2 weeks  If staying inpatient through the weekend hold pharm ppx today and repeat [facility] tomorrow given new areas of hemorrhage, although this is minimal when reviewed personally with attending  Follow up in 2 weeks with repeat CTH  Neurosurgery will sign off, please call with questions or concerns  Left leg numbness  Assessment & Plan  Patient complaining of left thigh and knee numbness  · Patient reports history of ALIF 2014 L2-L3, 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  · Patient on gabapentin baseline for bilateral radiculopathy  · *today states it is her RLE    Imaging:  · CT lumbar spine w/o, 5/1/21: evidence of intact PLDF and multiple ALIFs  Alignment intact  Plan:  · Continue monitor exam   Currently with full strength and normal reflexes  · Pain control per primary team  · If symptoms do not improve, can consider MRI with contrast (2/2 hardware) or CT myelogram in the outpatient setting    Neck pain  Assessment & Plan  Complaining of right paraspinal neck pain  Imaging:  · CT cervical spine without 4/26/2021:  No cervical spine fractures or traumatic malalignment  Straightening of normal lordosis  Plan:  · Pain control per primary team   Would agree with muscle relaxant as on exam there is tenderness palpation along the right paraspinal cervical musculature along with trapezius  No midline tenderness to palpation  · Monitor for any new symptoms  Cerebral edema [facility])  Assessment & Plan  Secondary to cerebral traumatic hemorrhagic contusions  - see plan as above        Subjective/Objective   Chief Complaint: "I have a mild headache"    Subjective: Patient with improvement in headache and dizziness overnight  Was started on a medrol dose pack yesterday which seems to be helping   No focal deficits on exam  Complaining of right leg pain today similar to prior to her lumbar surgeries  Imaging reviewed with the patient  Objective: Patient laying in bed in NAD  VSS  Intake/Output       05/01/21 0701 - 05/02/21 0700      [id number] Total       Intake    Total Intake -- -- --       Output    Urine  --  -- --    Unmeasured Urine Occurrence 1 x -- 1 x    Stool  --  -- --    Unmeasured Stool Occurrence 1 x -- 1 x    Total Output -- -- --       Net I/O     -- -- --          Invasive Devices     Peripheral Intravenous Line            Peripheral IV 04/30/21 Right Forearm 1 day                Vitals: Blood pressure 109/68, pulse 68, temperature 98 3 °F (36 8 °C), resp  rate 16, last menstrual period 04/05/2021, SpO2 99 %, not currently breastfeeding  ,There is no height or weight on file to calculate BMI  General appearance: alert, appears stated age, cooperative and no distress  Head: Normocephalic, without obvious abnormality, atraumatic  Eyes: EOMI, PERRL  Lungs: non labored breathing  Heart: regular heart rate  Neurologic:   Mental status: Alert, oriented x4, thought content appropriate, speech clear and fluent  Cranial nerves: grossly intact (Cranial nerves II-XII)  Sensory: normal to LT x4  Motor: moving all extremities without focal weakness, 5/5 strength throughout  Reflexes: 2+ and symmetric  Coordination: finger to nose normal bilaterally, no drift bilaterally    Lab Results: I have personally reviewed pertinent results        Results from last 7 days   Lab Units 05/01/21 0429 04/30/21  1003   WBC Thousand/uL 11 12* 6 10   HEMOGLOBIN g/dL 10 6* 10 5*   HEMATOCRIT % 32 6* 32 3*   PLATELETS Thousands/uL 452* 379   NEUTROS PCT %  --  80*   MONOS PCT %  --  3*     Results from last 7 days   Lab Units 05/01/21 0429 04/30/21  1003   POTASSIUM mmol/L 3 1* 3 7   CHLORIDE mmol/L 111* 107   CO2 mmol/L 24 24   BUN mg/dL 9 7   CREATININE mg/dL 0 50* 0 62   CALCIUM mg/dL 8 2* 8 2*   ALK PHOS U/L  --  77   ALT U/L  --  29   AST U/L  --  19             Results from last 7 days   Lab Units 04/30/21  1003   INR  0 92   PTT seconds 27     No results found for: TROPONINT  ABG:No results found for: PHART, ETG4OBF, PO2ART, LHA7LEV, T6OCBXWO, BEART, SOURCE    Imaging Studies: I have personally reviewed pertinent reports  and I have personally reviewed pertinent films in PACS   Ct Head Wo Contrast    Result Date: 5/1/2021  Narrative: CT BRAIN - WITHOUT CONTRAST INDICATION:   Intracranial hemorrhage, follow up Bifrontal hemorrhagic contusions follow-up  COMPARISON:  April 30, 2021  TECHNIQUE:  CT examination of the brain was performed  In addition to axial images, sagittal and coronal 2D reformatted images were created and submitted for interpretation  Radiation dose length product (DLP) for this visit:  736 39 mGy-cm   This examination, like all CT scans performed in the [facility], was performed utilizing techniques to minimize radiation dose exposure, including the use of iterative  reconstruction and automated exposure control  IMAGE QUALITY:  Diagnostic  FINDINGS: PARENCHYMA, VENTRICLES AND EXTRA-AXIAL SPACES:  Bifrontal hemorrhagic parenchymal contusions are slightly increased compared to the prior study  Bifrontal edema has increased slightly  There may be a small amount of subdural hemorrhage along the anterior falx, measuring 1 8 mm maximal transverse thickness  Possible minimal subarachnoid hemorrhage  No midline shift  No hydrocephalus  VISUALIZED ORBITS AND PARANASAL SINUSES:  Unremarkable  CALVARIUM AND EXTRACRANIAL SOFT TISSUES:  Normal      Impression: Bifrontal hemorrhagic parenchymal contusions and associated edema are slightly increased compared with April 30, 2021  There may be a small amount of subdural hemorrhage along the anterior falx, measuring 1 8 mm maximal transverse thickness  Possible minimal subarachnoid hemorrhage  The images are available for clinical review  The study was marked in [facility] for immediate notification   Workstation performed: [id number]     Ct Head Without Contrast    Result Date: 4/30/2021  Narrative: CT BRAIN - WITHOUT CONTRAST INDICATION:   Headache, acute, normal neuro exam headache  COMPARISON:  None  TECHNIQUE:  CT examination of the brain was performed  In addition to axial images, sagittal and coronal 2D reformatted images were created and submitted for interpretation  Radiation dose length product (DLP) for this visit:  867 68 mGy-cm   This examination, like all CT scans performed in the [facility], was performed utilizing techniques to minimize radiation dose exposure, including the use of iterative  reconstruction and automated exposure control  IMAGE QUALITY:  Diagnostic  FINDINGS: PARENCHYMA:  Bifrontal hemorrhagic contusions are identified within the inferior frontal lobes  No significant mass effect is identified  Remaining brain parenchyma is unremarkable  VENTRICLES AND EXTRA-AXIAL SPACES:  Normal for the patient's age  VISUALIZED ORBITS AND PARANASAL SINUSES:  Unremarkable  CALVARIUM AND EXTRACRANIAL SOFT TISSUES:  Normal      Impression: Inferior bifrontal lobe hemorrhagic contusions  I personally discussed this study with Taryn Shah on 4/30/2021 at 9:37 AM  Workstation performed: [id number]     Ct Head Without Contrast    Result Date: 4/27/2021  Narrative: CT BRAIN - WITHOUT CONTRAST INDICATION:   Head trauma, minor, normal mental status (Age 19-64y) head trauma  COMPARISON:  None  TECHNIQUE:  CT examination of the brain was performed  In addition to axial images, sagittal and coronal 2D reformatted images were created and submitted for interpretation  Radiation dose length product (DLP) for this visit:  964 mGy-cm   This examination, like all CT scans performed in the [facility], was performed utilizing techniques to minimize radiation dose exposure, including the use of iterative reconstruction and automated exposure control    IMAGE QUALITY: Diagnostic  FINDINGS: PARENCHYMA:  No intracranial mass, mass effect or midline shift  No CT signs of acute infarction  No acute parenchymal hemorrhage  VENTRICLES AND EXTRA-AXIAL SPACES:  Normal for the patient's age  VISUALIZED ORBITS AND PARANASAL SINUSES:  Unremarkable  CALVARIUM AND EXTRACRANIAL SOFT TISSUES:  Normal      Impression: No acute intracranial abnormality  Workstation performed: [id number]     Ct Spine Cervical Without Contrast    Result Date: 4/27/2021  Narrative: CT CERVICAL SPINE - WITHOUT CONTRAST INDICATION:   Neck pain, initial exam fall  COMPARISON:  None  TECHNIQUE:  CT examination of the cervical spine was performed without intravenous contrast   Contiguous axial images were obtained  Sagittal and coronal reconstructions were performed  Radiation dose length product (DLP) for this visit:  378 mGy-cm   This examination, like all CT scans performed in the [facility], was performed utilizing techniques to minimize radiation dose exposure, including the use of iterative reconstruction and automated exposure control  IMAGE QUALITY:  Diagnostic  FINDINGS: ALIGNMENT:  Normal alignment of the cervical spine  No subluxation  VERTEBRAL BODIES:  No fracture  DEGENERATIVE CHANGES:  No significant cervical degenerative changes are noted  PREVERTEBRAL AND PARASPINAL SOFT TISSUES:  Unremarkable  THORACIC INLET:  Normal      Impression: No cervical spine fracture or traumatic malalignment  Workstation performed: [id number]     EKG, Pathology, and Other Studies: I have personally reviewed pertinent reports        VTE Pharmacologic Prophylaxis: hold pharm ppx    VTE Mechanical Prophylaxis: sequential compression device

## 2021-05-01 NOTE — ASSESSMENT & PLAN NOTE
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

## 2021-05-01 NOTE — PLAN OF CARE
Problem: PAIN - ADULT  Goal: Verbalizes/displays adequate comfort level or baseline comfort level  Description: Interventions:  - Encourage patient to monitor pain and request assistance  - Assess pain using appropriate pain scale  - Administer analgesics based on type and severity of pain and evaluate response  - Implement non-pharmacological measures as appropriate and evaluate response  - Consider cultural and social influences on pain and pain management  - Notify physician/advanced practitioner if interventions unsuccessful or patient reports new pain  Outcome: Progressing     Problem: INFECTION - ADULT  Goal: Absence or prevention of progression during hospitalization  Description: INTERVENTIONS:  - Assess and monitor for signs and symptoms of infection  - Monitor lab/diagnostic results  - Monitor all insertion sites, i e  indwelling lines, tubes, and drains  - Monitor endotracheal if appropriate and nasal secretions for changes in amount and color  - [place] appropriate cooling/warming therapies per order  - Administer medications as ordered  - Instruct and encourage patient and family to use good hand hygiene technique  - Identify and instruct in appropriate isolation precautions for identified infection/condition  Outcome: Progressing     Problem: SAFETY ADULT  Goal: Patient will remain free of falls  Description: INTERVENTIONS:  - Assess patient frequently for physical needs  -  Identify cognitive and physical deficits and behaviors that affect risk of falls    -  [place] fall precautions as indicated by assessment   - Educate patient/family on patient safety including physical limitations  - Instruct patient to call for assistance with activity based on assessment  - Modify environment to reduce risk of injury  - Consider OT/PT consult to assist with strengthening/mobility  Outcome: Progressing  Goal: Maintain or return to baseline ADL function  Description: INTERVENTIONS:  -  Assess patient's ability to carry out ADLs; assess patient's baseline for ADL function and identify physical deficits which impact ability to perform ADLs (bathing, care of mouth/teeth, toileting, grooming, dressing, etc )  - Assess/evaluate cause of self-care deficits   - Assess range of motion  - Assess patient's mobility; develop plan if impaired  - Assess patient's need for assistive devices and provide as appropriate  - Encourage maximum independence but intervene and supervise when necessary  - Involve family in performance of ADLs  - Assess for home care needs following discharge   - Consider OT consult to assist with ADL evaluation and planning for discharge  - Provide patient education as appropriate  Outcome: Progressing  Goal: Maintain or return mobility status to optimal level  Description: INTERVENTIONS:  - Assess patient's baseline mobility status (ambulation, transfers, stairs, etc )    - Identify cognitive and physical deficits and behaviors that affect mobility  - Identify mobility aids required to assist with transfers and/or ambulation (gait belt, sit-to-stand, lift, walker, cane, etc )  - [place] fall precautions as indicated by assessment  - Record patient progress and toleration of activity level on Mobility SBAR; progress patient to next Phase/Stage  - Instruct patient to call for assistance with activity based on assessment  - Consider rehabilitation consult to assist with strengthening/weightbearing, etc   Outcome: Progressing     Problem: DISCHARGE PLANNING  Goal: Discharge to home or other facility with appropriate resources  Description: INTERVENTIONS:  - Identify barriers to discharge w/patient and caregiver  - Arrange for needed discharge resources and transportation as appropriate  - Identify discharge learning needs (meds, wound care, etc )  - Arrange for interpretive services to assist at discharge as needed  - Refer to Case Management Department for coordinating discharge planning if the patient needs post-hospital services based on physician/advanced practitioner order or complex needs related to functional status, cognitive ability, or social support system  Outcome: Progressing

## 2021-05-01 NOTE — PHYSICAL THERAPY NOTE
PHYSICAL THERAPY EVALUATION  NAME:  Olivier Lind  DATE: 21    AGE:   42 y o  Mrn:   [id number]  ADMIT DX:  Head injury [S09 90XA]  Multiple contusions [T07  XXXA]    Past Medical History:   Diagnosis Date    Anemia     Anxiety     Chronic left-sided low back pain with left-sided sciatica 2020    Chronic pain     Chronic pain syndrome 2020    Depression     Diabetes [facility] [address] [facility]     Diabetes mellitus ([facility] )     no meds    Psychiatric disorder     Vegetarian        Past Surgical History:   Procedure Laterality Date    ANTERIOR / POSTERIOR COMBINED FUSION THORACIC SPINE      BACK SURGERY       SECTION      GASTRIC BYPASS      JORGE-EN-Y PROCEDURE          SALPINGECTOMY      TUBAL LIGATION         Length Of Stay: 0    PHYSICAL THERAPY EVALUATION:        21 [id number]   Note Type   Note type Evaluation   Pain Assessment   Pain Assessment Tool 0-10   Pain Score 5   Pain Location/Orientation Location: Head;Location: Back   Pain Onset/Description Onset: Ongoing;Frequency: Constant/Continuous; Descriptor: Aching   Effect of Pain on Daily Activities increased pain with activity    Patient's Stated Pain Goal No pain   Hospital Pain Intervention(s) Ambulation/increased activity;Repositioned   Home Living   Type of Home House  (lives on 2nd and 3rd floor of home)   Home Layout Multi-level;Stairs to enter with rails  ([address] full flight to bed/ bath )   Home Equipment   (None as per patient)   Additional Comments Patient reports living with chinmaye 15year-old child    Patient states her chinmay is able to be home and assist her as needed   Prior Function   Level of [place] Independent with ADLs and functional mobility   Lives With Significant other;Family   Receives Help From Family   ADL Assistance Independent   Falls in the last 6 months 1 to 4  (1; reason for this admission)   Comments Patient denies use of an assistive device for ambulation prior to admission Restrictions/Precautions   Weight Bearing Precautions Per Order No   Other Precautions Pain   General   Family/Caregiver Present No   Cognition   Overall Cognitive Status WFL   Arousal/Participation Alert   Orientation Level Oriented X4   Memory Within functional limits   Following Commands Follows all commands and directions without difficulty   RUE Assessment   RUE Assessment WFL   LUE Assessment   LUE Assessment WFL   RLE Assessment   RLE Assessment WFL   LLE Assessment   LLE Assessment WFL   Bed Mobility   Supine to Sit 5  Supervision   Additional items Increased time required   Transfers   Sit to Stand 5  Supervision   Additional items Increased time required   Stand to Sit 5  Supervision   Additional items Increased time required   Ambulation/Elevation   Gait pattern Foward flexed; Short stride   Gait Assistance 5  Supervision   Assistive Device None   Distance 200ft    Stair Management Assistance 5  Supervision   Stair Management Technique One rail R   Number of Stairs 10   Balance   Static Sitting Good   Static Standing Fair   Ambulatory Fair   Endurance Deficit   Endurance Deficit No   Activity Tolerance   Activity Tolerance Patient tolerated treatment well   Medical Staff Made Bess Hancock, OT    Nurse Made Aware Patient appropriate to be seen and mobilized per nursing   Assessment   Prognosis Good   Problem List Decreased mobility;Pain  (Photosensitivity)   Assessment Pt is 42 y o  female seen for PT evaluation s/p admit to [facility] on 4/30/2021  Two pt identifiers were used to confirm  Pt presented w/ fall from truck last Sunday  Patient had presented at the [address] on 4/26 and imaging was (-) for acute findings and pt was d/c home  Since D/c home pt has had increased HA and decreased appetite  Pt represented back to [facility] and was transferred to [facility] for further medical management/ evaluation  Pt was admitted with a primary dx of:  Cerebral contusion    PT now consulted for assessment of mobility and d/c needs  Pt with Up in chair orders  Pts current co morbidities affecting treatment include: Anemia, anxiety, chronic pain syndrome, depression, DM, psychiatric disorder  Pts current clinical presentation is Evolving (medium complexity) due to Ongoing medical management for primary dx, Decreased activity tolerance compared to baseline, Continuous pulse oximetry monitoring     Prior to admission, pt was independent with ambulation without the use of assistive device as per patient  Upon evaluation, pt currently is requiring Supervision for bed mobility; Supervision for transfers and Supervision for ambulation w/ no AD   Pt presents at PT eval functioning below baseline and currently w/ overall mobility deficits 2* to: pain, decreased activity tolerance compared to baseline  At conclusion of PT session pt returned back in chair with phone and call bell within reach  Pt denies any further questions at this time  PT is currently recommending home with support  D/C acute care PT at this time due to pt being supervision with all mobility and having supportive significant other who is able to assist as needed  Pt denies any mobility or safety concerns about returning home at d/c  Recommend pt continues to mobilize with nsg and restorative techs during hospital stay     Barriers to Discharge None   Barriers to Discharge Comments Patient denies any mobility or safety concerns about returning home at time of discharge   Goals   Patient Goals " to go home"   Plan   PT Frequency Other (Comment)  (D/C PT )   Recommendation   PT Discharge Recommendation No rehabilitation needs   Equipment Recommended   (none at this time )   PT - OK to Discharge Yes  (When medically cleared)   Additional Comments Patient denies any mobility or safety concerns about returning home at time of discharge   Tee 8 in Bed Without Bedrails 4   Lying on Back to Sitting on Edge of Flat Bed 4   Moving Bed to Chair 4   Standing Up From Chair 4   Walk in Room 4   Climb 3-5 Stairs 4   Basic Mobility Inpatient Raw Score 24   Basic Mobility Standardized Score 57 68   Modified Glades Scale   Modified Glades Scale 3   Barthel Index   Feeding 10   Bathing 5   Grooming Score 5   Dressing Score 10   Bladder Score 10   Bowels Score 10   Toilet Use Score 10   Transfers (Bed/Chair) Score 15   Mobility (Level Surface) Score 10   Stairs Score 5   Barthel Index Score 90   Portions of the documentation may have been created using voice recognition software  Occasional wrong word or sound alike substitutions may have occurred due to the inherent limitations of the voice recognition software  Read the chart carefully and recognize, using context, where substitutions have occurred      Srinivas Worley, PT, DPT

## 2021-05-01 NOTE — DISCHARGE SUMMARY
Discharge Summary - [facility] 42 y o  female MRN: [id number]    Unit/Bed#: [facility] 601-01 Encounter: [id number]    Admission Date:   Admission Orders (From admission, onward)     Ordered        04/30/21 1319  Place in Observation  Once                     Admitting Diagnosis: Head injury [S09 90XA]  Multiple contusions [T07  XXXA]    HPI: [facility] is a 42 y o  female who presents with headache  States she fell from her brothers truck on Sunday, was seen in ER and HCT - negative  Has felt worse since, no appetite in 3 days, Headaches uncontrolled, was taking ADvil  No complaints of chest pain, no shortness of breath  Complains of neck tenderness and right paraspinal tendrness  History of spinal surgery and Bypass     No nasuea or vomiting, no spinal tenderness in T or L spine region  Moves all four extremities        Procedures Performed: No orders of the defined types were placed in this encounter  Summary of Hospital Course: 43 y/o female who fell a few days ago presents to ER with increased headaches, repeat HCT demonstrates contusions  Admitted and Neurosurgery consulted  MEdrol dosepak administered after clearing with Neurosurgery  This morning no complaint of headache, ambulating in fraser and tolerating diet  Cleared by PT for home  Will follow up with Neurosurgery an dPCP  Significant Findings, Care, Treatment and Services Provided: Ct Head Wo Contrast    Result Date: 5/1/2021  Impression: Bifrontal hemorrhagic parenchymal contusions and associated edema are slightly increased compared with April 30, 2021  There may be a small amount of subdural hemorrhage along the anterior falx, measuring 1 8 mm maximal transverse thickness  Possible minimal subarachnoid hemorrhage  The images are available for clinical review  The study was marked in [facility] for immediate notification   Workstation performed: [id number]     Ct Head Without Contrast    Result Date: 4/30/2021  Impression: Inferior bifrontal lobe hemorrhagic contusions  I personally discussed this study with Mc Barrow on 4/30/2021 at 9:37 AM  Workstation performed: [id number]       Complications: none    Discharge Diagnosis: S/P Fall  Cerebral contusion  Left leg weakness  Neck pain    Resolved Problems  Date Reviewed: 5/1/2021    None          Condition at Discharge: stable         Discharge instructions/Information to patient and family:   See after visit summary for information provided to patient and family  Provisions for Follow-Up Care:  See after visit summary for information related to follow-up care and any pertinent home health orders  PCP: Alex Tamez MD    Disposition: Home    Planned Readmission: No      Discharge Statement   I spent 30 minutes discharging the patient  This time was spent on the day of discharge  I had direct contact with the patient on the day of discharge  Additional documentation is required if more than 30 minutes were spent on discharge  Discharge Medications:  See after visit summary for reconciled discharge medications provided to patient and family

## 2021-05-01 NOTE — PROGRESS NOTES
[place] Aqq  192 1980, 42 y o  female MRN: [id number]  Unit/Bed#: [facility] 601-01 Encounter: [id number]  Primary Care Provider: Jess Guajardo MD   Date and time admitted to hospital: 4/30/2021 11:29 AM    Left leg numbness  Assessment & Plan  No complaint of numbness this morning  Moving extremities  Neurosurgery following    Neck pain  Assessment & Plan  No complaints of neck pain this morning  Will continue to monitor  Pain regimen effective per patient    Cerebral edema [facility])  Assessment & Plan  See above    * Cerebral contusion [facility])  Assessment & Plan  Neurosurgery consulted and following  Headache relief this morning, Started Medrol dosepak last evening  Mild vertigo  GCS - 15        TERTIARY TRAUMA SURVEY NOTE    Prophylaxis: Sequential compression device (Venodyne)     Disposition: home    Code status:  Level 1 - Full Code    Consultants: Neurosurgery    Is the patient 65 years or older?: No          SUBJECTIVE:     Transfer from: home  Outside Films Received: no  Tertiary Exam Due on: 5/1/21    Mechanism of Injury:Fall    Details related to Injury: +LOC:  yes    Chief Complaint: headache    HPI/Last 24 hour events: Admitted to trauma and Neurosurgery consulted    Medrol dosepak initiated as well as pain regimen    Active medications:           Current Facility-Administered Medications:     acetaminophen (TYLENOL) tablet 975 mg, 975 mg, Oral, Q8H [facility], 975 mg at 05/01/21 0534    docusate sodium (COLACE) capsule 100 mg, 100 mg, Oral, BID    gabapentin (NEURONTIN) capsule 100 mg, 100 mg, Oral, TID, 100 mg at 04/30/21 2032    LORazepam (ATIVAN) tablet 1 mg, 1 mg, Oral, Q8H PRN, 1 mg at 04/30/21 2159    meclizine (ANTIVERT) tablet 12 5 mg, 12 5 mg, Oral, Q8H JOSE    methocarbamol (ROBAXIN) tablet 750 mg, 750 mg, Oral, Q6H JOSE, 750 mg at 05/01/21 0534    [COMPLETED] methylprednisolone (MEDROL) tablet 24 mg, 24 mg, Oral, Daily, 24 mg at 04/30/21 1518 **FOLLOWED BY** methylprednisolone (MEDROL) tablet 20 mg, 20 mg, Oral, Daily **FOLLOWED BY** [START ON 5/2/2021] methylPREDNISolone (MEDROL) tablet 16 mg, 16 mg, Oral, Daily **FOLLOWED BY** [START ON 5/3/2021] methylprednisolone (MEDROL) tablet 12 mg, 12 mg, Oral, Daily **FOLLOWED BY** [START ON 5/4/2021] methylprednisolone (MEDROL) tablet 8 mg, 8 mg, Oral, Daily **FOLLOWED BY** [START ON 5/5/2021] methylprednisolone (MEDROL) tablet 4 mg, 4 mg, Oral, Daily    ondansetron (ZOFRAN) injection 4 mg, 4 mg, Intravenous, Q6H PRN    oxyCODONE (ROXICODONE) IR tablet 5 mg, 5 mg, Oral, Q4H PRN, 5 mg at 05/01/21 0215    potassium chloride (K-DUR,KLOR-CON) CR tablet 20 mEq, 20 mEq, Oral, Once    potassium chloride 20 mEq IVPB (premix), 20 mEq, Intravenous, Once    senna (SENOKOT) tablet 17 2 mg, 2 tablet, Oral, Daily      OBJECTIVE:     Vitals:   Vitals:    05/01/21 0233   BP: 120/72   Pulse: 55   Resp: 16   Temp: (!) 100 6 °F (38 1 °C)   SpO2: 100%       Physical Exam:   GENERAL APPEARANCE: comfortable  NEURO: intact, GCS - 15  HEENT: EIOM's intact  CV: RRR< no complaint of chest pain  LUNGS: CTA bilaterally, no shortness of breath  GI: tolerating a diet  : voiding  MSK: moving all four extremities, strength equal bilaterally this morning, ecchymosis upper right thigh  SKIN: warm and dry    I/O:   I/O       04/29 0701 - 04/30 0700 04/30 0701 - 05/01 0700 05/01 0701 - 05/02 0700    P  O   240     I V   410     Total Intake  650     Net  +650            Unmeasured Urine Occurrence  1 x           Invasive Devices: Invasive Devices     Peripheral Intravenous Line            Peripheral IV 04/30/21 Right Forearm less than 1 day                  Imaging:   Ct Head Without Contrast    Result Date: 4/30/2021  Impression: Inferior bifrontal lobe hemorrhagic contusions     I personally discussed this study with Ova Cue on 4/30/2021 at 9:37 AM  Workstation performed: [id number]       Labs: Results for [place], Katherne Landau (MRN [id number]) as of 5/1/2021 07:30   Ref  Range 5/1/2021 04:29   Sodium Latest Ref Range: 136 - 145 mmol/L 143   Potassium Latest Ref Range: 3 5 - 5 3 mmol/L 3 1 (L)   Chloride Latest Ref Range: 100 - 108 mmol/L 111 (H)   CO2 Latest Ref Range: 21 - 32 mmol/L 24   Anion Gap Latest Ref Range: 4 - 13 mmol/L 8   BUN Latest Ref Range: 5 - 25 mg/dL 9   Creatinine Latest Ref Range: 0 60 - 1 30 mg/dL 0 50 (L)   Glucose, Random Latest Ref Range: 65 - 140 mg/dL 87   Calcium Latest Ref Range: 8 3 - 10 1 mg/dL 8 2 (L)   eGFR Latest Units: ml/min/1 73sq m 120   WBC Latest Ref Range: 4 31 - 10 16 Thousand/uL 11 12 (H)   Red Blood Cell Count Latest Ref Range: 3 81 - 5 12 Million/uL 3 53 (L)   Hemoglobin Latest Ref Range: 11 5 - 15 4 g/dL 10 6 (L)   HCT Latest Ref Range: 34 8 - 46 1 % 32 6 (L)   MCV Latest Ref Range: 82 - 98 fL 92   MCH Latest Ref Range: 26 8 - 34 3 pg 30 0   MCHC Latest Ref Range: 31 4 - 37 4 g/dL 32 5   RDW Latest Ref Range: 11 6 - 15 1 % 13 5   Platelet Count Latest Ref Range: 149 - 390 Thousands/uL 452 (H)   MPV Latest Ref Range: 8 9 - 12 7 fL 9 3     Potassium being repleted  , repeat am labs  CT Head -Bifrontal hemorrhagic parenchymal contusions and associated edema are slightly increased compared with April 30, 2021      There may be a small amount of subdural hemorrhage along the anterior falx, measuring 1 8 mm maximal transverse thickness      Possible minimal subarachnoid hemorrhage , will discuss with Neurosurgery  Did clear starting MEdrol dosepak yesterday with them    GCS - 15 and symptoms much improved

## 2021-05-03 ENCOUNTER — TELEPHONE (OUTPATIENT)
Dept: NEUROSURGERY | Facility: CLINIC | Age: 41
End: 2021-05-03

## 2021-05-03 NOTE — TELEPHONE ENCOUNTER
05/13/2021-CALLED PT (3rd ATTEMPT), PHONE KEPT RINGING AND NO ANSWER  HAVE BEEN UNSUCCESSFUL REACHING PT AND EMERGENCY CONTACT TO CONFIRM APT AND SCHEDULE CT PRIOR TO APT  05/07/2021-CALLED PT'S EMERGENCY CONTACT (FATHER-SHA) AGAIN AND LEFT MESSAGE ON MACHINE TO HAVE PT CALL OUR OFFICE  NEED TO CONFIRM 05/17/2021 APT IN [facility] AND TO SCHEDULE CT PRIOR TO APT  05/06/2021-CALLED PT 2x, PHONE IS DISCONNECTED  CALLED PT'S EMERGENCY CONTACT (NIKOEL) AND LEFT MESSAGE ON MACHINE TO HAVE PT CALL OUR OFFICE  NEED TO CONFIRM 05/17/2021 APT IN [facility] AND TO SCHEDULE CT PRIOR TO APT         05/03/2021-PT DISCHARGED TO HOME   05/17/2021-APT Lázaro Whitlock IN [facility] W/CT HEAD      ----- Message from Suzanne Buckley PA-C sent at 5/1/2021  9:44 AM EDT -----  Regarding: hospital follow up  Please schedule a 2 week follow up with CT head prior, AP solo appointment  Thanks!

## 2021-05-17 NOTE — TELEPHONE ENCOUNTER
5/17/21   NO SHOW  PATIENTS MOBILE NUMBER IS DISCONNECTED  THERE IS NO COMMUNICATION CONSENT ON FILE      BECAUSE OF THIS I DID NOT LEAVE VM ON THE FATHER OR SIGNIFANTS OTHERS NUMBERS - THE MESSAGES WERE AUTOMATED SO THERE WAS NO NAME CONFIRMATION   **NON PAR WITH INSURANCE [place] [employer]**  1X HOSPITAL FOLLOW UP VISIT WITH IMAGING WHICH AS OF TODAY HAS NOT BE DONE**

## 2021-07-16 ENCOUNTER — HOSPITAL ENCOUNTER (EMERGENCY)
Facility: HOSPITAL | Age: 41
Discharge: HOME | End: 2021-07-16
Attending: EMERGENCY MEDICINE
Payer: COMMERCIAL

## 2021-07-16 ENCOUNTER — APPOINTMENT (EMERGENCY)
Dept: RADIOLOGY | Facility: HOSPITAL | Age: 41
End: 2021-07-16
Attending: EMERGENCY MEDICINE
Payer: COMMERCIAL

## 2021-07-16 VITALS
DIASTOLIC BLOOD PRESSURE: 67 MMHG | RESPIRATION RATE: 16 BRPM | TEMPERATURE: 96.7 F | SYSTOLIC BLOOD PRESSURE: 112 MMHG | OXYGEN SATURATION: 99 % | HEART RATE: 56 BPM

## 2021-07-16 DIAGNOSIS — E86.0 DEHYDRATION: ICD-10-CM

## 2021-07-16 DIAGNOSIS — R55 SYNCOPE: Primary | ICD-10-CM

## 2021-07-16 LAB
ALBUMIN SERPL BCP-MCNC: 3.4 G/DL (ref 3.5–5)
ALP SERPL-CCNC: 87 U/L (ref 46–116)
ALT SERPL W P-5'-P-CCNC: 38 U/L (ref 12–78)
AMPHETAMINES SERPL QL SCN: NEGATIVE
ANION GAP SERPL CALCULATED.3IONS-SCNC: 11 MMOL/L (ref 4–13)
AST SERPL W P-5'-P-CCNC: 20 U/L (ref 5–45)
BACTERIA UR QL AUTO: ABNORMAL /HPF
BARBITURATES UR QL: NEGATIVE
BASOPHILS # BLD AUTO: 0.03 THOUSANDS/ΜL (ref 0–0.1)
BASOPHILS NFR BLD AUTO: 0 % (ref 0–1)
BENZODIAZ UR QL: NEGATIVE
BILIRUB SERPL-MCNC: 0.1 MG/DL (ref 0.2–1)
BILIRUB UR QL STRIP: NEGATIVE
BUN SERPL-MCNC: 12 MG/DL (ref 5–25)
CALCIUM ALBUM COR SERPL-MCNC: 8.2 MG/DL (ref 8.3–10.1)
CALCIUM SERPL-MCNC: 7.7 MG/DL (ref 8.3–10.1)
CHLORIDE SERPL-SCNC: 107 MMOL/L (ref 100–108)
CLARITY UR: CLEAR
CO2 SERPL-SCNC: 25 MMOL/L (ref 21–32)
COCAINE UR QL: NEGATIVE
COLOR UR: YELLOW
CREAT SERPL-MCNC: 0.7 MG/DL (ref 0.6–1.3)
EOSINOPHIL # BLD AUTO: 0.1 THOUSAND/ΜL (ref 0–0.61)
EOSINOPHIL NFR BLD AUTO: 1 % (ref 0–6)
ERYTHROCYTE [DISTWIDTH] IN BLOOD BY AUTOMATED COUNT: 15.4 % (ref 11.6–15.1)
ETHANOL SERPL-MCNC: 34 MG/DL (ref 0–3)
EXT PREG TEST URINE: NEGATIVE
EXT. CONTROL ED NAV: NORMAL
GFR SERPL CREATININE-BSD FRML MDRD: 107 ML/MIN/1.73SQ M
GLUCOSE SERPL-MCNC: 145 MG/DL (ref 65–140)
GLUCOSE UR STRIP-MCNC: NEGATIVE MG/DL
HCT VFR BLD AUTO: 31.2 % (ref 34.8–46.1)
HGB BLD-MCNC: 9.5 G/DL (ref 11.5–15.4)
HGB UR QL STRIP.AUTO: ABNORMAL
IMM GRANULOCYTES # BLD AUTO: 0.01 THOUSAND/UL (ref 0–0.2)
IMM GRANULOCYTES NFR BLD AUTO: 0 % (ref 0–2)
KETONES UR STRIP-MCNC: NEGATIVE MG/DL
LEUKOCYTE ESTERASE UR QL STRIP: NEGATIVE
LYMPHOCYTES # BLD AUTO: 2.36 THOUSANDS/ΜL (ref 0.6–4.47)
LYMPHOCYTES NFR BLD AUTO: 34 % (ref 14–44)
MAGNESIUM SERPL-MCNC: 2.4 MG/DL (ref 1.6–2.6)
MCH RBC QN AUTO: 27.5 PG (ref 26.8–34.3)
MCHC RBC AUTO-ENTMCNC: 30.4 G/DL (ref 31.4–37.4)
MCV RBC AUTO: 90 FL (ref 82–98)
METHADONE UR QL: NEGATIVE
MONOCYTES # BLD AUTO: 0.33 THOUSAND/ΜL (ref 0.17–1.22)
MONOCYTES NFR BLD AUTO: 5 % (ref 4–12)
NEUTROPHILS # BLD AUTO: 4.21 THOUSANDS/ΜL (ref 1.85–7.62)
NEUTS SEG NFR BLD AUTO: 60 % (ref 43–75)
NITRITE UR QL STRIP: NEGATIVE
NON-SQ EPI CELLS URNS QL MICRO: ABNORMAL /HPF
NRBC BLD AUTO-RTO: 0 /100 WBCS
OPIATES UR QL SCN: POSITIVE
OXYCODONE+OXYMORPHONE UR QL SCN: NEGATIVE
PCP UR QL: NEGATIVE
PH UR STRIP.AUTO: 5.5 [PH]
PHOSPHATE SERPL-MCNC: 3 MG/DL (ref 2.7–4.5)
PLATELET # BLD AUTO: 456 THOUSANDS/UL (ref 149–390)
PMV BLD AUTO: 9 FL (ref 8.9–12.7)
POTASSIUM SERPL-SCNC: 3.5 MMOL/L (ref 3.5–5.3)
PROT SERPL-MCNC: 6.5 G/DL (ref 6.4–8.2)
PROT UR STRIP-MCNC: NEGATIVE MG/DL
RBC # BLD AUTO: 3.45 MILLION/UL (ref 3.81–5.12)
RBC #/AREA URNS AUTO: ABNORMAL /HPF
SODIUM SERPL-SCNC: 143 MMOL/L (ref 136–145)
SP GR UR STRIP.AUTO: 1.02 (ref 1–1.03)
THC UR QL: POSITIVE
TROPONIN I SERPL-MCNC: <0.02 NG/ML
UROBILINOGEN UR QL STRIP.AUTO: 0.2 E.U./DL
WBC # BLD AUTO: 7.04 THOUSAND/UL (ref 4.31–10.16)
WBC #/AREA URNS AUTO: ABNORMAL /HPF

## 2021-07-16 PROCEDURE — 80053 COMPREHEN METABOLIC PANEL: CPT | Performed by: EMERGENCY MEDICINE

## 2021-07-16 PROCEDURE — 99285 EMERGENCY DEPT VISIT HI MDM: CPT | Performed by: EMERGENCY MEDICINE

## 2021-07-16 PROCEDURE — 93005 ELECTROCARDIOGRAM TRACING: CPT

## 2021-07-16 PROCEDURE — 82077 ASSAY SPEC XCP UR&BREATH IA: CPT | Performed by: EMERGENCY MEDICINE

## 2021-07-16 PROCEDURE — 84484 ASSAY OF TROPONIN QUANT: CPT | Performed by: EMERGENCY MEDICINE

## 2021-07-16 PROCEDURE — 71045 X-RAY EXAM CHEST 1 VIEW: CPT

## 2021-07-16 PROCEDURE — 96360 HYDRATION IV INFUSION INIT: CPT

## 2021-07-16 PROCEDURE — 81025 URINE PREGNANCY TEST: CPT | Performed by: EMERGENCY MEDICINE

## 2021-07-16 PROCEDURE — 85025 COMPLETE CBC W/AUTO DIFF WBC: CPT | Performed by: EMERGENCY MEDICINE

## 2021-07-16 PROCEDURE — 80307 DRUG TEST PRSMV CHEM ANLYZR: CPT | Performed by: EMERGENCY MEDICINE

## 2021-07-16 PROCEDURE — 84100 ASSAY OF PHOSPHORUS: CPT | Performed by: EMERGENCY MEDICINE

## 2021-07-16 PROCEDURE — 99284 EMERGENCY DEPT VISIT MOD MDM: CPT

## 2021-07-16 PROCEDURE — 83735 ASSAY OF MAGNESIUM: CPT | Performed by: EMERGENCY MEDICINE

## 2021-07-16 PROCEDURE — 81001 URINALYSIS AUTO W/SCOPE: CPT | Performed by: EMERGENCY MEDICINE

## 2021-07-16 PROCEDURE — 36415 COLL VENOUS BLD VENIPUNCTURE: CPT | Performed by: EMERGENCY MEDICINE

## 2021-07-16 PROCEDURE — 96361 HYDRATE IV INFUSION ADD-ON: CPT

## 2021-07-16 RX ORDER — ACETAMINOPHEN AND CODEINE PHOSPHATE 300; 30 MG/1; MG/1
1 TABLET ORAL EVERY 4 HOURS PRN
COMMUNITY

## 2021-07-16 RX ADMIN — SODIUM CHLORIDE 1000 ML: 0.9 INJECTION, SOLUTION INTRAVENOUS at 20:20

## 2021-07-16 RX ADMIN — SODIUM CHLORIDE 1000 ML: 0.9 INJECTION, SOLUTION INTRAVENOUS at 21:31

## 2021-07-17 NOTE — ED PROVIDER NOTES
History  Chief Complaint   Patient presents with    Syncope     brought in by boyfriend who states pt keeps passing out on him since last night  patient awake but speech is slurred as if on something  admits to some alcohol a few hours ago     Patient status post bariatric surgery and has lost 135 lb  She states she has not been eating very well for some time  She mostly drinks iced tea for hydration  Since yesterday the patient has had episodes of lightheadedness associated with generalized weakness  Symptoms are worse when she gets up from the seated position  Patient has recently been on a codeine medication for toothache and admits to drinking alcohol  She has had no fever chills  No head injury  Patient arrives awake but sonmulent, is able to answer questions appropriate  Prior to Admission Medications   Prescriptions Last Dose Informant Patient Reported? Taking?    FLUoxetine (PROzac) 40 MG capsule Not Taking at Unknown time Self Yes No   Sig: Take 80 mg by mouth daily    Patient not taking: Reported on 7/16/2021   LORazepam (ATIVAN) 0 5 mg tablet  Self Yes No   Sig: Take 1 mg by mouth every 8 (eight) hours as needed for anxiety    Melatonin-Pyridoxine (MELATIN PO)   Yes No   Sig: Take 3 mg by mouth daily at bedtime   OLANZapine (ZyPREXA) 2 5 mg tablet Not Taking at Unknown time  Yes No   Sig: Take 2 5 mg by mouth daily at bedtime   Patient not taking: Reported on 7/16/2021   acetaminophen (TYLENOL) 325 mg tablet Not Taking at Unknown time  No No   Sig: Take 3 tablets (975 mg total) by mouth every 8 (eight) hours   Patient not taking: Reported on 7/16/2021   acetaminophen-codeine (TYLENOL #3) 300-30 mg per tablet   Yes Yes   Sig: Take 1 tablet by mouth every 4 (four) hours as needed for moderate pain   b complex vitamins capsule   Yes No   Sig: Take 1 capsule by mouth daily   buprenorphine (SUBUTEX) 2 mg Not Taking at Unknown time  Yes No   Sig: Place 8 mg under the tongue 2 (two) times a day Patient not taking: Reported on 2021   busPIRone (BUSPAR) 15 mg tablet   Yes No   Sig: Take 20 mg by mouth 3 (three) times a day    furosemide (LASIX) 20 mg tablet   Yes No   Sig: Take 20 mg by mouth daily   gabapentin (NEURONTIN) 800 mg tablet  Self Yes No   Sig: Take 1,200 mg by mouth 3 (three) times a day    lithium carbonate 150 mg capsule Not Taking at Unknown time  Yes No   Sig: Take 450 mg by mouth daily at bedtime   Patient not taking: Reported on 2021   meclizine (ANTIVERT) 12 5 MG tablet   No No   Sig: Take 1 tablet (12 5 mg total) by mouth every 8 (eight) hours   methocarbamol (ROBAXIN) 750 mg tablet   No No   Sig: Take 1 tablet (750 mg total) by mouth every 6 (six) hours for 25 doses   methylPREDNISolone 4 MG tablet therapy pack   No No   Sig: Use as directed on package   nicotine (NICODERM CQ) 21 mg/24 hr TD 24 hr patch Not Taking at Unknown time  Yes No   Sig: Place 1 patch on the skin every 24 hours   Patient not taking: Reported on 2021   omeprazole (PriLOSEC) 40 MG capsule   Yes No   Sig: Take 40 mg by mouth daily   propranolol (INDERAL) 40 mg tablet Past Month at Unknown time  Yes Yes   Sig: Take 40 mg by mouth 2 (two) times a day      Facility-Administered Medications: None       Past Medical History:   Diagnosis Date    Anemia     Anxiety     Chronic left-sided low back pain with left-sided sciatica 2020    Chronic pain     Chronic pain syndrome 2020    Depression     Diabetes [facility] [address] [facility]     Diabetes mellitus ([facility] [address] [facility]     no meds    Psychiatric disorder     Vegetarian        Past Surgical History:   Procedure Laterality Date    ANTERIOR / POSTERIOR COMBINED FUSION THORACIC SPINE      BACK SURGERY       SECTION      GASTRIC BYPASS      JORGE-EN-Y PROCEDURE      2012    SALPINGECTOMY      TUBAL LIGATION         Family History   Problem Relation Age of Onset    No Known Problems Mother     No Known Problems Father     Kidney disease Maternal Grandfather I have reviewed and agree with the history as documented  E-Cigarette/Vaping    E-Cigarette Use Current Every Day User      E-Cigarette/Vaping Substances    Nicotine No     THC No     CBD No     Flavoring No     Other No     Unknown No      Social History     Tobacco Use    Smoking status: Former Smoker     Packs/day: 0 50     Types: Cigarettes    Smokeless tobacco: Never Used   Vaping Use    Vaping Use: Every day   Substance Use Topics    Alcohol use: Yes     Comment: social    Drug use: Yes     Types: Marijuana     Comment: med  Leander Candle  for pain       Review of Systems   Constitutional: Positive for appetite change  Negative for chills and fever  HENT: Positive for dental problem  Negative for congestion and sore throat  Eyes: Negative for visual disturbance  Respiratory: Negative for cough and shortness of breath  Cardiovascular: Negative for chest pain  Gastrointestinal: Positive for nausea  Negative for abdominal pain  Genitourinary: Negative for dysuria  Musculoskeletal: Negative for back pain and neck pain  Skin: Negative for rash and wound  Neurological: Positive for dizziness, syncope, weakness and light-headedness  Psychiatric/Behavioral: Positive for sleep disturbance  Negative for confusion  All other systems reviewed and are negative  Physical Exam  Physical Exam  Vitals and nursing note reviewed  Constitutional:       Appearance: Normal appearance  HENT:      Head: Normocephalic  Right Ear: External ear normal       Left Ear: External ear normal       Nose: Nose normal       Mouth/Throat:      Pharynx: Oropharynx is clear  Eyes:      Conjunctiva/sclera: Conjunctivae normal    Cardiovascular:      Rate and Rhythm: Normal rate and regular rhythm  Pulses: Normal pulses  Pulmonary:      Effort: Pulmonary effort is normal       Breath sounds: Normal breath sounds  Abdominal:      Palpations: Abdomen is soft  Tenderness:  There is no abdominal tenderness  Musculoskeletal:         General: No tenderness  Normal range of motion  Cervical back: Normal range of motion and neck supple  No tenderness  Right lower leg: No edema  Left lower leg: No edema  Skin:     General: Skin is warm  Capillary Refill: Capillary refill takes less than 2 seconds  Neurological:      General: No focal deficit present  Mental Status: She is alert and oriented to person, place, and time  Psychiatric:         Behavior: Behavior normal       Comments: Patient is sleepy on arrival but awake and able to answer questions appropriately         Vital Signs  ED Triage Vitals [07/16/21 1949]   Temperature Pulse Respirations Blood Pressure SpO2   (!) 96 7 °F (35 9 °C) 78 16 (!) 64/43 96 %      Temp Source Heart Rate Source Patient Position - Orthostatic VS BP Location FiO2 (%)   Tympanic Monitor Sitting Left arm --      Pain Score       6           Vitals:    07/16/21 2145 07/16/21 2200 07/16/21 2215 07/16/21 2245   BP: 102/58 103/63 111/65 112/67   Pulse: 56 58 58 56   Patient Position - Orthostatic VS:             Visual Acuity      ED Medications  Medications   sodium chloride 0 9 % bolus 1,000 mL (0 mL Intravenous Stopped 7/16/21 2132)   sodium chloride 0 9 % bolus 1,000 mL (0 mL Intravenous Stopped 7/16/21 2259)       Diagnostic Studies  Results Reviewed     Procedure Component Value Units Date/Time    Rapid drug screen, urine [[id number]]  (Abnormal) Collected: 07/16/21 2232    Lab Status: Final result Specimen: Urine, Clean Catch Updated: 07/16/21 2301     Amph/Meth UR Negative     Barbiturate Ur Negative     Benzodiazepine Urine Negative     Cocaine Urine Negative     Methadone Urine Negative     Opiate Urine Positive     PCP Ur Negative     THC Urine Positive     Oxycodone Urine Negative    Narrative:      Presumptive report  If requested, specimen will be sent to reference lab for confirmation  FOR MEDICAL PURPOSES ONLY     IF CONFIRMATION NEEDED PLEASE CONTACT THE LAB WITHIN 5 DAYS      Drug Screen Cutoff Levels:  AMPHETAMINE/METHAMPHETAMINES  1000 ng/mL  BARBITURATES     200 ng/mL  BENZODIAZEPINES     200 ng/mL  COCAINE      300 ng/mL  METHADONE      300 ng/mL  OPIATES      300 ng/mL  PHENCYCLIDINE     25 ng/mL  THC       50 ng/mL  OXYCODONE      100 ng/mL    Urine Microscopic [[id number]]  (Abnormal) Collected: 07/16/21 2232    Lab Status: Final result Specimen: Urine, Clean Catch Updated: 07/16/21 2259     RBC, UA 4-10 /hpf      WBC, UA 2-4 /hpf      Epithelial Cells Occasional /hpf      Bacteria, UA Occasional /hpf     UA (URINE) with reflex to Scope [[id number]]  (Abnormal) Collected: 07/16/21 2232    Lab Status: Final result Specimen: Urine, Clean Catch Updated: 07/16/21 2249     Color, UA Yellow     Clarity, UA Clear     Specific Raymond, UA 1 020     pH, UA 5 5     Leukocytes, UA Negative     Nitrite, UA Negative     Protein, UA Negative mg/dl      Glucose, UA Negative mg/dl      Ketones, UA Negative mg/dl      Urobilinogen, UA 0 2 E U /dl      Bilirubin, UA Negative     Blood, UA Large    POCT pregnancy, urine [[id number]]  (Normal) Resulted: 07/16/21 2235    Lab Status: Final result Updated: 07/16/21 2235     EXT PREG TEST UR (Ref: Negative) negative     Control valid    Troponin I [[id number]]  (Normal) Collected: 07/16/21 2020    Lab Status: Final result Specimen: Blood from Arm, Right Updated: 07/16/21 2048     Troponin I <0 02 ng/mL     Comprehensive metabolic panel [[id number]]  (Abnormal) Collected: 07/16/21 2020    Lab Status: Final result Specimen: Blood from Arm, Right Updated: 07/16/21 2045     Sodium 143 mmol/L      Potassium 3 5 mmol/L      Chloride 107 mmol/L      CO2 25 mmol/L      ANION GAP 11 mmol/L      BUN 12 mg/dL      Creatinine 0 70 mg/dL      Glucose 145 mg/dL      Calcium 7 7 mg/dL      Corrected Calcium 8 2 mg/dL      AST 20 U/L      ALT 38 U/L      Alkaline Phosphatase 87 U/L      Total Protein 6 5 g/dL      Albumin 3 4 g/dL      Total Bilirubin 0 10 mg/dL      eGFR 107 ml/min/1 73sq m     Narrative:      [place] guidelines for Chronic Kidney Disease (CKD):     Stage 1 with normal or high GFR (GFR > 90 mL/min/1 73 square meters)    Stage 2 Mild CKD (GFR = 60-89 mL/min/1 73 square meters)    Stage 3A Moderate CKD (GFR = 45-59 mL/min/1 73 square meters)    Stage 3B Moderate CKD (GFR = 30-44 mL/min/1 73 square meters)    Stage 4 Severe CKD (GFR = 15-29 mL/min/1 73 square meters)    Stage 5 End Stage CKD (GFR <15 mL/min/1 73 square meters)  Note: GFR calculation is accurate only with a steady state creatinine    Phosphorus [[id number]]  (Normal) Collected: 07/16/21 2020    Lab Status: Final result Specimen: Blood from Arm, Right Updated: 07/16/21 2044     Phosphorus 3 0 mg/dL     Magnesium [[id number]]  (Normal) Collected: 07/16/21 2020    Lab Status: Final result Specimen: Blood from Arm, Right Updated: 07/16/21 2044     Magnesium 2 4 mg/dL     Ethanol [[id number]]  (Abnormal) Collected: 07/16/21 2020    Lab Status: Final result Specimen: Blood from Arm, Right Updated: 07/16/21 2041     Ethanol Lvl 34 mg/dL     CBC and differential [[id number]]  (Abnormal) Collected: 07/16/21 2020    Lab Status: Final result Specimen: Blood from Arm, Right Updated: 07/16/21 2026     WBC 7 04 Thousand/uL      RBC 3 45 Million/uL      Hemoglobin 9 5 g/dL      Hematocrit 31 2 %      MCV 90 fL      MCH 27 5 pg      MCHC 30 4 g/dL      RDW 15 4 %      MPV 9 0 fL      Platelets 867 Thousands/uL      nRBC 0 /100 WBCs      Neutrophils Relative 60 %      Immat GRANS % 0 %      Lymphocytes Relative 34 %      Monocytes Relative 5 %      Eosinophils Relative 1 %      Basophils Relative 0 %      Neutrophils Absolute 4 21 Thousands/µL      Immature Grans Absolute 0 01 Thousand/uL      Lymphocytes Absolute 2 36 Thousands/µL      Monocytes Absolute 0 33 Thousand/µL      Eosinophils Absolute 0 10 Thousand/µL      Basophils Absolute 0 03 Thousands/µL                  XR chest 1 view portable   Final Result by Georgiana Cowan MD (07/17 0653)      No acute cardiopulmonary disease  Workstation performed: [id number]                    Procedures  ECG 12 Lead Documentation Only    Date/Time: 7/16/2021 7:53 PM  Performed by: Merlin Pulse, MD  Authorized by: Merlin Pulse, MD     Indications / Diagnosis:  Weakness near syncope  ECG reviewed by me, the ED Provider: yes    Patient location:  ED  Interpretation:     Interpretation: normal    Rate:     ECG rate:  71    ECG rate assessment: normal    Rhythm:     Rhythm: sinus rhythm    Ectopy:     Ectopy: none    QRS:     QRS axis:  Normal    QRS intervals:  Normal  Conduction:     Conduction: normal    ST segments:     ST segments:  Normal  T waves:     T waves: normal               ED Course                                           MDM  Number of Diagnoses or Management Options  Diagnosis management comments: Patient is using alcohol narcotics which may explain her symptoms  She has had a significant weight loss and may be easily dehydrated, anemic or have other metabolic concern  Will check metabolic profile      Disposition  Final diagnoses:   Syncope   Dehydration     Time reflects when diagnosis was documented in both MDM as applicable and the Disposition within this note     Time User Action Codes Description Comment    7/16/2021 10:16 PM Syed ROMO Add [R55] Syncope     7/16/2021 10:16 PM Favio Art Add [E86 0] Dehydration       ED Disposition     ED Disposition Condition Date/Time Comment    Discharge Stable Fri Jul 16, 2021 10:16 PM [facility] discharge to home/self care              Follow-up Information     Follow up With Specialties Details Why Flower Watson MD  In 1 week  [facility] [id number] [facility]  [phone]            Discharge Medication List as of 7/16/2021 10:51 PM      CONTINUE these medications which have NOT CHANGED    Details   acetaminophen-codeine (TYLENOL #3) 300-30 mg per tablet Take 1 tablet by mouth every 4 (four) hours as needed for moderate pain, Historical Med      propranolol (INDERAL) 40 mg tablet Take 40 mg by mouth 2 (two) times a day, Historical Med      acetaminophen (TYLENOL) 325 mg tablet Take 3 tablets (975 mg total) by mouth every 8 (eight) hours, Starting Sat 5/1/2021, Normal      b complex vitamins capsule Take 1 capsule by mouth daily, Historical Med      buprenorphine (SUBUTEX) 2 mg Place 8 mg under the tongue 2 (two) times a day, Historical Med      busPIRone (BUSPAR) 15 mg tablet Take 20 mg by mouth 3 (three) times a day , Historical Med      FLUoxetine (PROzac) 40 MG capsule Take 80 mg by mouth daily , Historical Med      furosemide (LASIX) 20 mg tablet Take 20 mg by mouth daily, Historical Med      gabapentin (NEURONTIN) 800 mg tablet Take 1,200 mg by mouth 3 (three) times a day , Historical Med      lithium carbonate 150 mg capsule Take 450 mg by mouth daily at bedtime, Historical Med      LORazepam (ATIVAN) 0 5 mg tablet Take 1 mg by mouth every 8 (eight) hours as needed for anxiety , Historical Med      meclizine (ANTIVERT) 12 5 MG tablet Take 1 tablet (12 5 mg total) by mouth every 8 (eight) hours, Starting Sat 5/1/2021, Normal      Melatonin-Pyridoxine (MELATIN PO) Take 3 mg by mouth daily at bedtime, Historical Med      methocarbamol (ROBAXIN) 750 mg tablet Take 1 tablet (750 mg total) by mouth every 6 (six) hours for 25 doses, Starting Sat 5/1/2021, Until Sat 5/8/2021, Normal      methylPREDNISolone 4 MG tablet therapy pack Use as directed on package, Normal      nicotine (NICODERM CQ) 21 mg/24 hr TD 24 hr patch Place 1 patch on the skin every 24 hours, Historical Med      OLANZapine (ZyPREXA) 2 5 mg tablet Take 2 5 mg by mouth daily at bedtime, Historical Med      omeprazole (PriLOSEC) 40 MG capsule Take 40 mg by mouth daily, Historical Med           No discharge procedures on file      PDMP Review       Value Time User    PDMP Reviewed  Yes 5/1/2021 10:12 AM [id number]          ED Provider  Electronically Signed by           Jude Rico MD  07/20/21 8057

## 2021-07-19 LAB
ATRIAL RATE: 71 BPM
P AXIS: 66 DEGREES
PR INTERVAL: 162 MS
QRS AXIS: 70 DEGREES
QRSD INTERVAL: 84 MS
QT INTERVAL: 432 MS
QTC INTERVAL: 469 MS
T WAVE AXIS: 43 DEGREES
VENTRICULAR RATE: 71 BPM

## 2021-07-19 PROCEDURE — 93010 ELECTROCARDIOGRAM REPORT: CPT | Performed by: INTERNAL MEDICINE

## 2022-05-09 NOTE — ED NOTES
Patient standing in doorway, 1:1 at close proximity      Zachary Espinosa RN  02/27/20 7372 Bexarotene Counseling:  I discussed with the patient the risks of bexarotene including but not limited to hair loss, dry lips/skin/eyes, liver abnormalities, hyperlipidemia, pancreatitis, depression/suicidal ideation, photosensitivity, drug rash/allergic reactions, hypothyroidism, anemia, leukopenia, infection, cataracts, and teratogenicity.  Patient understands that they will need regular blood tests to check lipid profile, liver function tests, white blood cell count, thyroid function tests and pregnancy test if applicable.

## 2022-06-29 NOTE — ED NOTES
Patient informed nurse that she takes only 600 mg Gabapentin BID  Updated in patient's medication management and cancelled order in chart to reflect correct dose        202 Alexia Galvaiz, RN  02/27/20 8061 Fully vaccinated; Denies recent international travel, recent illness and sick contact with COVID in the last 3 weeks

## 2022-07-01 ENCOUNTER — APPOINTMENT (EMERGENCY)
Dept: RADIOLOGY | Facility: HOSPITAL | Age: 42
End: 2022-07-01
Payer: COMMERCIAL

## 2022-07-01 ENCOUNTER — HOSPITAL ENCOUNTER (EMERGENCY)
Facility: HOSPITAL | Age: 42
Discharge: HOME | End: 2022-07-02
Attending: EMERGENCY MEDICINE | Admitting: EMERGENCY MEDICINE
Payer: COMMERCIAL
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
## 2022-07-01 LAB
ALBUMIN SERPL BCP-MCNC: 3.7 G/DL (ref 3.5–5)
ALP SERPL-CCNC: 76 U/L (ref 46–116)
ALT SERPL W P-5'-P-CCNC: 33 U/L (ref 12–78)
ANION GAP SERPL CALCULATED.3IONS-SCNC: 11 MMOL/L (ref 4–13)
APAP SERPL-MCNC: <2 UG/ML (ref 10–20)
AST SERPL W P-5'-P-CCNC: 28 U/L (ref 5–45)
BASOPHILS # BLD AUTO: 0.04 THOUSANDS/ΜL (ref 0–0.1)
BASOPHILS NFR BLD AUTO: 1 % (ref 0–1)
BILIRUB SERPL-MCNC: 0.24 MG/DL (ref 0.2–1)
BUN SERPL-MCNC: 10 MG/DL (ref 5–25)
CALCIUM SERPL-MCNC: 8.8 MG/DL (ref 8.3–10.1)
CHLORIDE SERPL-SCNC: 105 MMOL/L (ref 100–108)
CK MB SERPL-MCNC: 1.5 % (ref 0–2.5)
CK MB SERPL-MCNC: 6 NG/ML (ref 0–5)
CK SERPL-CCNC: 398 U/L (ref 26–192)
CO2 SERPL-SCNC: 28 MMOL/L (ref 21–32)
CREAT SERPL-MCNC: 0.7 MG/DL (ref 0.6–1.3)
EOSINOPHIL # BLD AUTO: 0.15 THOUSAND/ΜL (ref 0–0.61)
EOSINOPHIL NFR BLD AUTO: 2 % (ref 0–6)
ERYTHROCYTE [DISTWIDTH] IN BLOOD BY AUTOMATED COUNT: 13.5 % (ref 11.6–15.1)
ETHANOL SERPL-MCNC: <3 MG/DL (ref 0–3)
GFR SERPL CREATININE-BSD FRML MDRD: 106 ML/MIN/1.73SQ M
GLUCOSE SERPL-MCNC: 100 MG/DL (ref 65–140)
HCT VFR BLD AUTO: 38.6 % (ref 34.8–46.1)
HGB BLD-MCNC: 12.5 G/DL (ref 11.5–15.4)
IMM GRANULOCYTES # BLD AUTO: 0.02 THOUSAND/UL (ref 0–0.2)
IMM GRANULOCYTES NFR BLD AUTO: 0 % (ref 0–2)
LIPASE SERPL-CCNC: 33 U/L (ref 73–393)
LYMPHOCYTES # BLD AUTO: 1.47 THOUSANDS/ΜL (ref 0.6–4.47)
LYMPHOCYTES NFR BLD AUTO: 22 % (ref 14–44)
MCH RBC QN AUTO: 30.9 PG (ref 26.8–34.3)
MCHC RBC AUTO-ENTMCNC: 32.4 G/DL (ref 31.4–37.4)
MCV RBC AUTO: 96 FL (ref 82–98)
MONOCYTES # BLD AUTO: 0.43 THOUSAND/ΜL (ref 0.17–1.22)
MONOCYTES NFR BLD AUTO: 7 % (ref 4–12)
NEUTROPHILS # BLD AUTO: 4.5 THOUSANDS/ΜL (ref 1.85–7.62)
NEUTS SEG NFR BLD AUTO: 68 % (ref 43–75)
NRBC BLD AUTO-RTO: 0 /100 WBCS
PLATELET # BLD AUTO: 330 THOUSANDS/UL (ref 149–390)
PMV BLD AUTO: 9 FL (ref 8.9–12.7)
POTASSIUM SERPL-SCNC: 3.7 MMOL/L (ref 3.5–5.3)
PROT SERPL-MCNC: 7 G/DL (ref 6.4–8.2)
RBC # BLD AUTO: 4.04 MILLION/UL (ref 3.81–5.12)
SALICYLATES SERPL-MCNC: 4.9 MG/DL (ref 3–20)
SODIUM SERPL-SCNC: 144 MMOL/L (ref 136–145)
WBC # BLD AUTO: 6.61 THOUSAND/UL (ref 4.31–10.16)

## 2022-07-01 PROCEDURE — G1004 CDSM NDSC: HCPCS

## 2022-07-01 PROCEDURE — 82077 ASSAY SPEC XCP UR&BREATH IA: CPT | Performed by: EMERGENCY MEDICINE

## 2022-07-01 PROCEDURE — 99284 EMERGENCY DEPT VISIT MOD MDM: CPT | Performed by: EMERGENCY MEDICINE

## 2022-07-01 PROCEDURE — 80053 COMPREHEN METABOLIC PANEL: CPT | Performed by: EMERGENCY MEDICINE

## 2022-07-01 PROCEDURE — 73552 X-RAY EXAM OF FEMUR 2/>: CPT

## 2022-07-01 PROCEDURE — 72125 CT NECK SPINE W/O DYE: CPT

## 2022-07-01 PROCEDURE — 70450 CT HEAD/BRAIN W/O DYE: CPT

## 2022-07-01 PROCEDURE — 83690 ASSAY OF LIPASE: CPT | Performed by: EMERGENCY MEDICINE

## 2022-07-01 PROCEDURE — 90715 TDAP VACCINE 7 YRS/> IM: CPT | Performed by: EMERGENCY MEDICINE

## 2022-07-01 PROCEDURE — 36415 COLL VENOUS BLD VENIPUNCTURE: CPT | Performed by: EMERGENCY MEDICINE

## 2022-07-01 PROCEDURE — 96361 HYDRATE IV INFUSION ADD-ON: CPT

## 2022-07-01 PROCEDURE — 70486 CT MAXILLOFACIAL W/O DYE: CPT

## 2022-07-01 PROCEDURE — 85025 COMPLETE CBC W/AUTO DIFF WBC: CPT | Performed by: EMERGENCY MEDICINE

## 2022-07-01 PROCEDURE — 90471 IMMUNIZATION ADMIN: CPT

## 2022-07-01 PROCEDURE — 82553 CREATINE MB FRACTION: CPT | Performed by: EMERGENCY MEDICINE

## 2022-07-01 PROCEDURE — 80179 DRUG ASSAY SALICYLATE: CPT | Performed by: EMERGENCY MEDICINE

## 2022-07-01 PROCEDURE — 80143 DRUG ASSAY ACETAMINOPHEN: CPT | Performed by: EMERGENCY MEDICINE

## 2022-07-01 PROCEDURE — 99285 EMERGENCY DEPT VISIT HI MDM: CPT

## 2022-07-01 PROCEDURE — 82550 ASSAY OF CK (CPK): CPT | Performed by: EMERGENCY MEDICINE

## 2022-07-01 RX ADMIN — TETANUS TOXOID, REDUCED DIPHTHERIA TOXOID AND ACELLULAR PERTUSSIS VACCINE, ADSORBED 0.5 ML: 5; 2.5; 8; 8; 2.5 SUSPENSION INTRAMUSCULAR at 22:49

## 2022-07-01 RX ADMIN — SODIUM CHLORIDE 1000 ML: 0.9 INJECTION, SOLUTION INTRAVENOUS at 22:49

## 2022-07-01 NOTE — Clinical Note
Cari Mariangel was seen and treated in our emergency department on 7/1/2022  Diagnosis:     Marco Ureña  may return to work on return date  She may return on this date: 07/05/2022         If you have any questions or concerns, please don't hesitate to call        Angela Robins DO    ______________________________           _______________          _______________  Hospital Representative                              Date                                Time

## 2022-07-02 VITALS
RESPIRATION RATE: 18 BRPM | HEART RATE: 54 BPM | DIASTOLIC BLOOD PRESSURE: 73 MMHG | OXYGEN SATURATION: 98 % | TEMPERATURE: 98.6 F | SYSTOLIC BLOOD PRESSURE: 119 MMHG

## 2022-07-02 PROCEDURE — 96374 THER/PROPH/DIAG INJ IV PUSH: CPT

## 2022-07-02 RX ORDER — KETOROLAC TROMETHAMINE 30 MG/ML
15 INJECTION, SOLUTION INTRAMUSCULAR; INTRAVENOUS ONCE
Status: COMPLETED | OUTPATIENT
Start: 2022-07-02 | End: 2022-07-02

## 2022-07-02 RX ADMIN — KETOROLAC TROMETHAMINE 15 MG: 30 INJECTION, SOLUTION INTRAMUSCULAR at 00:11
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
## 2023-01-16 ENCOUNTER — APPOINTMENT (EMERGENCY)
Dept: RADIOLOGY | Facility: HOSPITAL | Age: 43
End: 2023-01-16

## 2023-01-16 ENCOUNTER — HOSPITAL ENCOUNTER (EMERGENCY)
Facility: HOSPITAL | Age: 43
Discharge: HOME | End: 2023-01-16
Attending: EMERGENCY MEDICINE

## 2023-01-16 VITALS
OXYGEN SATURATION: 99 % | TEMPERATURE: 98.6 F | SYSTOLIC BLOOD PRESSURE: 97 MMHG | HEART RATE: 85 BPM | WEIGHT: 136.24 LBS | BODY MASS INDEX: 25.74 KG/M2 | DIASTOLIC BLOOD PRESSURE: 54 MMHG | RESPIRATION RATE: 16 BRPM

## 2023-01-16 DIAGNOSIS — M54.42 ACUTE LEFT-SIDED LOW BACK PAIN WITH LEFT-SIDED SCIATICA: Primary | ICD-10-CM

## 2023-01-16 LAB
BILIRUB UR QL STRIP: NEGATIVE
CLARITY UR: CLEAR
COLOR UR: ABNORMAL
GLUCOSE UR STRIP-MCNC: NEGATIVE MG/DL
HGB UR QL STRIP.AUTO: NEGATIVE
KETONES UR STRIP-MCNC: ABNORMAL MG/DL
LEUKOCYTE ESTERASE UR QL STRIP: NEGATIVE
NITRITE UR QL STRIP: NEGATIVE
PH UR STRIP.AUTO: 6.5 [PH]
PROT UR STRIP-MCNC: NEGATIVE MG/DL
SP GR UR STRIP.AUTO: 1.02 (ref 1–1.03)
UROBILINOGEN UR QL STRIP.AUTO: 0.2 E.U./DL

## 2023-01-16 RX ORDER — PREDNISONE 20 MG/1
40 TABLET ORAL ONCE
Status: COMPLETED | OUTPATIENT
Start: 2023-01-16 | End: 2023-01-16

## 2023-01-16 RX ORDER — PREDNISONE 20 MG/1
20 TABLET ORAL DAILY
Qty: 15 TABLET | Refills: 0 | Status: SHIPPED | OUTPATIENT
Start: 2023-01-16

## 2023-01-16 RX ORDER — OXYCODONE HYDROCHLORIDE AND ACETAMINOPHEN 5; 325 MG/1; MG/1
1 TABLET ORAL ONCE
Status: COMPLETED | OUTPATIENT
Start: 2023-01-16 | End: 2023-01-16

## 2023-01-16 RX ORDER — HYDROMORPHONE HCL/PF 1 MG/ML
1 SYRINGE (ML) INJECTION ONCE
Status: COMPLETED | OUTPATIENT
Start: 2023-01-16 | End: 2023-01-16

## 2023-01-16 RX ORDER — METHOCARBAMOL 500 MG/1
500 TABLET, FILM COATED ORAL 2 TIMES DAILY
Qty: 20 TABLET | Refills: 0 | Status: SHIPPED | OUTPATIENT
Start: 2023-01-16

## 2023-01-16 RX ADMIN — OXYCODONE HYDROCHLORIDE AND ACETAMINOPHEN 1 TABLET: 5; 325 TABLET ORAL at 23:07

## 2023-01-16 RX ADMIN — PREDNISONE 40 MG: 20 TABLET ORAL at 21:39

## 2023-01-16 RX ADMIN — OXYCODONE HYDROCHLORIDE AND ACETAMINOPHEN 1 TABLET: 5; 325 TABLET ORAL at 22:37

## 2023-01-16 RX ADMIN — HYDROMORPHONE HYDROCHLORIDE 1 MG: 1 INJECTION, SOLUTION INTRAMUSCULAR; INTRAVENOUS; SUBCUTANEOUS at 21:36

## 2023-01-16 NOTE — Clinical Note
Sybil Graf was seen and treated in our emergency department on 1/16/2023  Diagnosis:     Ava eD Jesus  may return to work on return date  She may return on this date: 01/18/2023         If you have any questions or concerns, please don't hesitate to call        Aníbal Hawthorne, DO    ______________________________           _______________          _______________  Hospital Representative                              Date                                Time

## 2023-01-17 NOTE — ED PROVIDER NOTES
History  Chief Complaint   Patient presents with   • Back Pain     C/o Llower back pain for a couple of days  States hx of 2 back surgeries  Pain going down L leg and making leg weak     54-year-old female presents with low back pain for last couple of days  Does have a history of 2 back surgeries in the past where she had a fracture of her lumbar spine which compressed on her nerves  States that she has pain going down the left buttocks from her back no known trauma that she is aware of she tried to lay around yesterday make it feel better did not get better  She thinks that her left leg is slightly weak because of that  Patient is able to flex both legs up from standing position she is able to do a elevation of her foot and toes bilaterally  History provided by:  Patient   used: No        Prior to Admission Medications   Prescriptions Last Dose Informant Patient Reported? Taking?    FLUoxetine (PROzac) 40 MG capsule   Yes No   Sig: Take 80 mg by mouth daily    Patient not taking: Reported on 7/16/2021   LORazepam (ATIVAN) 0 5 mg tablet   Yes No   Sig: Take 1 mg by mouth every 8 (eight) hours as needed for anxiety    Melatonin-Pyridoxine (MELATIN PO)   Yes No   Sig: Take 3 mg by mouth daily at bedtime   OLANZapine (ZyPREXA) 2 5 mg tablet   Yes No   Sig: Take 2 5 mg by mouth daily at bedtime   Patient not taking: Reported on 7/16/2021   acetaminophen (TYLENOL) 325 mg tablet   No No   Sig: Take 3 tablets (975 mg total) by mouth every 8 (eight) hours   Patient not taking: Reported on 7/16/2021   acetaminophen-codeine (TYLENOL #3) 300-30 mg per tablet Not Taking  Yes No   Sig: Take 1 tablet by mouth every 4 (four) hours as needed for moderate pain   Patient not taking: Reported on 1/16/2023   b complex vitamins capsule   Yes No   Sig: Take 1 capsule by mouth daily   buprenorphine (SUBUTEX) 2 mg   Yes No   Sig: Place 8 mg under the tongue 2 (two) times a day   Patient not taking: Reported on 2021   busPIRone (BUSPAR) 15 mg tablet   Yes No   Sig: Take 20 mg by mouth 3 (three) times a day    furosemide (LASIX) 20 mg tablet   Yes No   Sig: Take 20 mg by mouth if needed   gabapentin (NEURONTIN) 800 mg tablet   Yes No   Sig: Take 1,200 mg by mouth 3 (three) times a day    lithium carbonate 150 mg capsule   Yes No   Sig: Take 450 mg by mouth daily at bedtime   Patient not taking: Reported on 2021   meclizine (ANTIVERT) 12 5 MG tablet Not Taking  No No   Sig: Take 1 tablet (12 5 mg total) by mouth every 8 (eight) hours   Patient not taking: Reported on 2023   methocarbamol (ROBAXIN) 750 mg tablet   No No   Sig: Take 1 tablet (750 mg total) by mouth every 6 (six) hours for 25 doses   methylPREDNISolone 4 MG tablet therapy pack Not Taking  No No   Sig: Use as directed on package   Patient not taking: Reported on 2023   nicotine (NICODERM CQ) 21 mg/24 hr TD 24 hr patch   Yes No   Sig: Place 1 patch on the skin every 24 hours   Patient not taking: Reported on 2021   omeprazole (PriLOSEC) 40 MG capsule   Yes No   Sig: Take 40 mg by mouth daily   propranolol (INDERAL) 40 mg tablet Not Taking  Yes No   Sig: Take 40 mg by mouth 2 (two) times a day   Patient not taking: Reported on 2023      Facility-Administered Medications: None       Past Medical History:   Diagnosis Date   • Anemia    • Anxiety    • Chronic left-sided low back pain with left-sided sciatica 2020   • Chronic pain    • Chronic pain syndrome 2020   • Depression    • Diabetes ([facility] [address] )    • Diabetes mellitus ([facility] [address] )     no meds   • Psychiatric disorder    • Vegetarian        Past Surgical History:   Procedure Laterality Date   • ANTERIOR / POSTERIOR COMBINED FUSION THORACIC SPINE     • BACK SURGERY     •  SECTION     • GASTRIC BYPASS     • JORGE-EN-Y PROCEDURE         • SALPINGECTOMY     • TUBAL LIGATION         Family History   Problem Relation Age of Onset   • No Known Problems Mother    • No Known Problems Father    • Kidney disease Maternal Grandfather      I have reviewed and agree with the history as documented  E-Cigarette/Vaping   • E-Cigarette Use Current Every Day User      E-Cigarette/Vaping Substances   • Nicotine No    • THC No    • CBD No    • Flavoring No    • Other No    • Unknown No      Social History     Tobacco Use   • Smoking status: Former     Packs/day: 0 50     Types: Cigarettes   • Smokeless tobacco: Never   Vaping Use   • Vaping Use: Every day   Substance Use Topics   • Alcohol use: Yes     Comment: social   • Drug use: Yes     Types: Marijuana     Comment: ene Aponte  for pain       Review of Systems   Constitutional: Negative for activity change, chills, diaphoresis and fever  HENT: Negative for congestion, ear pain, nosebleeds, sore throat, trouble swallowing and voice change  Eyes: Negative for pain, discharge and redness  Respiratory: Negative for apnea, cough, choking, shortness of breath, wheezing and stridor  Cardiovascular: Negative for chest pain and palpitations  Gastrointestinal: Negative for abdominal distention, abdominal pain, constipation, diarrhea, nausea and vomiting  Endocrine: Negative for polydipsia  Genitourinary: Negative for difficulty urinating, dysuria, flank pain, frequency, hematuria and urgency  Musculoskeletal: Positive for back pain  Negative for gait problem, joint swelling, myalgias, neck pain and neck stiffness  Skin: Negative for pallor and rash  Neurological: Negative for dizziness, tremors, syncope, speech difficulty, weakness, numbness and headaches  Hematological: Negative for adenopathy  Psychiatric/Behavioral: Negative for confusion, hallucinations, self-injury and suicidal ideas  The patient is not nervous/anxious  Physical Exam  Physical Exam  Vitals and nursing note reviewed  Constitutional:       General: She is not in acute distress  Appearance: She is well-developed  She is not diaphoretic     HENT:      Head: Normocephalic and atraumatic  Right Ear: External ear normal       Left Ear: External ear normal       Nose: Nose normal    Eyes:      Conjunctiva/sclera: Conjunctivae normal       Pupils: Pupils are equal, round, and reactive to light  Cardiovascular:      Rate and Rhythm: Normal rate and regular rhythm  Heart sounds: Normal heart sounds  Pulmonary:      Effort: Pulmonary effort is normal       Breath sounds: Normal breath sounds  Abdominal:      General: Bowel sounds are normal       Palpations: Abdomen is soft  Musculoskeletal:         General: Tenderness present  Normal range of motion  Cervical back: Normal range of motion and neck supple  Comments: Back pain lumbar paravertebral region radiating down left buttocks   Skin:     General: Skin is warm and dry  Neurological:      Mental Status: She is alert and oriented to person, place, and time  Deep Tendon Reflexes: Reflexes are normal and symmetric  Psychiatric:         Behavior: Behavior is cooperative           Vital Signs  ED Triage Vitals [01/16/23 2116]   Temperature Pulse Respirations Blood Pressure SpO2   98 6 °F (37 °C) 85 16 97/54 99 %      Temp Source Heart Rate Source Patient Position - Orthostatic VS BP Location FiO2 (%)   Tympanic Monitor Standing Right arm --      Pain Score       10 - Worst Possible Pain           Vitals:    01/16/23 2116   BP: 97/54   Pulse: 85   Patient Position - Orthostatic VS: Standing         Visual Acuity      ED Medications  Medications   HYDROmorphone (DILAUDID) injection 1 mg (1 mg Intramuscular Given 1/16/23 2136)   predniSONE tablet 40 mg (40 mg Oral Given 1/16/23 2139)   oxyCODONE-acetaminophen (PERCOCET) 5-325 mg per tablet 1 tablet (1 tablet Oral Given 1/16/23 2237)   oxyCODONE-acetaminophen (PERCOCET) 5-325 mg per tablet 1 tablet (1 tablet Oral Given 1/16/23 2307)       Diagnostic Studies  Results Reviewed     Procedure Component Value Units Date/Time    UA (URINE) with reflex to Scope [[id number]]  (Abnormal) Collected: 01/16/23 2243    Lab Status: Final result Specimen: Urine, Clean Catch Updated: 01/16/23 2248     Color, UA Light Yellow     Clarity, UA Clear     Specific [place], UA 1 020     pH, UA 6 5     Leukocytes, UA Negative     Nitrite, UA Negative     Protein, UA Negative mg/dl      Glucose, UA Negative mg/dl      Ketones, UA Trace mg/dl      Urobilinogen, UA 0 2 E U /dl      Bilirubin, UA Negative     Occult Blood, UA Negative                 CT lumbar spine without contrast   Final Result by James Guajardo MD (01/16 2229)      Stable postsurgical appearance of the lumbar spine without acute findings, evidence for hardware failure, or significant change from a prior CT lumbar spine dated May 1, 2021  No acute osseous abnormality  If symptoms persist, a nonemergent MRI of the lumbar spine could be performed for further evaluation  Workstation performed: [id number]                    Procedures  Procedures         ED Course                               SBIRT 22yo+    Flowsheet Row Most Recent Value   SBIRT (25 yo +)    In order to provide better care to our patients, we are screening all of our patients for alcohol and drug use  Would it be okay to ask you these screening questions? No Filed at: 01/16/2023 2122                    MDM    Disposition  Final diagnoses:   Acute left-sided low back pain with left-sided sciatica     Time reflects when diagnosis was documented in both MDM as applicable and the Disposition within this note     Time User Action Codes Description Comment    1/16/2023 10:33 PM Martin Holt Add [M54 42] Acute left-sided low back pain with left-sided sciatica       ED Disposition     ED Disposition   Discharge    Condition   Stable    Date/Time   Mon Jan 16, 2023 10:33 PM    [address] discharge to home/self care                 Follow-up Information     Follow up With Specialties Details Why Contact Info Additional [address] [facility] Emergency Department Emergency Medicine  As needed [address] [address]  [address] [address] Emergency Department, Formerly [facility], [address] [place], [place], [address]          Discharge Medication List as of 1/16/2023 10:35 PM      START taking these medications    Details   predniSONE 20 mg tablet Take 1 tablet (20 mg total) by mouth daily, Starting Mon 1/16/2023, Normal         CONTINUE these medications which have CHANGED    Details   methocarbamol (ROBAXIN) 500 mg tablet Take 1 tablet (500 mg total) by mouth 2 (two) times a day, Starting Mon 1/16/2023, Normal         CONTINUE these medications which have NOT CHANGED    Details   acetaminophen (TYLENOL) 325 mg tablet Take 3 tablets (975 mg total) by mouth every 8 (eight) hours, Starting Sat 5/1/2021, Normal      acetaminophen-codeine (TYLENOL #3) 300-30 mg per tablet Take 1 tablet by mouth every 4 (four) hours as needed for moderate pain, Historical Med      b complex vitamins capsule Take 1 capsule by mouth daily, Historical Med      buprenorphine (SUBUTEX) 2 mg Place 8 mg under the tongue 2 (two) times a day, Historical Med      busPIRone (BUSPAR) 15 mg tablet Take 20 mg by mouth 3 (three) times a day , Historical Med      FLUoxetine (PROzac) 40 MG capsule Take 80 mg by mouth daily , Historical Med      furosemide (LASIX) 20 mg tablet Take 20 mg by mouth if needed, Historical Med      gabapentin (NEURONTIN) 800 mg tablet Take 1,200 mg by mouth 3 (three) times a day , Historical Med      lithium carbonate 150 mg capsule Take 450 mg by mouth daily at bedtime, Historical Med      LORazepam (ATIVAN) 0 5 mg tablet Take 1 mg by mouth every 8 (eight) hours as needed for anxiety , Historical Med      meclizine (ANTIVERT) 12 5 MG tablet Take 1 tablet (12 5 mg total) by mouth every 8 (eight) hours, Starting Sat 5/1/2021, Normal      Melatonin-Pyridoxine (MELATIN PO) Take 3 mg by mouth daily at bedtime, Historical Med      methylPREDNISolone 4 MG tablet therapy pack Use as directed on package, Normal      nicotine (NICODERM CQ) 21 mg/24 hr TD 24 hr patch Place 1 patch on the skin every 24 hours, Historical Med      OLANZapine (ZyPREXA) 2 5 mg tablet Take 2 5 mg by mouth daily at bedtime, Historical Med      omeprazole (PriLOSEC) 40 MG capsule Take 40 mg by mouth daily, Historical Med      propranolol (INDERAL) 40 mg tablet Take 40 mg by mouth 2 (two) times a day, Historical Med             No discharge procedures on file      PDMP Review       Value Time User    PDMP Reviewed  Yes 5/1/2021 10:12 AM Meryle Amber, CRNP          ED Provider  Electronically Signed by           Guillermina Mcmillan DO  01/16/23 7986

## 2023-01-19 ENCOUNTER — HOSPITAL ENCOUNTER (EMERGENCY)
Facility: HOSPITAL | Age: 43
Discharge: HOME | End: 2023-01-19
Attending: EMERGENCY MEDICINE

## 2023-01-19 VITALS
DIASTOLIC BLOOD PRESSURE: 84 MMHG | HEIGHT: 61 IN | SYSTOLIC BLOOD PRESSURE: 120 MMHG | HEART RATE: 70 BPM | TEMPERATURE: 98.8 F | RESPIRATION RATE: 18 BRPM | OXYGEN SATURATION: 100 % | WEIGHT: 136 LBS | BODY MASS INDEX: 25.68 KG/M2

## 2023-01-19 DIAGNOSIS — M54.16 LUMBAR RADICULOPATHY: Primary | ICD-10-CM

## 2023-01-19 RX ORDER — GABAPENTIN 300 MG/1
600 CAPSULE ORAL ONCE
Status: COMPLETED | OUTPATIENT
Start: 2023-01-19 | End: 2023-01-19

## 2023-01-19 RX ORDER — LIDOCAINE 50 MG/G
1 PATCH TOPICAL ONCE
Status: DISCONTINUED | OUTPATIENT
Start: 2023-01-19 | End: 2023-01-19 | Stop reason: HOSPADM

## 2023-01-19 RX ORDER — LIDOCAINE 50 MG/G
1 PATCH TOPICAL DAILY
Qty: 30 PATCH | Refills: 0 | Status: SHIPPED | OUTPATIENT
Start: 2023-01-19 | End: 2023-02-18

## 2023-01-19 RX ORDER — OXYCODONE HYDROCHLORIDE AND ACETAMINOPHEN 5; 325 MG/1; MG/1
1 TABLET ORAL ONCE
Status: COMPLETED | OUTPATIENT
Start: 2023-01-19 | End: 2023-01-19

## 2023-01-19 RX ADMIN — LIDOCAINE 1 PATCH: 50 PATCH CUTANEOUS at 14:43

## 2023-01-19 RX ADMIN — GABAPENTIN 600 MG: 300 CAPSULE ORAL at 14:43

## 2023-01-19 RX ADMIN — OXYCODONE HYDROCHLORIDE AND ACETAMINOPHEN 1 TABLET: 5; 325 TABLET ORAL at 14:43

## 2023-01-19 NOTE — DISCHARGE INSTRUCTIONS
You were evaluated for you back pain in the emergency department and determined appropriate for discharge  Please return to the emergency department immediately if you develop any significant worsening of pain, fevers, chills, urinary or bowel incontinence, urinary retention, numbness in the groin area, worsening leg weakness or numbness, or are unable to walk  Continue symptomatic relief at home  Please follow up with your primary care provider in the next 5-7 days for further evaluation  We have provided a referral to the comprehensive spine program   They will call you within the next few days  If you do not hear from them, call the number above to follow-up

## 2023-01-19 NOTE — ED PROVIDER NOTES
History  Chief Complaint   Patient presents with   • Back Pain     Lower back pain that radiates down left leg for few days, here the other day for same thing and referred to pcp who referred her to pain specialist but she doesn't have an appt for another month     HPI  Patient is a 26-year-old female history of chronic left-sided low back pain with sciatica, chronic pain syndrome presenting for evaluation of acute on chronic lower back pain with radiation down the left leg with some associated left leg weakness and numbness  Patient states that about 5 days ago she lost her balance and fell onto her back  Patient states that initially she was pain-free but since that time has been progressive, severe, worse with lying flat and twisting  Patient denies any additional change in activity or trauma to the area  Patient denies urinary or bowel incontinence, urinary retention, saddle anesthesia, fevers, chills  Patient states that she is supposed to follow-up with her pain specialist and spine specialist in about a month but cannot wait for follow-up  Prior to Admission Medications   Prescriptions Last Dose Informant Patient Reported? Taking?    FLUoxetine (PROzac) 40 MG capsule   Yes No   Sig: Take 80 mg by mouth daily    Patient not taking: Reported on 7/16/2021   LORazepam (ATIVAN) 0 5 mg tablet   Yes No   Sig: Take 1 mg by mouth every 8 (eight) hours as needed for anxiety    Melatonin-Pyridoxine (MELATIN PO)   Yes No   Sig: Take 3 mg by mouth daily at bedtime   OLANZapine (ZyPREXA) 2 5 mg tablet   Yes No   Sig: Take 2 5 mg by mouth daily at bedtime   Patient not taking: Reported on 7/16/2021   acetaminophen (TYLENOL) 325 mg tablet   No No   Sig: Take 3 tablets (975 mg total) by mouth every 8 (eight) hours   Patient not taking: Reported on 7/16/2021   acetaminophen-codeine (TYLENOL #3) 300-30 mg per tablet   Yes No   Sig: Take 1 tablet by mouth every 4 (four) hours as needed for moderate pain   Patient not taking: Reported on 1/16/2023   b complex vitamins capsule   Yes No   Sig: Take 1 capsule by mouth daily   buprenorphine (SUBUTEX) 2 mg   Yes No   Sig: Place 8 mg under the tongue 2 (two) times a day   Patient not taking: Reported on 7/16/2021   busPIRone (BUSPAR) 15 mg tablet   Yes No   Sig: Take 20 mg by mouth 3 (three) times a day    furosemide (LASIX) 20 mg tablet   Yes No   Sig: Take 20 mg by mouth if needed   gabapentin (NEURONTIN) 800 mg tablet   Yes No   Sig: Take 1,200 mg by mouth 3 (three) times a day    lithium carbonate 150 mg capsule   Yes No   Sig: Take 450 mg by mouth daily at bedtime   Patient not taking: Reported on 7/16/2021   meclizine (ANTIVERT) 12 5 MG tablet   No No   Sig: Take 1 tablet (12 5 mg total) by mouth every 8 (eight) hours   Patient not taking: Reported on 1/16/2023   methocarbamol (ROBAXIN) 500 mg tablet   No No   Sig: Take 1 tablet (500 mg total) by mouth 2 (two) times a day   methylPREDNISolone 4 MG tablet therapy pack   No No   Sig: Use as directed on package   Patient not taking: Reported on 1/16/2023   nicotine (NICODERM CQ) 21 mg/24 hr TD 24 hr patch   Yes No   Sig: Place 1 patch on the skin every 24 hours   Patient not taking: Reported on 7/16/2021   omeprazole (PriLOSEC) 40 MG capsule   Yes No   Sig: Take 40 mg by mouth daily   predniSONE 20 mg tablet   No No   Sig: Take 1 tablet (20 mg total) by mouth daily   propranolol (INDERAL) 40 mg tablet   Yes No   Sig: Take 40 mg by mouth 2 (two) times a day   Patient not taking: Reported on 1/16/2023      Facility-Administered Medications: None       Past Medical History:   Diagnosis Date   • Anemia    • Anxiety    • Chronic left-sided low back pain with left-sided sciatica 2/12/2020   • Chronic pain    • Chronic pain syndrome 2/12/2020   • Depression    • Diabetes ([facility] [address] )    • Diabetes mellitus ([facility] [address] )     no meds   • Psychiatric disorder    • Vegetarian        Past Surgical History:   Procedure Laterality Date   • ANTERIOR / POSTERIOR COMBINED FUSION THORACIC SPINE     • BACK SURGERY     •  SECTION     • GASTRIC BYPASS     • JORGE-EN-Y PROCEDURE         • SALPINGECTOMY     • TUBAL LIGATION         Family History   Problem Relation Age of Onset   • No Known Problems Mother    • No Known Problems Father    • Kidney disease Maternal Grandfather      I have reviewed and agree with the history as documented  E-Cigarette/Vaping   • E-Cigarette Use Current Every Day User      E-Cigarette/Vaping Substances   • Nicotine No    • THC No    • CBD No    • Flavoring No    • Other No    • Unknown No      Social History     Tobacco Use   • Smoking status: Former     Packs/day: 0 50     Types: Cigarettes   • Smokeless tobacco: Never   Vaping Use   • Vaping Use: Every day   Substance Use Topics   • Alcohol use: Yes     Comment: social   • Drug use: Yes     Types: Marijuana     Comment: med  Cm Garcia  for pain       Review of Systems   Constitutional: Negative for chills and fever  Musculoskeletal: Positive for back pain  Negative for arthralgias and neck pain  Neurological: Positive for weakness (Left lower extremity) and numbness (Left lower extremity)  All other systems reviewed and are negative  Physical Exam  Physical Exam  Vitals and nursing note reviewed  Constitutional:       General: She is not in acute distress  Appearance: Normal appearance  She is not ill-appearing, toxic-appearing or diaphoretic  HENT:      Head: Normocephalic and atraumatic  Right Ear: External ear normal       Left Ear: External ear normal    Eyes:      General:         Right eye: No discharge  Left eye: No discharge  Pulmonary:      Effort: No respiratory distress  Abdominal:      General: There is no distension  Musculoskeletal:         General: No deformity  Cervical back: Normal range of motion  Comments: Midline lumbar tenderness without overlying skin changes, step-off or deformity    4-5 strength left lower extremity compared to the right lower extremity, stating somewhat diminished sensation to light touch throughout the left lower extremity  Patient ambulatory with pain  Skin:     Findings: No lesion or rash  Neurological:      Mental Status: She is alert and oriented to person, place, and time  Mental status is at baseline  Psychiatric:         Mood and Affect: Mood and affect normal          Vital Signs  ED Triage Vitals [01/19/23 1344]   Temperature Pulse Respirations Blood Pressure SpO2   98 8 °F (37 1 °C) 70 18 120/84 100 %      Temp src Heart Rate Source Patient Position - Orthostatic VS BP Location FiO2 (%)   -- -- -- -- --      Pain Score       10 - Worst Possible Pain           Vitals:    01/19/23 1344   BP: 120/84   Pulse: 70         Visual Acuity      ED Medications  Medications   oxyCODONE-acetaminophen (PERCOCET) 5-325 mg per tablet 1 tablet (has no administration in time range)   lidocaine (LIDODERM) 5 % patch 1 patch (has no administration in time range)   gabapentin (NEURONTIN) capsule 600 mg (has no administration in time range)       Diagnostic Studies  Results Reviewed     None                 No orders to display              Procedures  Procedures         ED Course                                             Medical Decision Making  Patient presenting once again for evaluation of apparent lumbar radiculopathy  Patient without symptoms of cauda equina syndrome  Ambulatory with pain  Patient previously prescribed regiment for symptomatic pain management  Lidoderm added to pain regiment, provided with an ambulatory referral to comprehensive spine, discharged with return precautions  Lumbar radiculopathy: self-limited or minor problem  Risk  Prescription drug management            Disposition  Final diagnoses:   Lumbar radiculopathy     Time reflects when diagnosis was documented in both MDM as applicable and the Disposition within this note     Time User Action Codes Description Comment 1/19/2023  2:33 PM Barrie Kj Add [M54 16] Lumbar radiculopathy       ED Disposition     ED Disposition   Discharge    Condition   Stable    Date/Time   Thu Jan 19, 2023  2:33 PM    [address] discharge to home/self care  Follow-up Information    None         Patient's Medications   Discharge Prescriptions    No medications on file       No discharge procedures on file      PDMP Review       Value Time User    PDMP Reviewed  Yes 5/1/2021 10:12 AM TRESA Serrano          ED Provider  Electronically Signed by           Jody Salazar MD  01/19/23 7771

## 2023-01-23 ENCOUNTER — NURSE TRIAGE (OUTPATIENT)
Dept: PHYSICAL THERAPY | Facility: OTHER | Age: 43
End: 2023-01-23

## 2023-01-23 DIAGNOSIS — M54.50 ACUTE EXACERBATION OF CHRONIC LOW BACK PAIN: Primary | ICD-10-CM

## 2023-01-23 DIAGNOSIS — G89.29 ACUTE EXACERBATION OF CHRONIC LOW BACK PAIN: Primary | ICD-10-CM

## 2023-01-23 NOTE — TELEPHONE ENCOUNTER
Additional Information  • Negative: Is this related to an MVA? • Negative: Are you currently recieving homecare services? • Negative: Is this related to a work injury? • Negative: Has the patient had unexplained weight loss? • Negative: Does the patient have a fever? • Negative: Is the patient experiencing blood in sputum? • Negative: Is the patient experiencing urine retention? • Negative: Is the patient experiencing acute drop foot or paralysis? • Negative: Has the patient experienced major trauma? (fall from height, high speed collision, direct blow to spine) and is also experiencing nausea, light-headedness, or loss of consciousness? Background - Initial Assessment  Clinical complaint: left lower back pain which radiates to the left leg  States she has numbness and tingling of the left leg  States she has a history of back surgery x 2  States this pain started 1 week ago with NKI  Date of onset: 1 week  Frequency of pain: intermittent  Quality of pain: sharp, shooting and stabbing    Protocols used: [facility] COMPREHENSIVE SPINE PROGRAM PROTOCOL    This RN did review in detail the Comprehensive Spine Program and what we can provide for their back pain  Patient is agreeable to being triaged by this RN and would like to proceed with Physical Therapy  Referral was placed for Physical Therapy at the [place] site  Patients information was sent to the  to make evaluation appointment  Patient made aware that the PT office  will be calling to schedule the appointment  Patient was provided with the phone number to the PT office  No further questions and/or concerns were voiced by the patient at this time  Patient states understanding of the referral that was placed  Referral Closed

## 2023-02-22 ENCOUNTER — TELEPHONE (OUTPATIENT)
Dept: PHYSICAL THERAPY | Facility: OTHER | Age: 43
End: 2023-02-22

## 2023-02-22 NOTE — TELEPHONE ENCOUNTER
Courtesy phone call to the patient on 02/22/23 to follow up after being triaged by [facility]      Mailbox is full, unable to leave a v/m  @3:06pm

## 2023-04-26 ENCOUNTER — HOSPITAL ENCOUNTER (EMERGENCY)
Facility: HOSPITAL | Age: 43
Discharge: HOME | End: 2023-04-27
Attending: EMERGENCY MEDICINE

## 2023-04-26 ENCOUNTER — APPOINTMENT (EMERGENCY)
Dept: RADIOLOGY | Facility: HOSPITAL | Age: 43
End: 2023-04-26

## 2023-04-26 VITALS
HEART RATE: 76 BPM | TEMPERATURE: 97.9 F | SYSTOLIC BLOOD PRESSURE: 113 MMHG | OXYGEN SATURATION: 99 % | DIASTOLIC BLOOD PRESSURE: 66 MMHG | RESPIRATION RATE: 18 BRPM

## 2023-04-26 DIAGNOSIS — R10.9 FLANK PAIN: Primary | ICD-10-CM

## 2023-04-26 LAB
ALBUMIN SERPL BCP-MCNC: 4.6 G/DL (ref 3.5–5)
ALP SERPL-CCNC: 56 U/L (ref 34–104)
ALT SERPL W P-5'-P-CCNC: 21 U/L (ref 7–52)
ANION GAP SERPL CALCULATED.3IONS-SCNC: 11 MMOL/L (ref 4–13)
AST SERPL W P-5'-P-CCNC: 21 U/L (ref 13–39)
BACTERIA UR QL AUTO: ABNORMAL /HPF
BASOPHILS # BLD AUTO: 0.02 THOUSANDS/ΜL (ref 0–0.1)
BASOPHILS NFR BLD AUTO: 0 % (ref 0–1)
BILIRUB SERPL-MCNC: 0.34 MG/DL (ref 0.2–1)
BILIRUB UR QL STRIP: ABNORMAL
BUN SERPL-MCNC: 21 MG/DL (ref 5–25)
CALCIUM SERPL-MCNC: 9.3 MG/DL (ref 8.4–10.2)
CAOX CRY URNS QL MICRO: ABNORMAL /HPF
CHLORIDE SERPL-SCNC: 103 MMOL/L (ref 96–108)
CLARITY UR: ABNORMAL
CO2 SERPL-SCNC: 21 MMOL/L (ref 21–32)
COLOR UR: ABNORMAL
CREAT SERPL-MCNC: 0.92 MG/DL (ref 0.6–1.3)
EOSINOPHIL # BLD AUTO: 0.08 THOUSAND/ΜL (ref 0–0.61)
EOSINOPHIL NFR BLD AUTO: 1 % (ref 0–6)
ERYTHROCYTE [DISTWIDTH] IN BLOOD BY AUTOMATED COUNT: 15.7 % (ref 11.6–15.1)
EXT PREGNANCY TEST URINE: NEGATIVE
EXT. CONTROL: NORMAL
GFR SERPL CREATININE-BSD FRML MDRD: 75 ML/MIN/1.73SQ M
GLUCOSE SERPL-MCNC: 151 MG/DL (ref 65–140)
GLUCOSE UR STRIP-MCNC: NEGATIVE MG/DL
HCT VFR BLD AUTO: 37.5 % (ref 34.8–46.1)
HGB BLD-MCNC: 12.5 G/DL (ref 11.5–15.4)
HGB UR QL STRIP.AUTO: NEGATIVE
HYALINE CASTS #/AREA URNS LPF: ABNORMAL /LPF
IMM GRANULOCYTES # BLD AUTO: 0.02 THOUSAND/UL (ref 0–0.2)
IMM GRANULOCYTES NFR BLD AUTO: 0 % (ref 0–2)
KETONES UR STRIP-MCNC: ABNORMAL MG/DL
LEUKOCYTE ESTERASE UR QL STRIP: NEGATIVE
LIPASE SERPL-CCNC: 14 U/L (ref 11–82)
LYMPHOCYTES # BLD AUTO: 2.15 THOUSANDS/ΜL (ref 0.6–4.47)
LYMPHOCYTES NFR BLD AUTO: 27 % (ref 14–44)
MCH RBC QN AUTO: 29.1 PG (ref 26.8–34.3)
MCHC RBC AUTO-ENTMCNC: 33.3 G/DL (ref 31.4–37.4)
MCV RBC AUTO: 87 FL (ref 82–98)
MONOCYTES # BLD AUTO: 0.46 THOUSAND/ΜL (ref 0.17–1.22)
MONOCYTES NFR BLD AUTO: 6 % (ref 4–12)
MUCOUS THREADS UR QL AUTO: ABNORMAL
NEUTROPHILS # BLD AUTO: 5.12 THOUSANDS/ΜL (ref 1.85–7.62)
NEUTS SEG NFR BLD AUTO: 66 % (ref 43–75)
NITRITE UR QL STRIP: NEGATIVE
NON-SQ EPI CELLS URNS QL MICRO: ABNORMAL /HPF
NRBC BLD AUTO-RTO: 0 /100 WBCS
PH UR STRIP.AUTO: 6 [PH]
PLATELET # BLD AUTO: 415 THOUSANDS/UL (ref 149–390)
PMV BLD AUTO: 8.6 FL (ref 8.9–12.7)
POTASSIUM SERPL-SCNC: 3.6 MMOL/L (ref 3.5–5.3)
PROT SERPL-MCNC: 7.5 G/DL (ref 6.4–8.4)
PROT UR STRIP-MCNC: ABNORMAL MG/DL
RBC # BLD AUTO: 4.29 MILLION/UL (ref 3.81–5.12)
RBC #/AREA URNS AUTO: ABNORMAL /HPF
SODIUM SERPL-SCNC: 135 MMOL/L (ref 135–147)
SP GR UR STRIP.AUTO: >=1.03 (ref 1–1.03)
UROBILINOGEN UR QL STRIP.AUTO: 0.2 E.U./DL
WBC # BLD AUTO: 7.85 THOUSAND/UL (ref 4.31–10.16)
WBC #/AREA URNS AUTO: ABNORMAL /HPF

## 2023-04-26 RX ORDER — CEPHALEXIN 500 MG/1
500 CAPSULE ORAL ONCE
Status: COMPLETED | OUTPATIENT
Start: 2023-04-26 | End: 2023-04-26

## 2023-04-26 RX ORDER — OXYCODONE HYDROCHLORIDE AND ACETAMINOPHEN 5; 325 MG/1; MG/1
1 TABLET ORAL ONCE
Status: COMPLETED | OUTPATIENT
Start: 2023-04-26 | End: 2023-04-26

## 2023-04-26 RX ORDER — CEPHALEXIN 500 MG/1
500 CAPSULE ORAL EVERY 6 HOURS SCHEDULED
Qty: 28 CAPSULE | Refills: 0 | Status: SHIPPED | OUTPATIENT
Start: 2023-04-26 | End: 2023-05-03

## 2023-04-26 RX ADMIN — CEPHALEXIN 500 MG: 500 CAPSULE ORAL at 23:37

## 2023-04-26 RX ADMIN — SODIUM CHLORIDE 1000 ML: 0.9 INJECTION, SOLUTION INTRAVENOUS at 22:19

## 2023-04-26 RX ADMIN — OXYCODONE HYDROCHLORIDE AND ACETAMINOPHEN 1 TABLET: 5; 325 TABLET ORAL at 23:07

## 2023-04-26 NOTE — Clinical Note
Mainorgricelda Jay was seen and treated in our emergency department on 4/26/2023  Diagnosis:     Vincent Barr  may return to work on return date  She may return on this date: 04/29/2023         If you have any questions or concerns, please don't hesitate to call        Jordan Wu, DO    ______________________________           _______________          _______________  Hospital Representative                              Date                                Time

## 2023-04-27 NOTE — ED PROVIDER NOTES
History  Chief Complaint   Patient presents with   • Flank Pain     Bilat flank pain for almost 2 days  Denies any urinary issue other than she is not going very much     50-year-old female complaining of bilateral flank pain for most 2 days states is much more severe on the right  States she was borderline diabetic and has had history of pyelonephritis in the past denies any other urinary issues states she has not been drinking much water she supposed to  Patient also had back surgery in the past and says her back sometimes irritates her due to lifting  History provided by:  Patient   used: No        Prior to Admission Medications   Prescriptions Last Dose Informant Patient Reported? Taking?    FLUoxetine (PROzac) 40 MG capsule   Yes No   Sig: Take 80 mg by mouth daily    Patient not taking: Reported on 7/16/2021   LORazepam (ATIVAN) 0 5 mg tablet   Yes No   Sig: Take 1 mg by mouth every 8 (eight) hours as needed for anxiety    Melatonin-Pyridoxine (MELATIN PO)   Yes No   Sig: Take 3 mg by mouth daily at bedtime   OLANZapine (ZyPREXA) 2 5 mg tablet   Yes No   Sig: Take 2 5 mg by mouth daily at bedtime   Patient not taking: Reported on 7/16/2021   acetaminophen (TYLENOL) 325 mg tablet   No No   Sig: Take 3 tablets (975 mg total) by mouth every 8 (eight) hours   Patient not taking: Reported on 7/16/2021   acetaminophen-codeine (TYLENOL #3) 300-30 mg per tablet   Yes No   Sig: Take 1 tablet by mouth every 4 (four) hours as needed for moderate pain   Patient not taking: Reported on 1/16/2023   b complex vitamins capsule   Yes No   Sig: Take 1 capsule by mouth daily   buprenorphine (SUBUTEX) 2 mg   Yes No   Sig: Place 8 mg under the tongue 2 (two) times a day   Patient not taking: Reported on 7/16/2021   busPIRone (BUSPAR) 15 mg tablet   Yes No   Sig: Take 20 mg by mouth 3 (three) times a day    furosemide (LASIX) 20 mg tablet   Yes No   Sig: Take 20 mg by mouth if needed   gabapentin (NEURONTIN) 800 mg tablet   Yes No   Sig: Take 1,200 mg by mouth 3 (three) times a day    lidocaine (Lidoderm) 5 %   No No   Sig: Apply 1 patch topically over 12 hours daily Remove & Discard patch within 12 hours or as directed by MD   lithium carbonate 150 mg capsule   Yes No   Sig: Take 450 mg by mouth daily at bedtime   Patient not taking: Reported on 2021   meclizine (ANTIVERT) 12 5 MG tablet   No No   Sig: Take 1 tablet (12 5 mg total) by mouth every 8 (eight) hours   Patient not taking: Reported on 2023   methocarbamol (ROBAXIN) 500 mg tablet   No No   Sig: Take 1 tablet (500 mg total) by mouth 2 (two) times a day   methylPREDNISolone 4 MG tablet therapy pack   No No   Sig: Use as directed on package   Patient not taking: Reported on 2023   nicotine (NICODERM CQ) 21 mg/24 hr TD 24 hr patch   Yes No   Sig: Place 1 patch on the skin every 24 hours   Patient not taking: Reported on 2021   omeprazole (PriLOSEC) 40 MG capsule   Yes No   Sig: Take 40 mg by mouth daily   predniSONE 20 mg tablet Not Taking  No No   Sig: Take 1 tablet (20 mg total) by mouth daily   Patient not taking: Reported on 2023   propranolol (INDERAL) 40 mg tablet   Yes No   Sig: Take 40 mg by mouth 2 (two) times a day   Patient not taking: Reported on 2023      Facility-Administered Medications: None       Past Medical History:   Diagnosis Date   • Anemia    • Anxiety    • Chronic left-sided low back pain with left-sided sciatica 2020   • Chronic pain    • Chronic pain syndrome 2020   • Depression    • Diabetes ([facility] [address] )    • Diabetes mellitus ([facility] [address] )     no meds   • Psychiatric disorder    • Vegetarian        Past Surgical History:   Procedure Laterality Date   • ANTERIOR / POSTERIOR COMBINED FUSION THORACIC SPINE     • BACK SURGERY     •  SECTION     • GASTRIC BYPASS     • JORGE-EN-Y PROCEDURE         • SALPINGECTOMY     • TUBAL LIGATION         Family History   Problem Relation Age of Onset   • No Known Problems Mother    • No Known Problems Father    • Kidney disease Maternal Grandfather      I have reviewed and agree with the history as documented  E-Cigarette/Vaping   • E-Cigarette Use Current Every Day User      E-Cigarette/Vaping Substances   • Nicotine No    • THC No    • CBD No    • Flavoring No    • Other No    • Unknown No      Social History     Tobacco Use   • Smoking status: Former     Packs/day: 0 50     Types: Cigarettes   • Smokeless tobacco: Never   Vaping Use   • Vaping Use: Every day   Substance Use Topics   • Alcohol use: Yes     Comment: social   • Drug use: Yes     Types: Marijuana     Comment: med  Gilda Slice  for pain       Review of Systems   Constitutional: Negative for activity change, chills, diaphoresis and fever  HENT: Negative for congestion, ear pain, nosebleeds, sore throat, trouble swallowing and voice change  Eyes: Negative for pain, discharge and redness  Respiratory: Negative for apnea, cough, choking, shortness of breath, wheezing and stridor  Cardiovascular: Negative for chest pain and palpitations  Gastrointestinal: Negative for abdominal distention, abdominal pain, constipation, diarrhea, nausea and vomiting  Endocrine: Negative for polydipsia  Genitourinary: Positive for flank pain  Negative for difficulty urinating, dysuria, frequency, hematuria and urgency  Musculoskeletal: Positive for back pain  Negative for gait problem, joint swelling, myalgias, neck pain and neck stiffness  Skin: Negative for pallor and rash  Neurological: Negative for dizziness, tremors, syncope, speech difficulty, weakness, numbness and headaches  Hematological: Negative for adenopathy  Psychiatric/Behavioral: Negative for confusion, hallucinations, self-injury and suicidal ideas  The patient is not nervous/anxious  Physical Exam  Physical Exam  Vitals and nursing note reviewed  Constitutional:       General: She is not in acute distress       Appearance: She is well-developed  She is not diaphoretic  HENT:      Head: Normocephalic and atraumatic  Right Ear: External ear normal       Left Ear: External ear normal       Nose: Nose normal    Eyes:      Conjunctiva/sclera: Conjunctivae normal       Pupils: Pupils are equal, round, and reactive to light  Cardiovascular:      Rate and Rhythm: Normal rate and regular rhythm  Heart sounds: Normal heart sounds  Pulmonary:      Effort: Pulmonary effort is normal       Breath sounds: Normal breath sounds  Abdominal:      General: Bowel sounds are normal       Palpations: Abdomen is soft  Musculoskeletal:         General: Tenderness present  Normal range of motion  Cervical back: Normal range of motion and neck supple  Comments: Patient is tender in the right flank region  Skin:     General: Skin is warm and dry  Neurological:      Mental Status: She is alert and oriented to person, place, and time  Deep Tendon Reflexes: Reflexes are normal and symmetric           Vital Signs  ED Triage Vitals [04/26/23 2037]   Temperature Pulse Respirations Blood Pressure SpO2   98 °F (36 7 °C) (!) 122 20 109/65 99 %      Temp Source Heart Rate Source Patient Position - Orthostatic VS BP Location FiO2 (%)   Tympanic Monitor Standing Right arm --      Pain Score       10 - Worst Possible Pain           Vitals:    04/26/23 2037 04/26/23 2237   BP: 109/65 117/74   Pulse: (!) 122 82   Patient Position - Orthostatic VS: Standing          Visual Acuity      ED Medications  Medications   cephalexin (KEFLEX) capsule 500 mg (has no administration in time range)   sodium chloride 0 9 % bolus 1,000 mL (1,000 mL Intravenous New Bag 4/26/23 2219)   oxyCODONE-acetaminophen (PERCOCET) 5-325 mg per tablet 1 tablet (1 tablet Oral Given 4/26/23 2307)       Diagnostic Studies  Results Reviewed     Procedure Component Value Units Date/Time    Comprehensive metabolic panel [[id number]]  (Abnormal) Collected: 04/26/23 2157    Lab Status: Final result Specimen: Blood from Arm, Left Updated: 04/26/23 2220     Sodium 135 mmol/L      Potassium 3 6 mmol/L      Chloride 103 mmol/L      CO2 21 mmol/L      ANION GAP 11 mmol/L      BUN 21 mg/dL      Creatinine 0 92 mg/dL      Glucose 151 mg/dL      Calcium 9 3 mg/dL      AST 21 U/L      ALT 21 U/L      Alkaline Phosphatase 56 U/L      Total Protein 7 5 g/dL      Albumin 4 6 g/dL      Total Bilirubin 0 34 mg/dL      eGFR 75 ml/min/1 73sq m     Narrative:      National Kidney Disease Foundation guidelines for Chronic Kidney Disease (CKD):   •  Stage 1 with normal or high GFR (GFR > 90 mL/min/1 73 square meters)  •  Stage 2 Mild CKD (GFR = 60-89 mL/min/1 73 square meters)  •  Stage 3A Moderate CKD (GFR = 45-59 mL/min/1 73 square meters)  •  Stage 3B Moderate CKD (GFR = 30-44 mL/min/1 73 square meters)  •  Stage 4 Severe CKD (GFR = 15-29 mL/min/1 73 square meters)  •  Stage 5 End Stage CKD (GFR <15 mL/min/1 73 square meters)  Note: GFR calculation is accurate only with a steady state creatinine    Lipase [[id number]]  (Normal) Collected: 04/26/23 2157    Lab Status: Final result Specimen: Blood from Arm, Left Updated: 04/26/23 2220     Lipase 14 u/L     Urine Microscopic [[id number]]  (Abnormal) Collected: 04/26/23 2200    Lab Status: Final result Specimen: Urine, Clean Catch Updated: 04/26/23 2219     RBC, UA None Seen /hpf      WBC, UA 2-4 /hpf      Epithelial Cells Moderate /hpf      Bacteria, UA Occasional /hpf      Hyaline Casts, UA 0-1 /lpf      Ca Oxalate Clarice, UA Innumerable /hpf      MUCUS THREADS Moderate    UA (URINE) with reflex to Scope [[id number]]  (Abnormal) Collected: 04/26/23 2200    Lab Status: Final result Specimen: Urine, Clean Catch Updated: 04/26/23 2207     Color, UA Sirena     Clarity, UA Cloudy     Specific Gravity, UA >=1 030     pH, UA 6 0     Leukocytes, UA Negative     Nitrite, UA Negative     Protein, UA Trace mg/dl      Glucose, UA Negative mg/dl      Ketones, UA Trace mg/dl Urobilinogen, UA 0 2 E U /dl      Bilirubin, UA Small     Occult Blood, UA Negative    Urine culture [[id number]] Collected: 04/26/23 2200    Lab Status: In process Specimen: Urine, Clean Catch Updated: 04/26/23 2204    POCT pregnancy, urine [[id number]]  (Normal) Resulted: 04/26/23 2203    Lab Status: Final result Updated: 04/26/23 2203     EXT Preg Test, Ur Negative     Control Valid    CBC and differential [[id number]]  (Abnormal) Collected: 04/26/23 2157    Lab Status: Final result Specimen: Blood from Arm, Left Updated: 04/26/23 2202     WBC 7 85 Thousand/uL      RBC 4 29 Million/uL      Hemoglobin 12 5 g/dL      Hematocrit 37 5 %      MCV 87 fL      MCH 29 1 pg      MCHC 33 3 g/dL      RDW 15 7 %      MPV 8 6 fL      Platelets 402 Thousands/uL      nRBC 0 /100 WBCs      Neutrophils Relative 66 %      Immat GRANS % 0 %      Lymphocytes Relative 27 %      Monocytes Relative 6 %      Eosinophils Relative 1 %      Basophils Relative 0 %      Neutrophils Absolute 5 12 Thousands/µL      Immature Grans Absolute 0 02 Thousand/uL      Lymphocytes Absolute 2 15 Thousands/µL      Monocytes Absolute 0 46 Thousand/µL      Eosinophils Absolute 0 08 Thousand/µL      Basophils Absolute 0 02 Thousands/µL                  CT renal stone study abdomen pelvis without contrast   Final Result by Celeste Hernandez MD (04/26 2320)         No renal stones  No small bowel obstruction  Normal appendix  Consider contrast exam in the appropriate clinical setting  Workstation performed: [id number]                    Procedures  Procedures         ED Course                               SBIRT 20yo+    Flowsheet Row Most Recent Value   Initial Alcohol Screen: US AUDIT-C     1  How often do you have a drink containing alcohol? 1 Filed at: 04/26/2023 2040   2  How many drinks containing alcohol do you have on a typical day you are drinking? 1 Filed at: 04/26/2023 2040   3b  FEMALE Any Age, or MALE 65+:  How often do you have 4 or more drinks on one occassion? 0 Filed at: 04/26/2023 2040   Audit-C Score 2 Filed at: 04/26/2023 2040   PILAR: How many times in the past year have you    Used an illegal drug or used a prescription medication for non-medical reasons? Never Filed at: 04/26/2023 2040                    Medical Decision Making  58-year-old with right flank pain concerning for pyelonephritis versus kidney stone versus UTI versus exacerbation of her prior back pains from her back surgery    Amount and/or Complexity of Data Reviewed  Labs: ordered  Radiology: ordered  Disposition  Final diagnoses:   Flank pain     Time reflects when diagnosis was documented in both MDM as applicable and the Disposition within this note     Time User Action Codes Description Comment    4/26/2023 11:28 PM Altagracia Frausto Add [R10 9] Flank pain       ED Disposition     ED Disposition   Discharge    Condition   Stable    Date/Time   Wed Apr 26, 2023 11:28 PM    [address] discharge to home/self care  Follow-up Information     Follow up With Specialties Details Why Contact Info    Judah Centeno MD  Schedule an appointment as soon as possible for a visit  As needed [address] [place] [address]  [phone]            Patient's Medications   Discharge Prescriptions    CEPHALEXIN (KEFLEX) 500 MG CAPSULE    Take 1 capsule (500 mg total) by mouth every 6 (six) hours for 7 days       Start Date: 4/26/2023 End Date: 5/3/2023       Order Dose: 500 mg       Quantity: 28 capsule    Refills: 0       No discharge procedures on file      PDMP Review       Value Time User    PDMP Reviewed  Yes 5/1/2021 10:12 AM TRESA Perales          ED Provider  Electronically Signed by           Graham Prabhakar DO  04/26/23 1654

## 2023-04-28 LAB — BACTERIA UR CULT: ABNORMAL

## 2023-09-22 ENCOUNTER — HOSPITAL ENCOUNTER (EMERGENCY)
Facility: HOSPITAL | Age: 43
End: 2023-09-25
Attending: EMERGENCY MEDICINE
Payer: COMMERCIAL

## 2023-09-22 DIAGNOSIS — F32.9 MAJOR DEPRESSION: ICD-10-CM

## 2023-09-22 DIAGNOSIS — Z91.89 POTENTIAL FOR INTENTIONAL SELF-HARM: ICD-10-CM

## 2023-09-22 DIAGNOSIS — R45.851 SUICIDAL IDEATIONS: Primary | ICD-10-CM

## 2023-09-22 LAB
ALBUMIN SERPL BCP-MCNC: 3.9 G/DL (ref 3.5–5)
ALP SERPL-CCNC: 60 U/L (ref 34–104)
ALT SERPL W P-5'-P-CCNC: 14 U/L (ref 7–52)
ANION GAP SERPL CALCULATED.3IONS-SCNC: 7 MMOL/L
AST SERPL W P-5'-P-CCNC: 20 U/L (ref 13–39)
BASOPHILS # BLD AUTO: 0.03 THOUSANDS/ÂΜL (ref 0–0.1)
BASOPHILS NFR BLD AUTO: 1 % (ref 0–1)
BILIRUB SERPL-MCNC: 0.34 MG/DL (ref 0.2–1)
BUN SERPL-MCNC: 9 MG/DL (ref 5–25)
CALCIUM SERPL-MCNC: 8.9 MG/DL (ref 8.4–10.2)
CHLORIDE SERPL-SCNC: 107 MMOL/L (ref 96–108)
CO2 SERPL-SCNC: 28 MMOL/L (ref 21–32)
CREAT SERPL-MCNC: 0.7 MG/DL (ref 0.6–1.3)
EOSINOPHIL # BLD AUTO: 0.19 THOUSAND/ÂΜL (ref 0–0.61)
EOSINOPHIL NFR BLD AUTO: 5 % (ref 0–6)
ERYTHROCYTE [DISTWIDTH] IN BLOOD BY AUTOMATED COUNT: 19.2 % (ref 11.6–15.1)
ETHANOL SERPL-MCNC: <10 MG/DL
GFR SERPL CREATININE-BSD FRML MDRD: 105 ML/MIN/1.73SQ M
GLUCOSE SERPL-MCNC: 82 MG/DL (ref 65–140)
HCG SERPL QL: NEGATIVE
HCT VFR BLD AUTO: 31.3 % (ref 34.8–46.1)
HGB BLD-MCNC: 10 G/DL (ref 11.5–15.4)
IMM GRANULOCYTES # BLD AUTO: 0.01 THOUSAND/UL (ref 0–0.2)
IMM GRANULOCYTES NFR BLD AUTO: 0 % (ref 0–2)
LYMPHOCYTES # BLD AUTO: 1.73 THOUSANDS/ÂΜL (ref 0.6–4.47)
LYMPHOCYTES NFR BLD AUTO: 41 % (ref 14–44)
MCH RBC QN AUTO: 25.9 PG (ref 26.8–34.3)
MCHC RBC AUTO-ENTMCNC: 31.9 G/DL (ref 31.4–37.4)
MCV RBC AUTO: 81 FL (ref 82–98)
MONOCYTES # BLD AUTO: 0.36 THOUSAND/ÂΜL (ref 0.17–1.22)
MONOCYTES NFR BLD AUTO: 9 % (ref 4–12)
NEUTROPHILS # BLD AUTO: 1.88 THOUSANDS/ÂΜL (ref 1.85–7.62)
NEUTS SEG NFR BLD AUTO: 44 % (ref 43–75)
NRBC BLD AUTO-RTO: 0 /100 WBCS
PLATELET # BLD AUTO: 421 THOUSANDS/UL (ref 149–390)
PMV BLD AUTO: 8.4 FL (ref 8.9–12.7)
POTASSIUM SERPL-SCNC: 3.6 MMOL/L (ref 3.5–5.3)
PROT SERPL-MCNC: 6.6 G/DL (ref 6.4–8.4)
RBC # BLD AUTO: 3.86 MILLION/UL (ref 3.81–5.12)
SODIUM SERPL-SCNC: 142 MMOL/L (ref 135–147)
WBC # BLD AUTO: 4.2 THOUSAND/UL (ref 4.31–10.16)

## 2023-09-22 PROCEDURE — 99285 EMERGENCY DEPT VISIT HI MDM: CPT | Performed by: EMERGENCY MEDICINE

## 2023-09-22 PROCEDURE — 85025 COMPLETE CBC W/AUTO DIFF WBC: CPT | Performed by: EMERGENCY MEDICINE

## 2023-09-22 PROCEDURE — 80053 COMPREHEN METABOLIC PANEL: CPT | Performed by: EMERGENCY MEDICINE

## 2023-09-22 PROCEDURE — 84703 CHORIONIC GONADOTROPIN ASSAY: CPT | Performed by: EMERGENCY MEDICINE

## 2023-09-22 PROCEDURE — 36415 COLL VENOUS BLD VENIPUNCTURE: CPT | Performed by: EMERGENCY MEDICINE

## 2023-09-22 PROCEDURE — 99285 EMERGENCY DEPT VISIT HI MDM: CPT

## 2023-09-22 PROCEDURE — 82077 ASSAY SPEC XCP UR&BREATH IA: CPT | Performed by: EMERGENCY MEDICINE

## 2023-09-23 LAB
AMPHETAMINES SERPL QL SCN: NEGATIVE
BACTERIA UR QL AUTO: ABNORMAL /HPF
BARBITURATES UR QL: NEGATIVE
BENZODIAZ UR QL: NEGATIVE
BILIRUB UR QL STRIP: NEGATIVE
CAOX CRY URNS QL MICRO: ABNORMAL /HPF
CLARITY UR: ABNORMAL
COCAINE UR QL: POSITIVE
COLOR UR: YELLOW
GLUCOSE UR STRIP-MCNC: NEGATIVE MG/DL
HGB UR QL STRIP.AUTO: ABNORMAL
KETONES UR STRIP-MCNC: ABNORMAL MG/DL
LEUKOCYTE ESTERASE UR QL STRIP: ABNORMAL
METHADONE UR QL: NEGATIVE
NITRITE UR QL STRIP: POSITIVE
NON-SQ EPI CELLS URNS QL MICRO: ABNORMAL /HPF
OPIATES UR QL SCN: NEGATIVE
OXYCODONE+OXYMORPHONE UR QL SCN: NEGATIVE
PCP UR QL: NEGATIVE
PH UR STRIP.AUTO: 6.5 [PH]
PROT UR STRIP-MCNC: NEGATIVE MG/DL
RBC #/AREA URNS AUTO: ABNORMAL /HPF
SP GR UR STRIP.AUTO: 1.02 (ref 1–1.03)
THC UR QL: POSITIVE
UROBILINOGEN UR QL STRIP.AUTO: 0.2 E.U./DL
WBC #/AREA URNS AUTO: ABNORMAL /HPF

## 2023-09-23 PROCEDURE — 81001 URINALYSIS AUTO W/SCOPE: CPT | Performed by: EMERGENCY MEDICINE

## 2023-09-23 PROCEDURE — 80307 DRUG TEST PRSMV CHEM ANLYZR: CPT | Performed by: EMERGENCY MEDICINE

## 2023-09-23 RX ORDER — LURASIDONE HYDROCHLORIDE 20 MG/1
60 TABLET, FILM COATED ORAL
Status: DISCONTINUED | OUTPATIENT
Start: 2023-09-23 | End: 2023-09-25 | Stop reason: HOSPADM

## 2023-09-23 RX ORDER — LORAZEPAM 1 MG/1
1 TABLET ORAL 2 TIMES DAILY
Status: DISCONTINUED | OUTPATIENT
Start: 2023-09-23 | End: 2023-09-25 | Stop reason: HOSPADM

## 2023-09-23 RX ORDER — BUSPIRONE HYDROCHLORIDE 5 MG/1
15 TABLET ORAL 2 TIMES DAILY
Status: DISCONTINUED | OUTPATIENT
Start: 2023-09-23 | End: 2023-09-25 | Stop reason: HOSPADM

## 2023-09-23 RX ORDER — DESVENLAFAXINE SUCCINATE 50 MG/1
100 TABLET, EXTENDED RELEASE ORAL DAILY
Status: DISCONTINUED | OUTPATIENT
Start: 2023-09-23 | End: 2023-09-25 | Stop reason: HOSPADM

## 2023-09-23 RX ORDER — LORAZEPAM 0.5 MG/1
0.5 TABLET ORAL EVERY 6 HOURS PRN
Status: DISCONTINUED | OUTPATIENT
Start: 2023-09-23 | End: 2023-09-23

## 2023-09-23 RX ADMIN — LORAZEPAM 0.5 MG: 0.5 TABLET ORAL at 08:22

## 2023-09-23 RX ADMIN — BUSPIRONE HYDROCHLORIDE 15 MG: 5 TABLET ORAL at 08:22

## 2023-09-23 RX ADMIN — BUSPIRONE HYDROCHLORIDE 15 MG: 5 TABLET ORAL at 18:36

## 2023-09-23 RX ADMIN — DESVENLAFAXINE 100 MG: 50 TABLET, FILM COATED, EXTENDED RELEASE ORAL at 17:40

## 2023-09-23 RX ADMIN — LORAZEPAM 1 MG: 1 TABLET ORAL at 17:40

## 2023-09-23 RX ADMIN — LURASIDONE HYDROCHLORIDE 60 MG: 20 TABLET, FILM COATED ORAL at 17:40

## 2023-09-23 NOTE — ED NOTES
ALICIA received a call from Amagon the patient's daughter looking for an update. ALICIA provided an update. Elvi  stated she spoke with her mother and she did not receive her medications today. ALICIA took the list of medications and discussed them with the doctor.  The doctor prescribed three of the four meds on the list. The gabapentin was not added as they need to verify the high dosage the patient is stating she takes ( gabapentin 1200mg 3 times a day)     Brandon URBINA

## 2023-09-23 NOTE — ED PROVIDER NOTES
History  Chief Complaint   Patient presents with   • Psychiatric Evaluation     Pt brought in by [facility] police after patient tried jumping out of car in attempts to kill herself. Pt sts "I'd rather take my own life on my terms than someone else" pt concerned when ex bf gets out of penitentiary he is going to come and kill her. Pt is a 38yo F who presents for SI. Patient reports thoughts of self-harm and today attempted to jump out of a car in an attempt to kill herself. Patient states she is feeling this because her ex is getting out of penitentiary soon and he said that once he is out he is going to come and kill her. Patient states that she would rather "have control" and end her life on her terms. Patient states she has felt like this before but cannot state how long she has been feeling like this this particular time. Pt does not have thoughts of hurting others or hallucinations. Prior to Admission Medications   Prescriptions Last Dose Informant Patient Reported? Taking?    FLUoxetine (PROzac) 40 MG capsule  Self Yes No   Sig: Take 80 mg by mouth daily    Patient not taking: Reported on 7/16/2021   LORazepam (ATIVAN) 0.5 mg tablet  Self Yes No   Sig: Take 1 mg by mouth every 8 (eight) hours as needed for anxiety    Melatonin-Pyridoxine (MELATIN PO)   Yes No   Sig: Take 3 mg by mouth daily at bedtime   OLANZapine (ZyPREXA) 2.5 mg tablet   Yes No   Sig: Take 2.5 mg by mouth daily at bedtime   Patient not taking: Reported on 7/16/2021   acetaminophen (TYLENOL) 325 mg tablet   No No   Sig: Take 3 tablets (975 mg total) by mouth every 8 (eight) hours   Patient not taking: Reported on 7/16/2021   acetaminophen-codeine (TYLENOL #3) 300-30 mg per tablet   Yes No   Sig: Take 1 tablet by mouth every 4 (four) hours as needed for moderate pain   Patient not taking: Reported on 1/16/2023   b complex vitamins capsule   Yes No   Sig: Take 1 capsule by mouth daily   buprenorphine (SUBUTEX) 2 mg   Yes No   Sig: Place 8 mg under the tongue 2 (two) times a day   Patient not taking: Reported on 7/16/2021   busPIRone (BUSPAR) 15 mg tablet   Yes No   Sig: Take 20 mg by mouth 3 (three) times a day    furosemide (LASIX) 20 mg tablet   Yes No   Sig: Take 20 mg by mouth if needed   gabapentin (NEURONTIN) 800 mg tablet  Self Yes No   Sig: Take 1,200 mg by mouth 3 (three) times a day    lidocaine (Lidoderm) 5 %   No No   Sig: Apply 1 patch topically over 12 hours daily Remove & Discard patch within 12 hours or as directed by MD   lithium carbonate 150 mg capsule   Yes No   Sig: Take 450 mg by mouth daily at bedtime   Patient not taking: Reported on 7/16/2021   meclizine (ANTIVERT) 12.5 MG tablet   No No   Sig: Take 1 tablet (12.5 mg total) by mouth every 8 (eight) hours   Patient not taking: Reported on 1/16/2023   methocarbamol (ROBAXIN) 500 mg tablet   No No   Sig: Take 1 tablet (500 mg total) by mouth 2 (two) times a day   methylPREDNISolone 4 MG tablet therapy pack   No No   Sig: Use as directed on package   Patient not taking: Reported on 1/16/2023   nicotine (NICODERM CQ) 21 mg/24 hr TD 24 hr patch   Yes No   Sig: Place 1 patch on the skin every 24 hours   Patient not taking: Reported on 7/16/2021   omeprazole (PriLOSEC) 40 MG capsule   Yes No   Sig: Take 40 mg by mouth daily   predniSONE 20 mg tablet   No No   Sig: Take 1 tablet (20 mg total) by mouth daily   Patient not taking: Reported on 4/26/2023   propranolol (INDERAL) 40 mg tablet   Yes No   Sig: Take 40 mg by mouth 2 (two) times a day   Patient not taking: Reported on 1/16/2023      Facility-Administered Medications: None       Past Medical History:   Diagnosis Date   • Anemia    • Anxiety    • Chronic left-sided low back pain with left-sided sciatica 2/12/2020   • Chronic pain    • Chronic pain syndrome 2/12/2020   • Depression    • Diabetes ([address])    • Diabetes mellitus ([address])     no meds   • Psychiatric disorder    • Vegetarian        Past Surgical History:   Procedure Laterality Date   • ANTERIOR / POSTERIOR COMBINED FUSION THORACIC SPINE     • BACK SURGERY     •  SECTION     • GASTRIC BYPASS     • JORGE-EN-Y PROCEDURE         • SALPINGECTOMY     • TUBAL LIGATION         Family History   Problem Relation Age of Onset   • No Known Problems Mother    • No Known Problems Father    • Kidney disease Maternal Grandfather      I have reviewed and agree with the history as documented. E-Cigarette/Vaping   • E-Cigarette Use Current Every Day User      E-Cigarette/Vaping Substances   • Nicotine No    • THC No    • CBD No    • Flavoring No    • Other No    • Unknown No      Social History     Tobacco Use   • Smoking status: Former     Packs/day: 0.50     Types: Cigarettes   • Smokeless tobacco: Never   Vaping Use   • Vaping Use: Every day   Substance Use Topics   • Alcohol use: Yes     Comment: social   • Drug use: Yes     Types: Marijuana     Comment: gigi Retana. for pain       Review of Systems   Psychiatric/Behavioral: Positive for dysphoric mood, self-injury and suicidal ideas. The patient is nervous/anxious. All other systems reviewed and are negative. Physical Exam  Physical Exam  Vitals reviewed. Constitutional:       Appearance: She is well-developed. She is not toxic-appearing or diaphoretic. HENT:      Head: Normocephalic and atraumatic. Right Ear: External ear normal.      Left Ear: External ear normal.      Nose: Nose normal.      Mouth/Throat:      Pharynx: Oropharynx is clear. Eyes:      Extraocular Movements: Extraocular movements intact. Cardiovascular:      Rate and Rhythm: Normal rate and regular rhythm. Heart sounds: Normal heart sounds. Pulmonary:      Effort: Pulmonary effort is normal. No respiratory distress. Breath sounds: Normal breath sounds. Abdominal:      General: There is no distension. Palpations: Abdomen is soft. Tenderness: There is no abdominal tenderness.    Musculoskeletal:         General: Normal range of motion. Cervical back: Normal range of motion. Skin:     General: Skin is warm and dry. Capillary Refill: Capillary refill takes less than 2 seconds. Neurological:      Mental Status: She is alert and oriented to person, place, and time. Psychiatric:         Attention and Perception: She does not perceive auditory or visual hallucinations. Mood and Affect: Mood is anxious. Affect is tearful. Speech: Speech normal.         Behavior: Behavior is cooperative. Thought Content: Thought content includes suicidal ideation. Thought content does not include homicidal ideation. Thought content does not include homicidal or suicidal plan.          Vital Signs  ED Triage Vitals [09/22/23 2250]   Temperature Pulse Respirations Blood Pressure SpO2   98.5 °F (36.9 °C) 78 20 131/71 98 %      Temp Source Heart Rate Source Patient Position - Orthostatic VS BP Location FiO2 (%)   Tympanic Monitor Sitting Right arm --      Pain Score       5           Vitals:    09/22/23 2250 09/23/23 0703   BP: 131/71 104/54   Pulse: 78 67   Patient Position - Orthostatic VS: Sitting Lying         Visual Acuity      ED Medications  Medications   busPIRone (BUSPAR) tablet 15 mg (15 mg Oral Given 9/23/23 0822)   LORazepam (ATIVAN) tablet 0.5 mg (0.5 mg Oral Given 9/23/23 0822)       Diagnostic Studies  Results Reviewed     Procedure Component Value Units Date/Time    Urine Microscopic [[id number]]  (Abnormal) Collected: 09/23/23 1338    Lab Status: Final result Specimen: Urine, Other Updated: 09/23/23 1427     RBC, UA 0-1 /hpf      WBC, UA 4-10 /hpf      Epithelial Cells Moderate /hpf      Bacteria, UA Innumerable /hpf      Ca Oxalate Clarice, UA Moderate /hpf     UA w Reflex to Microscopic w Reflex to Culture [[id number]]  (Abnormal) Collected: 09/23/23 1338    Lab Status: Final result Specimen: Urine, Other Updated: 09/23/23 1420     Color, UA Yellow     Clarity, UA Cloudy     Specific Gravity, UA 1.025     pH, UA 6.5 Leukocytes, UA Trace     Nitrite, UA Positive     Protein, UA Negative mg/dl      Glucose, UA Negative mg/dl      Ketones, UA Trace mg/dl      Urobilinogen, UA 0.2 E.U./dl      Bilirubin, UA Negative     Occult Blood, UA Trace-Intact    Rapid drug screen, urine [[id number]]  (Abnormal) Collected: 09/23/23 1338    Lab Status: Final result Specimen: Urine, Other Updated: 09/23/23 1418     Amph/Meth UR Negative     Barbiturate Ur Negative     Benzodiazepine Urine Negative     Cocaine Urine Positive     Methadone Urine Negative     Opiate Urine Negative     PCP Ur Negative     THC Urine Positive     Oxycodone Urine Negative    Narrative:      Presumptive report. If requested, specimen will be sent to reference lab for confirmation. FOR MEDICAL PURPOSES ONLY. IF CONFIRMATION NEEDED PLEASE CONTACT THE LAB WITHIN 5 DAYS.     Drug Screen Cutoff Levels:  AMPHETAMINE/METHAMPHETAMINES  1000 ng/mL  BARBITURATES     200 ng/mL  BENZODIAZEPINES     200 ng/mL  COCAINE      300 ng/mL  METHADONE      300 ng/mL  OPIATES      300 ng/mL  PHENCYCLIDINE     25 ng/mL  THC       50 ng/mL  OXYCODONE      100 ng/mL    hCG, qualitative pregnancy [[id number]]  (Normal) Collected: 09/22/23 2318    Lab Status: Final result Specimen: Blood from Arm, Right Updated: 09/22/23 2357     Preg, Serum Negative    Comprehensive metabolic panel [[id number]] Collected: 09/22/23 2318    Lab Status: Final result Specimen: Blood from Arm, Right Updated: 09/22/23 2352     Sodium 142 mmol/L      Potassium 3.6 mmol/L      Chloride 107 mmol/L      CO2 28 mmol/L      ANION GAP 7 mmol/L      BUN 9 mg/dL      Creatinine 0.70 mg/dL      Glucose 82 mg/dL      Calcium 8.9 mg/dL      AST 20 U/L      ALT 14 U/L      Alkaline Phosphatase 60 U/L      Total Protein 6.6 g/dL      Albumin 3.9 g/dL      Total Bilirubin 0.34 mg/dL      eGFR 105 ml/min/1.73sq m     Narrative:      [place] guidelines for Chronic Kidney Disease (CKD):   •  Stage 1 with normal or high GFR (GFR > 90 mL/min/1.73 square meters)  •  Stage 2 Mild CKD (GFR = 60-89 mL/min/1.73 square meters)  •  Stage 3A Moderate CKD (GFR = 45-59 mL/min/1.73 square meters)  •  Stage 3B Moderate CKD (GFR = 30-44 mL/min/1.73 square meters)  •  Stage 4 Severe CKD (GFR = 15-29 mL/min/1.73 square meters)  •  Stage 5 End Stage CKD (GFR <15 mL/min/1.73 square meters)  Note: GFR calculation is accurate only with a steady state creatinine    Ethanol [[id number]]  (Normal) Collected: 09/22/23 2318    Lab Status: Final result Specimen: Blood from Arm, Right Updated: 09/22/23 2351     Ethanol Lvl <10 mg/dL     CBC and differential [[id number]]  (Abnormal) Collected: 09/22/23 2318    Lab Status: Final result Specimen: Blood from Arm, Right Updated: 09/22/23 2326     WBC 4.20 Thousand/uL      RBC 3.86 Million/uL      Hemoglobin 10.0 g/dL      Hematocrit 31.3 %      MCV 81 fL      MCH 25.9 pg      MCHC 31.9 g/dL      RDW 19.2 %      MPV 8.4 fL      Platelets 310 Thousands/uL      nRBC 0 /100 WBCs      Neutrophils Relative 44 %      Immat GRANS % 0 %      Lymphocytes Relative 41 %      Monocytes Relative 9 %      Eosinophils Relative 5 %      Basophils Relative 1 %      Neutrophils Absolute 1.88 Thousands/µL      Immature Grans Absolute 0.01 Thousand/uL      Lymphocytes Absolute 1.73 Thousands/µL      Monocytes Absolute 0.36 Thousand/µL      Eosinophils Absolute 0.19 Thousand/µL      Basophils Absolute 0.03 Thousands/µL                  No orders to display              Procedures  Procedures         ED Course  ED Course as of 09/23/23 1708   Fri Sep 22, 2023   2327 CBC and differential(!)  Reviewed and without actionable derangement. 2352 MEDICAL ALCOHOL: <10  Negative. 2352 Comprehensive metabolic panel  Reviewed and without actionable derangement. 2359 PREGNANCY, SERUM: Negative  Negative.                                               Medical Decision Making  Pt is a 38yo F who presents with SI with attempt at self-harm. Will plan for medical clearance and crisis eval.   See ED course for results and details. Still awaiting evaluation at time of sign out. Pt signed out to oncoming physician. Amount and/or Complexity of Data Reviewed  Labs: ordered. Decision-making details documented in ED Course. Risk  Prescription drug management. Decision regarding hospitalization. Disposition  Final diagnoses:   Suicidal ideations   Potential for intentional self-harm     Time reflects when diagnosis was documented in both MDM as applicable and the Disposition within this note     Time User Action Codes Description Comment    9/22/2023 11:12 PM Tony Yanez [J26.713] Suicidal ideations     9/22/2023 11:13 PM Tony Yanez [Z91.89] Potential for intentional self-harm       ED Disposition     ED Disposition   Transfer to [address] [place] [address]   --    Date/Time   Fri Sep 22, 2023 11:13 PM    [address] should be transferred out to [employer] if deemed appropriate by family guidance and has been medically cleared. Follow-up Information    None         Patient's Medications   Discharge Prescriptions    No medications on file       No discharge procedures on file.     PDMP Review       Value Time User    PDMP Reviewed  Yes 5/1/2021 10:12 AM TRESA Reyes          ED Provider  Electronically Signed by           Boby Ramos MD  09/23/23 8267

## 2023-09-23 NOTE — ED NOTES
Family Guidance present at this time to do psychiatric evaluation with psychiatrist.     Freddy Cardoso RN  09/23/23 3993

## 2023-09-23 NOTE — ED NOTES
40 y/o female presented to the ED by [facility]. Patient attempted to jump out of a car with the intention to kill herself. Patient stated " I'd rather take my own life on my terms than someone else" Patient is concerned when her ex boyfriend gets out of [facility] he is going to kill her. PES went into to speak with the patient. Patient was delayed in her responses. Patient stated she tried to get out of the car. When PES asked if the car was moving the patient was not sure. When asked why the patient tried to get out of the car the patient responded with " I didnt want to be in the car anymore" Patient admits to stating she wanting to take her own life. Patient is not sure if she has SI. Patient states sometimes she has HI. Patient denies AVH. Patient reports poor sleep " I sleep 4 hrs thats it" Patient reports her appetite is poor she eats once a day. Patient  has a history of inpatient treatment last time was 2.5 yrs ago at [facility]. Patient does have outpatient services with [facility]. She sees someone once a week. Patient is not willing to go for inpatient treatment. PES and EDMD agree she needs inpatient. CFS will be over to complete an assessment.  Patient gave permission for PES to speak to her daughter Betsy Graham [phone]       Jacinto URBINA

## 2023-09-23 NOTE — ED CARE HANDOFF
Emergency Department Sign Out Note        Sign out and transfer of care from Dr. Tamar Oliver. See Separate Emergency Department note. The patient, Beatris Tyson, was evaluated by the previous provider for suicidal ideation, threatening to throw self out of her car. Workup Completed:  Patient is medically cleared for psychiatric evaluation. ED Course / Workup Pending (followup): Signed out pending screening which was performed. ED Course as of 09/23/23 1459   Sat Sep 23, 2023   0703 SI plan to throw self out of car, medically cleared, needs screening     Procedures  MDM        Disposition  Final diagnoses:   Suicidal ideations   Potential for intentional self-harm     Time reflects when diagnosis was documented in both MDM as applicable and the Disposition within this note     Time User Action Codes Description Comment    9/22/2023 11:12 PM Keven Metzger, [address] Suicidal ideations     9/22/2023 11:13 PM Lilliana Sainz Add [Z91.89] Potential for intentional self-harm       ED Disposition     ED Disposition   Transfer to [address] [place] [address]   --    Date/Time   Fri Sep 22, 2023 11:13 PM    [address] should be transferred out to [facility] if deemed appropriate by family guidance and has been medically cleared. Follow-up Information    None       Patient's Medications   Discharge Prescriptions    No medications on file     No discharge procedures on file.        ED Provider  Electronically Signed by     Kaykay Munoz MD  09/23/23 7371

## 2023-09-23 NOTE — ED NOTES
Pt changed into paper scrubs. All belongings collected and placed in locker 20. Pt calm and cooperative with process. C/o chronic back pain no worse than baseline. Pt tearful and sitting in corner of room on floor.      Leobardo Lockhart RN  09/22/23 0162

## 2023-09-24 RX ORDER — ACETAMINOPHEN 325 MG/1
975 TABLET ORAL EVERY 6 HOURS PRN
Status: DISCONTINUED | OUTPATIENT
Start: 2023-09-24 | End: 2023-09-25 | Stop reason: HOSPADM

## 2023-09-24 RX ORDER — GABAPENTIN 400 MG/1
1200 CAPSULE ORAL 3 TIMES DAILY
Status: DISCONTINUED | OUTPATIENT
Start: 2023-09-24 | End: 2023-09-25 | Stop reason: HOSPADM

## 2023-09-24 RX ADMIN — LURASIDONE HYDROCHLORIDE 60 MG: 20 TABLET, FILM COATED ORAL at 07:53

## 2023-09-24 RX ADMIN — BUSPIRONE HYDROCHLORIDE 15 MG: 5 TABLET ORAL at 09:40

## 2023-09-24 RX ADMIN — GABAPENTIN 1200 MG: 400 CAPSULE ORAL at 12:34

## 2023-09-24 RX ADMIN — LORAZEPAM 1 MG: 1 TABLET ORAL at 19:00

## 2023-09-24 RX ADMIN — BUSPIRONE HYDROCHLORIDE 15 MG: 5 TABLET ORAL at 19:00

## 2023-09-24 RX ADMIN — DESVENLAFAXINE 100 MG: 50 TABLET, FILM COATED, EXTENDED RELEASE ORAL at 09:40

## 2023-09-24 RX ADMIN — GABAPENTIN 1200 MG: 400 CAPSULE ORAL at 20:57

## 2023-09-24 RX ADMIN — LORAZEPAM 1 MG: 1 TABLET ORAL at 09:40

## 2023-09-24 RX ADMIN — ACETAMINOPHEN 975 MG: 325 TABLET ORAL at 20:57

## 2023-09-24 NOTE — ED NOTES
Marizol from [address] [place] [address] came over to complete the tele psych for the patient. Patient was committed. Patient was told she was committed and patient was not happy about that.      Ruben BAPTISTE

## 2023-09-24 NOTE — ED NOTES
Spoke with [facility] in [place] who verifies Neurontin 600 mg 2 tablets three times daily. Dr. Badillo Friday made aware.      Sam Horne RN  09/24/23 1037

## 2023-09-24 NOTE — ED NOTES
15:40 - CFS/Gerda called - waited listed @ [facility]; Hortencia Garcia is reviewing. 16:15 - friend called - just wanted to know what clothes would be prohibited - will drop off stuff to [facility]. 19:15 - called CFS/Jac - all the private hospitals are full and expected to have d/c's tomorrow.

## 2023-09-24 NOTE — ED NOTES
Pt cooperative with medication administration. Lunch tray at bedside and pt currently eating. Pt shows no sign of distress with even, unlabored breathing.      Mary Walker RN  09/24/23 2143

## 2023-09-24 NOTE — ED NOTES
9/24/23 @ 1026:  PES updated by Hernan Brooks at [facility]; bed search will commence. MS Kirt    1400: Patient met with peer services at [facility] and was provided update.   Lupe Hinton MS

## 2023-09-24 NOTE — ED NOTES
Patient laying quietly in bed. Cooperative with taking of vital signs and medications, but appears agitated that security was present. Offered shower, patient declined. Breakfast tray left on bed.      Montana Buck RN  09/24/23 6133

## 2023-09-25 VITALS
OXYGEN SATURATION: 98 % | HEIGHT: 61 IN | RESPIRATION RATE: 18 BRPM | BODY MASS INDEX: 22.66 KG/M2 | SYSTOLIC BLOOD PRESSURE: 104 MMHG | TEMPERATURE: 98.2 F | HEART RATE: 67 BPM | WEIGHT: 120 LBS | DIASTOLIC BLOOD PRESSURE: 57 MMHG

## 2023-09-25 RX ADMIN — LORAZEPAM 1 MG: 1 TABLET ORAL at 09:16

## 2023-09-25 RX ADMIN — GABAPENTIN 1200 MG: 400 CAPSULE ORAL at 09:16

## 2023-09-25 RX ADMIN — BUSPIRONE HYDROCHLORIDE 15 MG: 5 TABLET ORAL at 09:16

## 2023-09-25 RX ADMIN — LURASIDONE HYDROCHLORIDE 60 MG: 20 TABLET, FILM COATED ORAL at 09:16

## 2023-09-25 RX ADMIN — DESVENLAFAXINE 100 MG: 50 TABLET, FILM COATED, EXTENDED RELEASE ORAL at 09:16

## 2023-09-25 NOTE — EMTALA/ACUTE CARE TRANSFER
[address] [address] [address]  [address] [address]  Dept: [phone]      EMTALA TRANSFER CONSENT    NAME Danyel Avendano                                         1980                              MRN [id number]    I have been informed of my rights regarding examination, treatment, and transfer   by Dr. Mei Or, DO    Benefits: Specialized equipment and/or services available at the receiving facility (Include comment)________________________    Risks: Potential for delay in receiving treatment, Potential deterioration of medical condition, Increased discomfort during transfer, Possible worsening of condition or death during transfer      Consent for Transfer:  I acknowledge that my medical condition has been evaluated and explained to me by the emergency department physician or other qualified medical person and/or my attending physician, who has recommended that I be transferred to the service of  Accepting Physician: Dr. Med Grace at [address] Name, [facility] : [facility]. The above potential benefits of such transfer, the potential risks associated with such transfer, and the probable risks of not being transferred have been explained to me, and I fully understand them. The doctor has explained that, in my case, the benefits of transfer outweigh the risks. I agree to be transferred. I authorize the performance of emergency medical procedures and treatments upon me in both transit and upon arrival at the receiving facility. Additionally, I authorize the release of any and all medical records to the receiving facility and request they be transported with me, if possible. I understand that the safest mode of transportation during a medical emergency is an ambulance and that the Hospital advocates the use of this mode of transport.  Risks of traveling to the receiving facility by car, including absence of medical control, life sustaining equipment, such as oxygen, and medical personnel has been explained to me and I fully understand them. (LEONIE CORRECT BOX BELOW)  [  ]  I consent to the stated transfer and to be transported by ambulance/helicopter. [  ]  I consent to the stated transfer, but refuse transportation by ambulance and accept full responsibility for my transportation by car. I understand the risks of non-ambulance transfers and I exonerate the Hospital and its staff from any deterioration in my condition that results from this refusal.    X___________________________________________    DATE  23  TIME________  Signature of patient or legally responsible individual signing on patient behalf           RELATIONSHIP TO PATIENT_________________________          Provider [address] [place] 1980                              MRN [id number]    A medical screening exam was performed on the above named patient. Based on the examination:    Condition Necessitating Transfer The primary encounter diagnosis was Suicidal ideations. A diagnosis of Potential for intentional self-harm was also pertinent to this visit.     Patient Condition: The patient has been stabilized such that within reasonable medical probability, no material deterioration of the patient condition or the condition of the unborn child(freddy) is likely to result from the transfer    Reason for Transfer: Level of Care needed not available at this facility    Transfer Requirements: [address]   · Space available and qualified personnel available for treatment as acknowledged by    · Agreed to accept transfer and to provide appropriate medical treatment as acknowledged by       Dr. Carolina Mooeny  · Appropriate medical records of the examination and treatment of the patient are provided at the time of transfer   [address] _______  · Transfer will be performed by qualified personnel from    and appropriate transfer equipment as required, including the use of necessary and appropriate life support measures. Provider Certification: I have examined the patient and explained the following risks and benefits of being transferred/refusing transfer to the patient/family:  General risk, such as traffic hazards, adverse weather conditions, rough terrain or turbulence, possible failure of equipment (including vehicle or aircraft), or consequences of actions of persons outside the control of the transport personnel      Based on these reasonable risks and benefits to the patient and/or the unborn child(freddy), and based upon the information available at the time of the patient’s examination, I certify that the medical benefits reasonably to be expected from the provision of appropriate medical treatments at another medical facility outweigh the increasing risks, if any, to the individual’s medical condition, and in the case of labor to the unborn child, from effecting the transfer.     X____________________________________________ DATE 09/25/23        TIME_______      ORIGINAL - SEND TO MEDICAL RECORDS   COPY - SEND WITH PATIENT DURING TRANSFER

## 2023-09-25 NOTE — ED NOTES
9/25/23 @ 0927:  PES provided update to Emperatriz Young from [employer]. [address], [address]: Patient is accepted at [facility]  Patient is accepted by Dr. Debi Arroyo per [facility]  Nurse report is to be called to [phone] prior to patient transfer. 2058: Transportation is arranged with [employer]. [facility] requested [id number] pickup, as [place] requested 1600 arrival.  [address], [address] [place]: Transportation is scheduled for [phone] via [address]   Patient may go to the floor at 1600  PES notified ED staff, patient, her daughter, and CFS. [address], [place]    1020: Juliane Gunter from [employer] arrived and met with patient. Guido Cockayne provided business card and PES placed with patient's belongings. [address], [place]: PES informed patient that Guido Cockayne from [employer] called and left message that her ex-boyfriend was "still in [facility]; I confirmed with the [facility] [place]; can you let the patient know?"    PES informed patient of above. [place], [place]    1250: Patient's daughter arrived and left clothes for patient. PES provided update and placed clothing with sticker in patient's locker #20. [place]    1400: [employer] arrived and patient was transported to [place] with her belongings. Patient was calm and cooperative.   [address], [place]

## 2023-09-25 NOTE — ED NOTES
Pt calm and cooperative. Pt given pretzels and crackers. Pt now watching tv with no sign of distress.       Wendy Perez  09/24/23 2106

## 2024-02-21 PROBLEM — N12 PYELONEPHRITIS: Status: RESOLVED | Noted: 2019-02-04 | Resolved: 2024-02-21

## 2024-09-06 ENCOUNTER — HOSPITAL ENCOUNTER (EMERGENCY)
Facility: HOSPITAL | Age: 44
Discharge: HOME | End: 2024-09-06
Attending: EMERGENCY MEDICINE
Payer: COMMERCIAL

## 2024-09-06 ENCOUNTER — APPOINTMENT (EMERGENCY)
Dept: RADIOLOGY | Facility: HOSPITAL | Age: 44
End: 2024-09-06
Payer: COMMERCIAL

## 2024-09-06 VITALS
SYSTOLIC BLOOD PRESSURE: 116 MMHG | DIASTOLIC BLOOD PRESSURE: 72 MMHG | OXYGEN SATURATION: 97 % | WEIGHT: 147.4 LBS | RESPIRATION RATE: 20 BRPM | HEART RATE: 74 BPM | BODY MASS INDEX: 27.85 KG/M2 | TEMPERATURE: 98.2 F

## 2024-09-06 DIAGNOSIS — R51.9 HEADACHE: ICD-10-CM

## 2024-09-06 DIAGNOSIS — K08.89 PAIN, DENTAL: Primary | ICD-10-CM

## 2024-09-06 PROCEDURE — 70450 CT HEAD/BRAIN W/O DYE: CPT

## 2024-09-06 PROCEDURE — 99284 EMERGENCY DEPT VISIT MOD MDM: CPT | Performed by: EMERGENCY MEDICINE

## 2024-09-06 PROCEDURE — 99284 EMERGENCY DEPT VISIT MOD MDM: CPT

## 2024-09-06 RX ORDER — CYCLOBENZAPRINE HCL 5 MG
5 TABLET ORAL 3 TIMES DAILY PRN
COMMUNITY

## 2024-09-06 RX ORDER — OXYCODONE HYDROCHLORIDE 5 MG/1
5 TABLET ORAL ONCE
Status: COMPLETED | OUTPATIENT
Start: 2024-09-06 | End: 2024-09-06

## 2024-09-06 RX ADMIN — AMOXICILLIN AND CLAVULANATE POTASSIUM 1 TABLET: 875; 125 TABLET, FILM COATED ORAL at 23:15

## 2024-09-06 RX ADMIN — OXYCODONE HYDROCHLORIDE 5 MG: 5 TABLET ORAL at 22:15

## 2024-09-07 NOTE — ED NOTES
Warm pack given per patient request. States heat makes pain feel better.     Joslyn Chand RN  09/06/24 0608

## 2024-09-07 NOTE — DISCHARGE INSTRUCTIONS
Your CT scan is ok.  You can try the different antibiotic and continue your pain medicine as prescribed.  You can do warm salt water rinse and spit several times a day.  Check in the with your doctor next week.

## 2024-09-07 NOTE — ED PROVIDER NOTES
History  Chief Complaint   Patient presents with    Dental Pain     Had dental procedure done about 2 weeks ago, having problems with pain since, went back put on clindamycin. Still no relief     43 yo female has had dental work on left lower tooth in the last 2 weeks.  She had root canal and then work to build up in preparation for a crown.  She continues with severe pain in left side of face and radiating to ear and left side of her head.  She is day 8 of a 10 day course of clindamycin.  She is very concerned with the pain going up in her head because she has h/o bleeding in her brain.  She wants a CT scan.  No fever, cough, vomiting.  + associated swollen gland on left side.       History provided by:  Patient   used: No    Dental Pain  Associated symptoms: headaches    Associated symptoms: no fever        Prior to Admission Medications   Prescriptions Last Dose Informant Patient Reported? Taking?   FLUoxetine (PROzac) 40 MG capsule  Self Yes No   Sig: Take 80 mg by mouth daily    Patient not taking: Reported on 7/16/2021   LORazepam (ATIVAN) 0.5 mg tablet  Self Yes No   Sig: Take 1 mg by mouth every 8 (eight) hours as needed for anxiety    Melatonin-Pyridoxine (MELATIN PO) Not Taking  Yes No   Sig: Take 3 mg by mouth daily at bedtime   Patient not taking: Reported on 9/6/2024   OLANZapine (ZyPREXA) 2.5 mg tablet   Yes No   Sig: Take 2.5 mg by mouth daily at bedtime   Patient not taking: Reported on 7/16/2021   acetaminophen (TYLENOL) 325 mg tablet   No No   Sig: Take 3 tablets (975 mg total) by mouth every 8 (eight) hours   Patient not taking: Reported on 7/16/2021   acetaminophen-codeine (TYLENOL #3) 300-30 mg per tablet   Yes No   Sig: Take 1 tablet by mouth every 4 (four) hours as needed for moderate pain   Patient not taking: Reported on 1/16/2023   b complex vitamins capsule   Yes No   Sig: Take 1 capsule by mouth daily   buprenorphine (SUBUTEX) 2 mg   Yes No   Sig: Place 8 mg under the  tongue 2 (two) times a day   Patient not taking: Reported on 7/16/2021   busPIRone (BUSPAR) 15 mg tablet   Yes No   Sig: Take 20 mg by mouth 3 (three) times a day    cyclobenzaprine (FLEXERIL) 5 mg tablet   Yes Yes   Sig: Take 5 mg by mouth 3 (three) times a day as needed for muscle spasms   furosemide (LASIX) 20 mg tablet Not Taking  Yes No   Sig: Take 20 mg by mouth if needed   Patient not taking: Reported on 9/6/2024   gabapentin (NEURONTIN) 800 mg tablet  Self Yes No   Sig: Take 1,200 mg by mouth 3 (three) times a day    lidocaine (Lidoderm) 5 %   No No   Sig: Apply 1 patch topically over 12 hours daily Remove & Discard patch within 12 hours or as directed by MD   lithium carbonate 150 mg capsule   Yes No   Sig: Take 450 mg by mouth daily at bedtime   Patient not taking: Reported on 7/16/2021   meclizine (ANTIVERT) 12.5 MG tablet   No No   Sig: Take 1 tablet (12.5 mg total) by mouth every 8 (eight) hours   Patient not taking: Reported on 1/16/2023   methocarbamol (ROBAXIN) 500 mg tablet Not Taking  No No   Sig: Take 1 tablet (500 mg total) by mouth 2 (two) times a day   Patient not taking: Reported on 9/6/2024   methylPREDNISolone 4 MG tablet therapy pack   No No   Sig: Use as directed on package   Patient not taking: Reported on 1/16/2023   nicotine (NICODERM CQ) 21 mg/24 hr TD 24 hr patch   Yes No   Sig: Place 1 patch on the skin every 24 hours   Patient not taking: Reported on 7/16/2021   omeprazole (PriLOSEC) 40 MG capsule   Yes No   Sig: Take 40 mg by mouth daily   predniSONE 20 mg tablet   No No   Sig: Take 1 tablet (20 mg total) by mouth daily   Patient not taking: Reported on 4/26/2023   propranolol (INDERAL) 40 mg tablet   Yes No   Sig: Take 40 mg by mouth 2 (two) times a day   Patient not taking: Reported on 1/16/2023      Facility-Administered Medications: None       Past Medical History:   Diagnosis Date    Anemia     Anxiety     Chronic left-sided low back pain with left-sided sciatica 2/12/2020     Chronic pain     Chronic pain syndrome 2020    Depression     Diabetes (HCC)     Diabetes mellitus (HCC)     no meds    Psychiatric disorder     Vegetarian        Past Surgical History:   Procedure Laterality Date    ANTERIOR / POSTERIOR COMBINED FUSION THORACIC SPINE      BACK SURGERY       SECTION      GASTRIC BYPASS      JORGE-EN-Y PROCEDURE      2012    SALPINGECTOMY      TUBAL LIGATION         Family History   Problem Relation Age of Onset    No Known Problems Mother     No Known Problems Father     Kidney disease Maternal Grandfather      I have reviewed and agree with the history as documented.    E-Cigarette/Vaping    E-Cigarette Use Current Every Day User      E-Cigarette/Vaping Substances    Nicotine No     THC No     CBD No     Flavoring No     Other No     Unknown No      Social History     Tobacco Use    Smoking status: Former     Current packs/day: 0.50     Types: Cigarettes    Smokeless tobacco: Never   Vaping Use    Vaping status: Every Day   Substance Use Topics    Alcohol use: Yes     Comment: social    Drug use: Yes     Types: Marijuana     Comment: med. rasta. for pain       Review of Systems   Constitutional:  Negative for fever.   HENT:  Positive for dental problem.    Respiratory:  Negative for cough.    Gastrointestinal:  Negative for nausea and vomiting.   Skin:  Negative for rash.   Neurological:  Positive for headaches.       Physical Exam  Physical Exam  Vitals and nursing note reviewed.   Constitutional:       General: She is not in acute distress.     Appearance: She is not ill-appearing.   HENT:      Head: Normocephalic and atraumatic.      Mouth/Throat:      Mouth: Mucous membranes are moist.      Pharynx: Oropharynx is clear.      Comments: No obvious gum swelling or inflammation, no facial swelling, no decay/caries visible  Cardiovascular:      Rate and Rhythm: Normal rate and regular rhythm.      Heart sounds: Normal heart sounds.   Pulmonary:      Effort: Pulmonary  effort is normal. No respiratory distress.      Breath sounds: Normal breath sounds.   Abdominal:      General: Abdomen is flat. There is no distension.   Musculoskeletal:         General: No deformity. Normal range of motion.      Cervical back: Normal range of motion and neck supple. Tenderness present.   Lymphadenopathy:      Cervical: Cervical adenopathy present.   Skin:     General: Skin is warm and dry.   Neurological:      General: No focal deficit present.      Mental Status: She is alert and oriented to person, place, and time.         Vital Signs  ED Triage Vitals [09/06/24 2113]   Temperature Pulse Respirations Blood Pressure SpO2   98.2 °F (36.8 °C) 74 20 116/72 97 %      Temp Source Heart Rate Source Patient Position - Orthostatic VS BP Location FiO2 (%)   Tympanic Monitor Sitting Right arm --      Pain Score       10 - Worst Possible Pain           Vitals:    09/06/24 2113   BP: 116/72   Pulse: 74   Patient Position - Orthostatic VS: Sitting         Visual Acuity      ED Medications  Medications   oxyCODONE (ROXICODONE) IR tablet 5 mg (5 mg Oral Given 9/6/24 2215)   amoxicillin-clavulanate (AUGMENTIN) 875-125 mg per tablet 1 tablet (1 tablet Oral Given 9/6/24 2315)       Diagnostic Studies  Results Reviewed       None                   CT head without contrast   Final Result by Lona Hernandez MD (09/06 2257)      No acute intracranial abnormality.                  Workstation performed: [id number]                    Procedures  Procedures         ED Course                                 SBIRT 22yo+      Flowsheet Row Most Recent Value   Initial Alcohol Screen: US AUDIT-C     1. How often do you have a drink containing alcohol? 1 Filed at: 09/06/2024 2125   2. How many drinks containing alcohol do you have on a typical day you are drinking?  0 Filed at: 09/06/2024 2125   3a. Male UNDER 65: How often do you have five or more drinks on one occasion? 0 Filed at: 09/06/2024 2125   3b. FEMALE Any Age, or  MALE 65+: How often do you have 4 or more drinks on one occassion? 0 Filed at: 09/06/2024 2125   Audit-C Score 1 Filed at: 09/06/2024 2125   PILAR: How many times in the past year have you...    Used an illegal drug or used a prescription medication for non-medical reasons? Never Filed at: 09/06/2024 2125                      Medical Decision Making  CT scan normal.  Exam unremarkable.  Prescription monitoring reviewed and pt. Is on chronic narcotics, just got 120 of oxycodone 5mg 9 days ago.  Pt. Says it isn't helping.  She wants a different abx so I will switch her to Augmentin and advised follow up with dentist next week.    Amount and/or Complexity of Data Reviewed  Radiology: ordered.    Risk  Prescription drug management.                 Disposition  Final diagnoses:   Pain, dental   Headache     Time reflects when diagnosis was documented in both MDM as applicable and the Disposition within this note       Time User Action Codes Description Comment    9/6/2024 11:01 PM Holly Cole Add [K08.89] Pain, dental     9/6/2024 11:01 PM Holly Cole Add [R51.9] Headache           ED Disposition       ED Disposition   Discharge    Condition   Stable    Date/Time   Fri Sep 6, 2024 2301    Comment   Sarah Alvarado discharge to home/self care.                   Follow-up Information    None         Discharge Medication List as of 9/6/2024 11:10 PM        START taking these medications    Details   amoxicillin-clavulanate (AUGMENTIN) 875-125 mg per tablet Take 1 tablet by mouth every 12 (twelve) hours for 5 days, Starting Fri 9/6/2024, Until Wed 9/11/2024, Normal           CONTINUE these medications which have NOT CHANGED    Details   cyclobenzaprine (FLEXERIL) 5 mg tablet Take 5 mg by mouth 3 (three) times a day as needed for muscle spasms, Historical Med      acetaminophen (TYLENOL) 325 mg tablet Take 3 tablets (975 mg total) by mouth every 8 (eight) hours, Starting Sat 5/1/2021, Normal      acetaminophen-codeine  (TYLENOL #3) 300-30 mg per tablet Take 1 tablet by mouth every 4 (four) hours as needed for moderate pain, Historical Med      b complex vitamins capsule Take 1 capsule by mouth daily, Historical Med      buprenorphine (SUBUTEX) 2 mg Place 8 mg under the tongue 2 (two) times a day, Historical Med      busPIRone (BUSPAR) 15 mg tablet Take 20 mg by mouth 3 (three) times a day , Historical Med      FLUoxetine (PROzac) 40 MG capsule Take 80 mg by mouth daily , Historical Med      furosemide (LASIX) 20 mg tablet Take 20 mg by mouth if needed, Historical Med      gabapentin (NEURONTIN) 800 mg tablet Take 1,200 mg by mouth 3 (three) times a day , Historical Med      lidocaine (Lidoderm) 5 % Apply 1 patch topically over 12 hours daily Remove & Discard patch within 12 hours or as directed by MD, Starting u 1/19/2023, Until Sat 2/18/2023, Normal      lithium carbonate 150 mg capsule Take 450 mg by mouth daily at bedtime, Historical Med      LORazepam (ATIVAN) 0.5 mg tablet Take 1 mg by mouth every 8 (eight) hours as needed for anxiety , Historical Med      meclizine (ANTIVERT) 12.5 MG tablet Take 1 tablet (12.5 mg total) by mouth every 8 (eight) hours, Starting Sat 5/1/2021, Normal      Melatonin-Pyridoxine (MELATIN PO) Take 3 mg by mouth daily at bedtime, Historical Med      methocarbamol (ROBAXIN) 500 mg tablet Take 1 tablet (500 mg total) by mouth 2 (two) times a day, Starting Mon 1/16/2023, Normal      methylPREDNISolone 4 MG tablet therapy pack Use as directed on package, Normal      nicotine (NICODERM CQ) 21 mg/24 hr TD 24 hr patch Place 1 patch on the skin every 24 hours, Historical Med      OLANZapine (ZyPREXA) 2.5 mg tablet Take 2.5 mg by mouth daily at bedtime, Historical Med      omeprazole (PriLOSEC) 40 MG capsule Take 40 mg by mouth daily, Historical Med      predniSONE 20 mg tablet Take 1 tablet (20 mg total) by mouth daily, Starting Mon 1/16/2023, Normal      propranolol (INDERAL) 40 mg tablet Take 40 mg by  mouth 2 (two) times a day, Historical Med             No discharge procedures on file.    PDMP Review         Value Time User    PDMP Reviewed  Yes 5/1/2021 10:12 AM TRESA Nair            ED Provider  Electronically Signed by             Holly Cole MD  09/06/24 2458

## 2024-09-07 NOTE — ED NOTES
Pt given multiple warm blankets and heating pads per request for facial pain.      Rosalia Tee RN  09/06/24 1440